# Patient Record
Sex: MALE | Race: WHITE | Employment: OTHER | URBAN - METROPOLITAN AREA
[De-identification: names, ages, dates, MRNs, and addresses within clinical notes are randomized per-mention and may not be internally consistent; named-entity substitution may affect disease eponyms.]

---

## 2017-03-15 ENCOUNTER — HOSPITAL ENCOUNTER (OUTPATIENT)
Dept: GENERAL RADIOLOGY | Age: 67
Discharge: HOME | End: 2017-03-15
Attending: INTERNAL MEDICINE
Payer: COMMERCIAL

## 2017-03-15 DIAGNOSIS — J44.9 COPD (CHRONIC OBSTRUCTIVE PULMONARY DISEASE) (HCC): ICD-10-CM

## 2017-03-15 PROCEDURE — 71020 XR CHEST PA LAT: CPT

## 2017-10-17 ENCOUNTER — OFFICE VISIT (OUTPATIENT)
Dept: CARDIOLOGY CLINIC | Age: 67
End: 2017-10-17

## 2017-10-17 VITALS
BODY MASS INDEX: 27.02 KG/M2 | HEART RATE: 72 BPM | WEIGHT: 193 LBS | SYSTOLIC BLOOD PRESSURE: 130 MMHG | DIASTOLIC BLOOD PRESSURE: 72 MMHG | HEIGHT: 71 IN

## 2017-10-17 DIAGNOSIS — Z87.891 AGGRESSIVE EX-SMOKER: ICD-10-CM

## 2017-10-17 DIAGNOSIS — R06.09 DYSPNEA ON EXERTION: Primary | ICD-10-CM

## 2017-10-17 DIAGNOSIS — E78.5 HYPERLIPIDEMIA, UNSPECIFIED HYPERLIPIDEMIA TYPE: ICD-10-CM

## 2017-10-17 DIAGNOSIS — R61 DIAPHORESIS: ICD-10-CM

## 2017-10-17 DIAGNOSIS — J44.9 CHRONIC OBSTRUCTIVE PULMONARY DISEASE, UNSPECIFIED COPD TYPE (HCC): ICD-10-CM

## 2017-10-17 DIAGNOSIS — K50.919 CROHN'S DISEASE WITH COMPLICATION, UNSPECIFIED GASTROINTESTINAL TRACT LOCATION (HCC): ICD-10-CM

## 2017-10-17 NOTE — PROGRESS NOTES
Cardiology Office Consultation Note     Subjective:      Shanique Najera is a 75 y.o. male patient who is seen for evaluation of excessive diaphoresis with minimal exertion and HERNÁNDEZ. Referred by Dr Bryant Matthew. The excessive perspiration started this summer. He says with just standing outside and doing minimal activity he will is \"soaked\". He has chronic HERNÁNDEZ d/t COPD, but this year he has noticed it has progressive and becoming worse. No associated chest pain. He sees Dr Maurizio Jacobs and had non contrast chest CT in March 2017, I do not have result  In 2010 he had chest CT with lung nodules and left adrenal adenoma  He smoked cigarettes 1 ppdx 40 years, he quit 10 years ago. PMH includes COPD, Crohn's disease, hyperlipidemia. He has chronic swelling in his LE extremities, resolves with elevating the feet overnight. No MI or CVA, hypertension, DM. He does not exercise. No syncope. 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 . Grown children   Family hx: No family hx of early CAD. Father unstable angina- no reported CAD. No hx of ppm/ICD. No SCD. Patient Active Problem List   Diagnosis Code    Hyperlipidemia E78.5    Crohn disease ([facility] [address] K50.90    Encounter for long-term (current) drug use Z79.899    COPD (chronic obstructive pulmonary disease) ([facility] J44.9     Current Outpatient Prescriptions   Medication Sig Dispense Refill    simvastatin (ZOCOR) 40 mg tablet TAKE 1 TABLET DAILY 90 Tab 3    DELZICOL 400 mg cpDR DR capsule TAKE 2 CAPSULES THREE TIMES A  Cap 3    SPIRIVA WITH HANDIHALER 18 mcg inhalation capsule INHALE THE CONTENTS OF 1 CAPSULE ONCE DAILY AS DIRECTED IN THE PACKAGE 90 Cap 3    buPROPion SR (WELLBUTRIN SR) 150 mg SR tablet TAKE 1 TABLET TWICE A  Tab 1    FLUoxetine (PROZAC) 20 mg capsule TAKE 1 CAPSULE BY MOUTH DAILY (Patient taking differently: 40 mg daily.  TAKE 1 CAPSULE BY MOUTH DAILY) 90 Cap 3    cyclobenzaprine (FLEXERIL) 10 mg tablet TAKE 1 TABLET 3 TIMES A DAY AS NEEDED MUSCLE SPASM 20 Tab 0     Allergies   Allergen Reactions    Sulfa (Sulfonamide Antibiotics) Unknown (comments)     Past Medical History:   Diagnosis Date    COPD [facility]chronic obstructive pulmonary disease) ([facility] [address]     Crohn's disease ([facility] [address]     Depression     Elevated lipids     Hyperlipidemia 8/30/2011     Past Surgical History:   Procedure Laterality Date    ENDOSCOPY, COLON, DIAGNOSTIC  2006    Rep 5 yrs    ENDOSCOPY, COLON, DIAGNOSTIC  2/2012    OK. Rep 5 yrs.  HX SMALL BOWEL RESECTION  about 1990    For Crohn's     Family History   Problem Relation Age of Onset    Kidney Disease Mother     Cancer Mother     Parkinsonism Father     Dementia Father     Heart Disease Father      Social History   Substance Use Topics    Smoking status: Former Smoker     Quit date: 1/1/2008    Smokeless tobacco: Not on file    Alcohol use Yes      Comment: social alcohol        Review of Systems:   Constitutional: Negative for fever, chills, weight loss, +malaise/fatigue. HEENT: Negative for nosebleeds, vision changes. Respiratory: Negative for cough, hemoptysis, sputum production, and wheezing. +HERNÁNDEZ  Cardiovascular: Negative for chest pain, palpitations, orthopnea, claudication, +leg swelling, no syncope, and PND. +HERNÁNDEZ  Gastrointestinal: Negative for nausea, vomiting, diarrhea, constipation, blood in stool and melena. Genitourinary: Negative for dysuria, and hematuria. Musculoskeletal: Negative for myalgias, arthralgia. Skin: Negative for rash. Heme: Does not bleed or bruise easily. Neurological: Negative for speech change and focal weakness     Objective:     Visit Vitals    /72 (BP 1 Location: Right arm, BP Patient Position: Sitting)    Pulse 72    Ht 5' 11\" (1.803 m)    Wt 193 lb (87.5 kg)    BMI 26.92 kg/m2      Physical Exam:   Constitutional: well-developed and well-nourished. No distress.    Head: Normocephalic and atraumatic. Eyes: Pupils are equal, round  Neck: supple. No JVD present. Cardiovascular: Normal rate, regular rhythm. Exam reveals no gallop and no friction rub. No murmur heard. Pulmonary/Chest: Effort normal and breath sounds normal. No wheezes. Abdominal: Soft, no tenderness. Musculoskeletal: no edema. compression socks on  Neurological: alert,oriented. Skin: Skin is warm and dry  Psychiatric: normal mood and affect. Behavior is normal. Judgment and thought content normal.      EKG: normal sinus rhythm with non specific T wave abnormalities       Assessment/Plan:       ICD-10-CM ICD-9-CM    1. Dyspnea on exertion R06.09 786.09 AMB POC EKG ROUTINE W/ 12 LEADS, INTER & REP   2. Diaphoresis R61 780.8    3. Aggressive ex-smoker Z87.891 V15.82    4. Chronic obstructive pulmonary disease, unspecified COPD type ([facility] [address] J44.9 496    5. Hyperlipidemia, unspecified hyperlipidemia type E78.5 272.4    6. Crohn's disease with complication, unspecified gastrointestinal tract location [facility]) K50.919 555.9      Recommend stress echo to evaluate LVEF and r/o ischemia. He does not want dye or much radiation  ECG shows normal sinus rhythm with non specific T wave abnormalities. Could have Lcx stenosis    CAD risk factors include ex-smoker, sedentary lifestyle and hyperlipidemia. Creatinine 1.45  He and I talked to about chest CTA or cath as next step if stress echo cannot identify or rule in CAD    Follow-up Disposition:  Return in about 4 weeks (around 11/14/2017). Thank you for involving me in this patient's care and please call with further concerns or questions. Larissa Scott M.D.   Electrophysiology/Cardiology  [facility] [place]  [address] , [address]  [address]  [phone]

## 2017-10-17 NOTE — PROGRESS NOTES
Cardiac Electrophysiology Consultation Note     Subjective:      Jhoana Adhikari is a 75 y.o. male patient who is seen for evaluation of excessive diaphoresis with minimal exertion and HERNÁNDEZ. Referred by Dr Ayo Siu. The excessive perspiration started this summer. He says with just standing outside and doing minimal activity he will is \"soaked\". No associated SOB, chest pain, lightheadedness or dizziness. He has chronic HERNÁNDEZ d/t COPD, but this year he has noticed it has progressive and becoming worse. No associated chest pain. He smoked cigarettes 1 ppdx 40 years, he quit 10 years ago. PMH includes COPD, Crohn's disease, hyperlipidemia. He has chronic swelling in his LE extremities, resolves with elevating the feet overnight. No MI or CVA, hypertension, DM. He does not exercise. No syncope. 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 . Grown children   Family hx: No family hx of early CAD. Father unstable angina- no reported CAD. No hx of ppm/ICD. No SCD. Patient Active Problem List   Diagnosis Code    Hyperlipidemia E78.5    Crohn disease ([facility] [address] K50.90    Encounter for long-term (current) drug use Z79.899    COPD (chronic obstructive pulmonary disease) ([facility] J44.9     Current Outpatient Prescriptions   Medication Sig Dispense Refill    simvastatin (ZOCOR) 40 mg tablet TAKE 1 TABLET DAILY 90 Tab 3    DELZICOL 400 mg cpDR DR capsule TAKE 2 CAPSULES THREE TIMES A  Cap 3    SPIRIVA WITH HANDIHALER 18 mcg inhalation capsule INHALE THE CONTENTS OF 1 CAPSULE ONCE DAILY AS DIRECTED IN THE PACKAGE 90 Cap 3    buPROPion SR (WELLBUTRIN SR) 150 mg SR tablet TAKE 1 TABLET TWICE A  Tab 1    FLUoxetine (PROZAC) 20 mg capsule TAKE 1 CAPSULE BY MOUTH DAILY (Patient taking differently: 40 mg daily.  TAKE 1 CAPSULE BY MOUTH DAILY) 90 Cap 3    cyclobenzaprine (FLEXERIL) 10 mg tablet TAKE 1 TABLET 3 TIMES A DAY AS NEEDED MUSCLE SPASM 20 Tab 0    cyanocobalamin, vitamin B-12, 1,000 mcg/mL kit 1,000 mcg by Injection route every thirty (30) days. Indications: VITAMIN B12 DEFICIENCY 3 mL 3     Allergies   Allergen Reactions    Sulfa (Sulfonamide Antibiotics) Unknown (comments)     Past Medical History:   Diagnosis Date    COPD (chronic obstructive pulmonary disease) ([facility] [address]     Crohn's disease ([facility] [address]     Depression     Elevated lipids     Hyperlipidemia 8/30/2011     Past Surgical History:   Procedure Laterality Date    ENDOSCOPY, COLON, DIAGNOSTIC  2006    Rep 5 yrs    ENDOSCOPY, COLON, DIAGNOSTIC  2/2012    OK. Rep 5 yrs.  HX SMALL BOWEL RESECTION  about 1990    For Crohn's     Family History   Problem Relation Age of Onset    Kidney Disease Mother     Cancer Mother     Parkinsonism Father     Dementia Father     Heart Disease Father      Social History   Substance Use Topics    Smoking status: Former Smoker     Quit date: 1/1/2008    Smokeless tobacco: Not on file    Alcohol use Yes      Comment: social alcohol        Review of Systems:   Constitutional: Negative for fever, chills, weight loss, +malaise/fatigue. HEENT: Negative for nosebleeds, vision changes. Respiratory: Negative for cough, hemoptysis, sputum production, and wheezing. +HERNÁNDEZ  Cardiovascular: Negative for chest pain, palpitations, orthopnea, claudication, +leg swelling, no syncope, and PND. Gastrointestinal: Negative for nausea, vomiting, diarrhea, constipation, blood in stool and melena. Genitourinary: Negative for dysuria, and hematuria. Musculoskeletal: Negative for myalgias, arthralgia. Skin: Negative for rash. Heme: Does not bleed or bruise easily.    Neurological: Negative for speech change and focal weakness     Objective:     Visit Vitals    /72 (BP 1 Location: Right arm, BP Patient Position: Sitting)    Pulse 72    Ht 5' 11\" (1.803 m)    Wt 193 lb (87.5 kg)    BMI 26.92 kg/m2      Physical Exam:   Constitutional: well-developed and well-nourished. No distress. Head: Normocephalic and atraumatic. Eyes: Pupils are equal, round  Neck: supple. No JVD present. Cardiovascular: Normal rate, regular rhythm. Exam reveals no gallop and no friction rub. No murmur heard. Pulmonary/Chest: Effort normal and breath sounds normal. No wheezes. Abdominal: Soft, no tenderness. Musculoskeletal: no edema. compression socks on  Neurological: alert,oriented. Skin: Skin is warm and dry  Psychiatric: normal mood and affect. Behavior is normal. Judgment and thought content normal.      EKG: normal sinus rhythm with non specific T wave abnormalities       Assessment/Plan:       ICD-10-CM ICD-9-CM    1. Dyspnea on exertion R06.09 786.09 AMB POC EKG ROUTINE W/ 12 LEADS, INTER & REP   2. Diaphoresis R61 780.8    3. Aggressive ex-smoker Z87.891 V15.82    4. Chronic obstructive pulmonary disease, unspecified COPD type ([facility] [address] J44.9 496    5. Hyperlipidemia, unspecified hyperlipidemia type E78.5 272.4    6. Crohn's disease with complication, unspecified gastrointestinal tract location [facility]) K50.919 555.9      Recommend stress echo to evaluate LVEF and r/o ischemia. ECG shows normal sinus rhythm with non specific T wave abnormalities. No changes compared to ECG 2014. CAD risk factors include ex-smoker, sedentary lifestyle and hyperlipidemia. Thank you for involving me in this patient's care and please call with further concerns or questions. Jorge More M.D.   Electrophysiology/Cardiology  [facility] [place]  [address] [facility], [address]  [address]  [phone]

## 2017-10-17 NOTE — PATIENT INSTRUCTIONS
You will be scheduled for a stress echo after your appointment today. Please wear comfortable clothing (shorts or pants with a shirt or blouse) and walking/athletic shoes. Do not eat or drink anything, except water, for at least 2 hours prior to your test.    Do take your scheduled medications prior to your test.    You will need to follow up in clinic with Dr. Fabian Done in 1 months.

## 2017-10-17 NOTE — MR AVS SNAPSHOT
Visit Information Date & Time Provider Department Dept. Phone Encounter #  
 10/17/2017  9:00 AM Raysa Davis MD [facility] Ericka Vasquez [phone] [id number] Follow-up Instructions Return in about 4 weeks (around 11/14/2017). Upcoming Health Maintenance Date Due Hepatitis C Screening 1950 FOBT Q 1 YEAR AGE 50-75 9/25/1999 DTaP/Tdap/Td series (1 - Tdap) 8/24/2010 GLAUCOMA SCREENING Q2Y 9/25/2014 Pneumococcal 65+ Low/Medium Risk (1 of 2 - PCV13) 9/25/2014 MEDICARE YEARLY EXAM 9/25/2014 INFLUENZA AGE 9 TO ADULT 8/1/2017 Allergies as of 10/17/2017  Review Complete On: 10/17/2017 By: Raysa Davis MD  
  
 Severity Noted Reaction Type Reactions Sulfa (Sulfonamide Antibiotics)  08/26/2011    Unknown (comments) Current Immunizations  Never Reviewed Name Date  
 TD Vaccine 8/23/2010 Zoster 1/1/2012 Not reviewed this visit You Were Diagnosed With   
  
 Codes Comments Dyspnea on exertion    -  Primary ICD-10-CM: R06.09 
ICD-9-CM: 786.09 Diaphoresis     ICD-10-CM: R61 
ICD-9-CM: 780.8 Aggressive ex-smoker     ICD-10-CM: Z87.891 ICD-9-CM: V15.82 Chronic obstructive pulmonary disease, unspecified COPD type ([facility]     ICD-10-CM: J44.9 ICD-9-CM: 843 Hyperlipidemia, unspecified hyperlipidemia type     ICD-10-CM: E78.5 ICD-9-CM: 272.4 Crohn's disease with complication, unspecified gastrointestinal tract location [facility])     ICD-10-CM: F83.079 ICD-9-CM: 459. 9 Vitals BP Pulse Height(growth percentile) Weight(growth percentile) BMI Smoking Status 130/72 (BP 1 Location: Right arm, BP Patient Position: Sitting) 72 5' 11\" (1.803 m) 193 lb (87.5 kg) 26.92 kg/m2 Former Smoker Vitals History BMI and BSA Data Body Mass Index Body Surface Area  
 26.92 kg/m 2 2.09 m 2 Preferred Pharmacy Pharmacy Name Phone  [address] [facility] [phone] Your Updated Medication List  
  
   
This list is accurate as of: 10/17/17  9:58 AM.  Always use your most recent med list.  
  
  
  
  
 buPROPion  mg SR tablet Commonly known as:  WELLBUTRIN SR  
TAKE 1 TABLET TWICE A DAY  
  
 cyclobenzaprine 10 mg tablet Commonly known as:  FLEXERIL  
TAKE 1 TABLET 3 TIMES A DAY AS NEEDED MUSCLE SPASM DELZICOL 400 mg Cpdr DR capsule Generic drug:  mesalamine DR  
TAKE 2 CAPSULES THREE TIMES A DAY FLUoxetine 20 mg capsule Commonly known as:  PROzac TAKE 1 CAPSULE BY MOUTH DAILY  
  
 simvastatin 40 mg tablet Commonly known as:  ZOCOR  
TAKE 1 TABLET DAILY SPIRIVA WITH HANDIHALER 18 mcg inhalation capsule Generic drug:  tiotropium INHALE THE CONTENTS OF 1 CAPSULE ONCE DAILY AS DIRECTED IN THE PACKAGE We Performed the Following AMB POC EKG ROUTINE W/ 12 LEADS, INTER & REP [50415 CPT(R)] Follow-up Instructions Return in about 4 weeks (around 11/14/2017). To-Do List   
 10/17/2017 ECHO:  ECHO TTE STRESS EXRCSE COMP W OR WO CONTR Patient Instructions You will be scheduled for a stress echo after your appointment today. Please wear comfortable clothing (shorts or pants with a shirt or blouse) and walking/athletic shoes. Do not eat or drink anything, except water, for at least 2 hours prior to your test. 
 
Do take your scheduled medications prior to your test. 
 
You will need to follow up in clinic with Dr. Chad Morris in 1 months. Introducing [facility]! Dear Josefina Christopher: Thank you for requesting a [employer] account. Our records indicate that you have previously registered for a [employer] account but its currently inactive. Please call our [employer] support line at [phone]. Additional Information If you have questions, please visit the Frequently Asked Questions section of the [employer] website at [email or web address] Remember, [email or web address] is NOT to be used for urgent needs. For medical emergencies, dial 911. Now available from your iPhone and Android! Please provide this summary of care documentation to your next provider. Your primary care clinician is listed as Curt Ludwig. If you have any questions after today's visit, please call [phone].

## 2017-10-30 ENCOUNTER — CLINICAL SUPPORT (OUTPATIENT)
Dept: CARDIOLOGY CLINIC | Age: 67
End: 2017-10-30

## 2017-10-30 DIAGNOSIS — R06.02 SHORTNESS OF BREATH: Primary | ICD-10-CM

## 2017-10-31 ENCOUNTER — TELEPHONE (OUTPATIENT)
Dept: CARDIOLOGY CLINIC | Age: 67
End: 2017-10-31

## 2017-10-31 NOTE — PROGRESS NOTES
Stress echo is normal  If still short of breaths, consider rediscussion about chest CT, cath  11/21/2017 4:00 PM    Betty Salazar MD            [place] [address]

## 2017-10-31 NOTE — TELEPHONE ENCOUNTER
Verified patient with two types of identifiers. Notified patient of results and reminded patient of upcoming appointment. Patient verbalized understanding and will call with any other questions.

## 2017-10-31 NOTE — TELEPHONE ENCOUNTER
----- Message from Ihsan Cali MD sent at 10/31/2017  8:37 AM EDT -----  Stress echo is normal  If still short of breaths, consider rediscussion about chest CT, cath  11/21/2017 4:00 PM    Ihsan Cali MD            [place] [address]

## 2017-11-21 ENCOUNTER — OFFICE VISIT (OUTPATIENT)
Dept: CARDIOLOGY CLINIC | Age: 67
End: 2017-11-21

## 2017-11-21 VITALS
BODY MASS INDEX: 26.6 KG/M2 | DIASTOLIC BLOOD PRESSURE: 70 MMHG | HEART RATE: 79 BPM | HEIGHT: 71 IN | WEIGHT: 190 LBS | SYSTOLIC BLOOD PRESSURE: 130 MMHG | RESPIRATION RATE: 16 BRPM | OXYGEN SATURATION: 98 %

## 2017-11-21 DIAGNOSIS — R91.1 LUNG NODULE: ICD-10-CM

## 2017-11-21 DIAGNOSIS — J44.9 CHRONIC OBSTRUCTIVE PULMONARY DISEASE, UNSPECIFIED COPD TYPE (HCC): ICD-10-CM

## 2017-11-21 DIAGNOSIS — R06.09 DYSPNEA ON EXERTION: Primary | ICD-10-CM

## 2017-11-21 DIAGNOSIS — K50.919 CROHN'S DISEASE WITH COMPLICATION, UNSPECIFIED GASTROINTESTINAL TRACT LOCATION (HCC): ICD-10-CM

## 2017-11-21 RX ORDER — UREA 10 %
800 LOTION (ML) TOPICAL DAILY
COMMUNITY

## 2017-11-21 NOTE — MR AVS SNAPSHOT
Visit Information Date & Time Provider Department Dept. Phone Encounter #  
 11/21/2017  4:00 PM Leonel Hopper MD [facility] Angela Baird [phone] [id number] Upcoming Health Maintenance Date Due Hepatitis C Screening 1950 FOBT Q 1 YEAR AGE 50-75 9/25/1999 DTaP/Tdap/Td series (1 - Tdap) 8/24/2010 GLAUCOMA SCREENING Q2Y 9/25/2014 Pneumococcal 65+ Low/Medium Risk (1 of 2 - PCV13) 9/25/2014 MEDICARE YEARLY EXAM 9/25/2014 Influenza Age 5 to Adult 8/1/2017 Allergies as of 11/21/2017  Review Complete On: 11/21/2017 By: Leonel Hopper MD  
  
 Severity Noted Reaction Type Reactions Sulfa (Sulfonamide Antibiotics)  08/26/2011    Unknown (comments) Current Immunizations  Never Reviewed Name Date  
 TD Vaccine 8/23/2010 Zoster 1/1/2012 Not reviewed this visit Vitals BP Pulse Resp Height(growth percentile) Weight(growth percentile) SpO2  
 130/70 (BP 1 Location: Left arm, BP Patient Position: Sitting) 79 16 5' 11\" (1.803 m) 190 lb (86.2 kg) 98% BMI Smoking Status 26.5 kg/m2 Former Smoker BMI and BSA Data Body Mass Index Body Surface Area  
 26.5 kg/m 2 2.08 m 2 Preferred Pharmacy Pharmacy Name Phone [address] [facility] [phone] Your Updated Medication List  
  
   
This list is accurate as of: 11/21/17  4:29 PM.  Always use your most recent med list.  
  
  
  
  
 buPROPion  mg SR tablet Commonly known as:  WELLBUTRIN SR  
TAKE 1 TABLET TWICE A DAY  
  
 cyclobenzaprine 10 mg tablet Commonly known as:  FLEXERIL  
TAKE 1 TABLET 3 TIMES A DAY AS NEEDED MUSCLE SPASM DELZICOL 400 mg Cpdr DR capsule Generic drug:  mesalamine DR  
TAKE 2 CAPSULES THREE TIMES A DAY FLUoxetine 20 mg capsule Commonly known as:  PROzac TAKE 1 CAPSULE BY MOUTH DAILY  
  
 folic acid 505 mcg tablet Take 800 mcg by mouth daily. simvastatin 40 mg tablet Commonly known as:  ZOCOR  
TAKE 1 TABLET DAILY SPIRIVA WITH HANDIHALER 18 mcg inhalation capsule Generic drug:  tiotropium INHALE THE CONTENTS OF 1 CAPSULE ONCE DAILY AS DIRECTED IN THE PACKAGE Patient Instructions You will need to follow up in clinic with Dr. Luiz Mancia as needed Introducing [facility]! Dear Quiana Gilliam: Thank you for requesting a [email or web address] account. Our records indicate that you have previously registered for a [email or web address] account but its currently inactive. Please call our [email or web address] support line at [phone]. Additional Information If you have questions, please visit the Frequently Asked Questions section of the [email or web address] website at [email or web address] Remember, [email or web address] is NOT to be used for urgent needs. For medical emergencies, dial 911. Now available from your iPhone and Android! Please provide this summary of care documentation to your next provider. Your primary care clinician is listed as Megan Deal. If you have any questions after today's visit, please call [phone].

## 2018-04-05 ENCOUNTER — HOSPITAL ENCOUNTER (OUTPATIENT)
Dept: ULTRASOUND IMAGING | Age: 68
Discharge: HOME | End: 2018-04-05
Attending: FAMILY MEDICINE
Payer: COMMERCIAL

## 2018-04-05 DIAGNOSIS — Z13.6 SCREENING FOR CARDIOVASCULAR CONDITION: ICD-10-CM

## 2018-04-05 PROCEDURE — 76706 US ABDL AORTA SCREEN AAA: CPT

## 2019-08-06 ENCOUNTER — ANESTHESIA EVENT (OUTPATIENT)
Dept: ENDOSCOPY | Age: 69
End: 2019-08-06
Payer: MEDICARE

## 2019-08-06 RX ORDER — FLUOXETINE HYDROCHLORIDE 20 MG/1
20 CAPSULE ORAL DAILY
COMMUNITY

## 2019-08-06 RX ORDER — MESALAMINE 800 MG/1
800 TABLET, DELAYED RELEASE ORAL 3 TIMES DAILY
COMMUNITY

## 2019-08-06 NOTE — ANESTHESIA PREPROCEDURE EVALUATION
Relevant Problems   No relevant active problems       Anesthetic History   No history of anesthetic complications            Review of Systems / Medical History  Patient summary reviewed, nursing notes reviewed and pertinent labs reviewed    Pulmonary    COPD               Neuro/Psych         Psychiatric history     Cardiovascular  Within defined limits                     GI/Hepatic/Renal  Within defined limits              Endo/Other  Within defined limits           Other Findings              Physical Exam    Airway  Mallampati: II  TM Distance: > 6 cm  Neck ROM: normal range of motion   Mouth opening: Normal     Cardiovascular  Regular rate and rhythm,  S1 and S2 normal,  no murmur, click, rub, or gallop             Dental  No notable dental hx       Pulmonary  Breath sounds clear to auscultation               Abdominal  GI exam deferred       Other Findings            Anesthetic Plan    ASA: 2  Anesthesia type: MAC            Anesthetic plan and risks discussed with: Patient

## 2019-08-07 ENCOUNTER — ANESTHESIA (OUTPATIENT)
Dept: ENDOSCOPY | Age: 69
End: 2019-08-07
Payer: MEDICARE

## 2019-08-07 ENCOUNTER — HOSPITAL ENCOUNTER (OUTPATIENT)
Age: 69
Setting detail: OUTPATIENT SURGERY
Discharge: HOME | End: 2019-08-07
Attending: INTERNAL MEDICINE | Admitting: INTERNAL MEDICINE
Payer: MEDICARE

## 2019-08-07 VITALS
OXYGEN SATURATION: 97 % | TEMPERATURE: 96.8 F | HEART RATE: 62 BPM | WEIGHT: 186 LBS | RESPIRATION RATE: 9 BRPM | HEIGHT: 72 IN | DIASTOLIC BLOOD PRESSURE: 45 MMHG | SYSTOLIC BLOOD PRESSURE: 102 MMHG | BODY MASS INDEX: 25.19 KG/M2

## 2019-08-07 PROCEDURE — 76040000019: Performed by: INTERNAL MEDICINE

## 2019-08-07 PROCEDURE — 77030021593 HC FCPS BIOP ENDOSC BSC -A: Performed by: INTERNAL MEDICINE

## 2019-08-07 PROCEDURE — 88305 TISSUE EXAM BY PATHOLOGIST: CPT

## 2019-08-07 PROCEDURE — 76060000031 HC ANESTHESIA FIRST 0.5 HR: Performed by: INTERNAL MEDICINE

## 2019-08-07 PROCEDURE — 74011250636 HC RX REV CODE- 250/636: Performed by: NURSE ANESTHETIST, CERTIFIED REGISTERED

## 2019-08-07 RX ORDER — SODIUM CHLORIDE 0.9 % (FLUSH) 0.9 %
5-40 SYRINGE (ML) INJECTION AS NEEDED
Status: DISCONTINUED | OUTPATIENT
Start: 2019-08-07 | End: 2019-08-07 | Stop reason: HOSPADM

## 2019-08-07 RX ORDER — FLUMAZENIL 0.1 MG/ML
0.2 INJECTION INTRAVENOUS
Status: DISCONTINUED | OUTPATIENT
Start: 2019-08-07 | End: 2019-08-07 | Stop reason: HOSPADM

## 2019-08-07 RX ORDER — SODIUM CHLORIDE 9 MG/ML
50 INJECTION, SOLUTION INTRAVENOUS CONTINUOUS
Status: DISCONTINUED | OUTPATIENT
Start: 2019-08-07 | End: 2019-08-07 | Stop reason: HOSPADM

## 2019-08-07 RX ORDER — SODIUM CHLORIDE 0.9 % (FLUSH) 0.9 %
5-40 SYRINGE (ML) INJECTION EVERY 8 HOURS
Status: DISCONTINUED | OUTPATIENT
Start: 2019-08-07 | End: 2019-08-07 | Stop reason: HOSPADM

## 2019-08-07 RX ORDER — NALOXONE HYDROCHLORIDE 0.4 MG/ML
0.4 INJECTION, SOLUTION INTRAMUSCULAR; INTRAVENOUS; SUBCUTANEOUS
Status: DISCONTINUED | OUTPATIENT
Start: 2019-08-07 | End: 2019-08-07 | Stop reason: HOSPADM

## 2019-08-07 RX ORDER — SODIUM CHLORIDE 9 MG/ML
INJECTION, SOLUTION INTRAVENOUS
Status: DISCONTINUED | OUTPATIENT
Start: 2019-08-07 | End: 2019-08-07 | Stop reason: HOSPADM

## 2019-08-07 RX ORDER — ATROPINE SULFATE 0.1 MG/ML
0.5 INJECTION INTRAVENOUS
Status: DISCONTINUED | OUTPATIENT
Start: 2019-08-07 | End: 2019-08-07 | Stop reason: HOSPADM

## 2019-08-07 RX ORDER — LIDOCAINE HYDROCHLORIDE 20 MG/ML
INJECTION, SOLUTION EPIDURAL; INFILTRATION; INTRACAUDAL; PERINEURAL AS NEEDED
Status: DISCONTINUED | OUTPATIENT
Start: 2019-08-07 | End: 2019-08-07 | Stop reason: HOSPADM

## 2019-08-07 RX ORDER — PROPOFOL 10 MG/ML
INJECTION, EMULSION INTRAVENOUS AS NEEDED
Status: DISCONTINUED | OUTPATIENT
Start: 2019-08-07 | End: 2019-08-07 | Stop reason: HOSPADM

## 2019-08-07 RX ORDER — DEXTROMETHORPHAN/PSEUDOEPHED 2.5-7.5/.8
1.2 DROPS ORAL
Status: DISCONTINUED | OUTPATIENT
Start: 2019-08-07 | End: 2019-08-07 | Stop reason: HOSPADM

## 2019-08-07 RX ORDER — EPINEPHRINE 0.1 MG/ML
1 INJECTION INTRACARDIAC; INTRAVENOUS
Status: DISCONTINUED | OUTPATIENT
Start: 2019-08-07 | End: 2019-08-07 | Stop reason: HOSPADM

## 2019-08-07 RX ADMIN — PROPOFOL 50 MG: 10 INJECTION, EMULSION INTRAVENOUS at 07:51

## 2019-08-07 RX ADMIN — PROPOFOL 100 MG: 10 INJECTION, EMULSION INTRAVENOUS at 07:44

## 2019-08-07 RX ADMIN — PROPOFOL 50 MG: 10 INJECTION, EMULSION INTRAVENOUS at 07:55

## 2019-08-07 RX ADMIN — PROPOFOL 50 MG: 10 INJECTION, EMULSION INTRAVENOUS at 07:52

## 2019-08-07 RX ADMIN — PROPOFOL 50 MG: 10 INJECTION, EMULSION INTRAVENOUS at 07:49

## 2019-08-07 RX ADMIN — LIDOCAINE HYDROCHLORIDE 40 MG: 20 INJECTION, SOLUTION EPIDURAL; INFILTRATION; INTRACAUDAL; PERINEURAL at 07:43

## 2019-08-07 RX ADMIN — PROPOFOL 50 MG: 10 INJECTION, EMULSION INTRAVENOUS at 07:46

## 2019-08-07 RX ADMIN — PROPOFOL 50 MG: 10 INJECTION, EMULSION INTRAVENOUS at 07:57

## 2019-08-07 RX ADMIN — PROPOFOL 100 MG: 10 INJECTION, EMULSION INTRAVENOUS at 07:37

## 2019-08-07 RX ADMIN — SODIUM CHLORIDE: 900 INJECTION, SOLUTION INTRAVENOUS at 07:38

## 2019-08-07 RX ADMIN — PROPOFOL 50 MG: 10 INJECTION, EMULSION INTRAVENOUS at 07:40

## 2019-08-07 RX ADMIN — PROPOFOL 50 MG: 10 INJECTION, EMULSION INTRAVENOUS at 07:42

## 2019-08-07 RX ADMIN — PROPOFOL 50 MG: 10 INJECTION, EMULSION INTRAVENOUS at 07:58

## 2019-08-07 RX ADMIN — PROPOFOL 50 MG: 10 INJECTION, EMULSION INTRAVENOUS at 08:01

## 2019-08-07 NOTE — PROGRESS NOTES

## 2019-08-07 NOTE — DISCHARGE INSTRUCTIONS
[address]  [address] [facility], [address] [place] [address] [address]          Yuniel Schaefer  [id number]  1950    COLON DISCHARGE INSTRUCTIONS    DISCOMFORT:  Redness at IV site- apply warm compress to area; if redness or soreness persist- contact your physician  There may be a slight amount of blood passed from the rectum  Gaseous discomfort- walking, belching will help relieve any discomfort  You may not operate a vehicle for 12 hours  You may not engage in an occupation involving machinery or appliances for rest of today  You may not drink alcoholic beverages for at least 12 hours  Avoid making any critical decisions for at least 24 hour  DIET:   Regular diet. - however -  remember your colon is empty and a heavy meal will produce gas. Avoid these foods:  vegetables, fried / greasy foods, carbonated drinks for today         ACTIVITY:  You may resume your normal daily activities it is recommended that you spend the remainder of the day resting -  avoid any strenuous activity. CALL M.D. ANY SIGN OF:   Increasing pain, nausea, vomiting  Abdominal distension (swelling)  New increased bleeding (oral or rectal)  Fever (chills)  Pain in chest area  Bloody discharge from nose or mouth  Shortness of breath     Follow-up Instructions:   Call Dr. Lois Jaimes for any questions or problems.    Telephone # [phone]  Biopsy results will be available in  5 to 7 days  Should have a repeat colonoscopy in 5 years    Impression:  1. - Ulceration Near the Appendix-Suspect Chrohn's

## 2019-08-07 NOTE — PROGRESS NOTES
Gabriele Gregory  1950  [id number]    Situation:  Verbal report received from: Leo Talbot  Procedure: Procedure(s):  COLONOSCOPY  COLON BIOPSY    Background:    Preoperative diagnosis: SCREENING  Postoperative diagnosis: 1. - Ulceration Near the Appendix-Suspect Bienvenido's    :  Dr. Elton Nunez  Assistant(s): Endoscopy Technician-1: Jane Jha  Endoscopy RN-1: Rodriguez Randolph RN    Specimens:   ID Type Source Tests Collected by Time Destination   1 : Periappendiceal Biopsy Preservative   Ezra Williamson MD 8/7/2019 0802 Pathology     H. Pylori  no    Assessment:  Intra-procedure medications   Anesthesia gave intra-procedure sedation and medications, see anesthesia flow sheet yes    Intravenous fluids: NS@ KVO     Vital signs stable     Abdominal assessment: round and soft    Recommendation:  Discharge patient per MD order    Family or Friend   Permission to share finding with family or friend yes

## 2019-08-07 NOTE — PROCEDURES
[address]  174 [facility], [address] [place] [address]              :  Jennifer Villar MD    Surgical Assistant: None    Implants: None    Referring Provider: Kandice Roque MD    Sedation:  MAC anesthesia Propofol        Prior to the procedure its objectives, risks, consequences and alternatives were discussed with the patient who then elected to proceed. The patient had the opportunity to ask questions and those questions were answered. A physical exam was performed. The heart, lungs, and mental status were examined prior to the procedure and found to be satisfactory for conscious sedation and for the procedure. Conscious sedation was initiated by the physician. Continuous pulse oximetry and blood pressure monitoring were used throughout the procedure. After appropriate analgesia and rectal exam, the colonoscope was passed into the anus and passed to the cecum without difficulty. The prep was good. On slow withdrawal of the scope, in the area of the appendix, there was ulceration, multiple biopsies obtained. The remainder of cecum was normal. The ascending colon, hepatic flexure, transverse colon, splenic flexure, descending colon, sigmoid and rectum were normal. Retroflexion exam of the rectum was normal. He tolerated the procedure without complication and I recommend a repeat colonoscopy in 5 years. Specimen periappendiceal area    Complications: None. EBL:  None.     Jennifer Villar MD  8/7/2019  8:07 AM

## 2019-08-07 NOTE — ANESTHESIA POSTPROCEDURE EVALUATION
Post-Anesthesia Evaluation and Assessment    Patient: Nehemiah Fox MRN: [id number]  SSN: [id number]    YOB: 1950  Age: 70 y.o. Sex: male      I have evaluated the patient and they are stable and ready for discharge from the PACU. Cardiovascular Function/Vital Signs  Visit Vitals  BP (!) 71/47   Pulse (!) 58   Temp 36 °C (96.8 °F)   Resp 16   Ht 6' (1.829 m)   Wt 84.4 kg (186 lb)   SpO2 100%   BMI 25.23 kg/m²       Patient is status post MAC anesthesia for Procedure(s):  COLONOSCOPY  COLON BIOPSY. Nausea/Vomiting: None    Postoperative hydration reviewed and adequate. Pain:  Pain Scale 1: Numeric (0 - 10) (08/07/19 0731)  Pain Intensity 1: 0 (08/07/19 0731)   Managed    Neurological Status: At baseline    Mental Status, Level of Consciousness: Alert and  oriented to person, place, and time    Pulmonary Status:   O2 Device: Nasal cannula (08/07/19 0804)   Adequate oxygenation and airway patent    Complications related to anesthesia: None    Post-anesthesia assessment completed. No concerns    Signed By: Salima Rose MD     August 7, 2019              Procedure(s):  COLONOSCOPY  COLON BIOPSY. MAC    <BSHSIANPOST>    Vitals Value Taken Time   BP 76/47 8/7/2019  8:21 AM   Temp     Pulse 56 8/7/2019  8:23 AM   Resp 13 8/7/2019  8:23 AM   SpO2     Vitals shown include unvalidated device data.

## 2019-08-07 NOTE — H&P
[address]  [address] [facility], [address] [place] [address]                 Nehemiah Fox is a  70 y.o.  male who presents with long history of Crohn's for screening. .        Past Medical History:   Diagnosis Date    COPD [facility]chronic obstructive [facility] disease) ([facility] [address]     Crohn's disease ([facility] [address]     Depression     Elevated lipids     Hyperlipidemia 8/30/2011     Past Surgical History:   Procedure Laterality Date    ENDOSCOPY, COLON, DIAGNOSTIC  2006    Rep 5 yrs    ENDOSCOPY, COLON, DIAGNOSTIC  2/2012    OK. Rep 5 yrs.  HX GI      surgery for pyloric stenosis as infant    HX SMALL BOWEL RESECTION  about 1990    For Crohn's    HX TONSILLECTOMY       Allergies   Allergen Reactions    Azulfidine [Sulfasalazine] Other (comments)     Malaise/hives/fever.  Lialda [Mesalamine] Other (comments)     Dosage of 2400 mg causes severe diarrhea.  Sulfa (Sulfonamide Antibiotics) Other (comments)     Malaise/hives/fever         Visit Vitals  /73   Pulse 71   Temp 98 °F (36.7 °C)   Resp 14   Ht 6' (1.829 m)   Wt 84.4 kg (186 lb)   SpO2 97%   BMI 25.23 kg/m²           PHYSICAL EXAM:  General: WD, WN. Alert, cooperative, no acute distress    HEENT: NC, Atraumatic. PERRLA, EOMI. Anicteric sclerae. Mallampati score 2  Lungs:  CTA Bilaterally. No Wheezing/Rhonchi/Rales. Heart:  Regular  rhythm,  No murmur (), No Rubs, No Gallops  Abdomen: Soft, Non distended, Non tender.  +Bowel sounds, no HSM  Extremities: No c/c/e  Neurologic:  CN 2-12 gi, Alert and oriented X 3. No acute neurological distress   Psych:   Good insight. Not anxious nor agitated. Plan:   Endoscopic procedure with MAC.     Bhupinder Bautista MD  8/7/2019  7:42 AM

## 2019-08-16 ENCOUNTER — HOSPITAL ENCOUNTER (INPATIENT)
Age: 69
LOS: 7 days | Discharge: HOME | DRG: 385 | End: 2019-08-23
Attending: EMERGENCY MEDICINE | Admitting: INTERNAL MEDICINE
Payer: MEDICARE

## 2019-08-16 ENCOUNTER — APPOINTMENT (OUTPATIENT)
Dept: CT IMAGING | Age: 69
DRG: 385 | End: 2019-08-16
Attending: PHYSICIAN ASSISTANT
Payer: MEDICARE

## 2019-08-16 DIAGNOSIS — K50.919 CROHN'S DISEASE WITH COMPLICATION, UNSPECIFIED GASTROINTESTINAL TRACT LOCATION (HCC): ICD-10-CM

## 2019-08-16 DIAGNOSIS — K56.609 SBO (SMALL BOWEL OBSTRUCTION) (HCC): Primary | ICD-10-CM

## 2019-08-16 PROBLEM — K50.90 EXACERBATION OF CROHN'S DISEASE (HCC): Status: ACTIVE | Noted: 2019-08-16

## 2019-08-16 LAB
ALBUMIN SERPL-MCNC: 4.2 G/DL (ref 3.5–5)
ALBUMIN/GLOB SERPL: 0.9 {RATIO} (ref 1.1–2.2)
ALP SERPL-CCNC: 130 U/L (ref 45–117)
ALT SERPL-CCNC: 33 U/L (ref 12–78)
ANION GAP SERPL CALC-SCNC: 9 MMOL/L (ref 5–15)
AST SERPL-CCNC: 21 U/L (ref 15–37)
BASOPHILS # BLD: 0.1 K/UL (ref 0–0.1)
BASOPHILS NFR BLD: 0 % (ref 0–1)
BILIRUB SERPL-MCNC: 2 MG/DL (ref 0.2–1)
BUN SERPL-MCNC: 23 MG/DL (ref 6–20)
BUN/CREAT SERPL: 13 (ref 12–20)
CALCIUM SERPL-MCNC: 10.8 MG/DL (ref 8.5–10.1)
CHLORIDE SERPL-SCNC: 99 MMOL/L (ref 97–108)
CO2 SERPL-SCNC: 28 MMOL/L (ref 21–32)
COMMENT, HOLDF: NORMAL
CREAT SERPL-MCNC: 1.77 MG/DL (ref 0.7–1.3)
DIFFERENTIAL METHOD BLD: ABNORMAL
EOSINOPHIL # BLD: 0 K/UL (ref 0–0.4)
EOSINOPHIL NFR BLD: 0 % (ref 0–7)
ERYTHROCYTE [DISTWIDTH] IN BLOOD BY AUTOMATED COUNT: 14.5 % (ref 11.5–14.5)
GLOBULIN SER CALC-MCNC: 4.6 G/DL (ref 2–4)
GLUCOSE SERPL-MCNC: 172 MG/DL (ref 65–100)
HCT VFR BLD AUTO: 56 % (ref 36.6–50.3)
HGB BLD-MCNC: 18.1 G/DL (ref 12.1–17)
IMM GRANULOCYTES # BLD AUTO: 0.1 K/UL (ref 0–0.04)
IMM GRANULOCYTES NFR BLD AUTO: 0 % (ref 0–0.5)
LYMPHOCYTES # BLD: 2.1 K/UL (ref 0.8–3.5)
LYMPHOCYTES NFR BLD: 14 % (ref 12–49)
MCH RBC QN AUTO: 28.7 PG (ref 26–34)
MCHC RBC AUTO-ENTMCNC: 32.3 G/DL (ref 30–36.5)
MCV RBC AUTO: 88.7 FL (ref 80–99)
MONOCYTES # BLD: 0.7 K/UL (ref 0–1)
MONOCYTES NFR BLD: 5 % (ref 5–13)
NEUTS SEG # BLD: 12.3 K/UL (ref 1.8–8)
NEUTS SEG NFR BLD: 81 % (ref 32–75)
NRBC # BLD: 0 K/UL (ref 0–0.01)
NRBC BLD-RTO: 0 PER 100 WBC
PLATELET # BLD AUTO: 316 K/UL (ref 150–400)
PMV BLD AUTO: 9.7 FL (ref 8.9–12.9)
POTASSIUM SERPL-SCNC: 4.7 MMOL/L (ref 3.5–5.1)
PROT SERPL-MCNC: 8.8 G/DL (ref 6.4–8.2)
RBC # BLD AUTO: 6.31 M/UL (ref 4.1–5.7)
SAMPLES BEING HELD,HOLD: NORMAL
SODIUM SERPL-SCNC: 136 MMOL/L (ref 136–145)
TROPONIN I SERPL-MCNC: <0.05 NG/ML
WBC # BLD AUTO: 15.3 K/UL (ref 4.1–11.1)

## 2019-08-16 PROCEDURE — 36415 COLL VENOUS BLD VENIPUNCTURE: CPT

## 2019-08-16 PROCEDURE — 85025 COMPLETE CBC W/AUTO DIFF WBC: CPT

## 2019-08-16 PROCEDURE — 65270000029 HC RM PRIVATE

## 2019-08-16 PROCEDURE — 84484 ASSAY OF TROPONIN QUANT: CPT

## 2019-08-16 PROCEDURE — 74011250636 HC RX REV CODE- 250/636: Performed by: PHYSICIAN ASSISTANT

## 2019-08-16 PROCEDURE — 80053 COMPREHEN METABOLIC PANEL: CPT

## 2019-08-16 PROCEDURE — 74176 CT ABD & PELVIS W/O CONTRAST: CPT

## 2019-08-16 PROCEDURE — 93005 ELECTROCARDIOGRAM TRACING: CPT

## 2019-08-16 PROCEDURE — 99284 EMERGENCY DEPT VISIT MOD MDM: CPT

## 2019-08-16 PROCEDURE — 74011250636 HC RX REV CODE- 250/636: Performed by: INTERNAL MEDICINE

## 2019-08-16 RX ORDER — METRONIDAZOLE 500 MG/100ML
500 INJECTION, SOLUTION INTRAVENOUS EVERY 12 HOURS
Status: DISCONTINUED | OUTPATIENT
Start: 2019-08-17 | End: 2019-08-22 | Stop reason: CLARIF

## 2019-08-16 RX ORDER — SIMVASTATIN 40 MG/1
40 TABLET, FILM COATED ORAL
Status: DISCONTINUED | OUTPATIENT
Start: 2019-08-17 | End: 2019-08-23 | Stop reason: HOSPADM

## 2019-08-16 RX ORDER — LEVOFLOXACIN 5 MG/ML
750 INJECTION, SOLUTION INTRAVENOUS EVERY 24 HOURS
Status: DISCONTINUED | OUTPATIENT
Start: 2019-08-17 | End: 2019-08-20

## 2019-08-16 RX ORDER — SODIUM CHLORIDE 0.9 % (FLUSH) 0.9 %
5-40 SYRINGE (ML) INJECTION AS NEEDED
Status: DISCONTINUED | OUTPATIENT
Start: 2019-08-16 | End: 2019-08-23 | Stop reason: HOSPADM

## 2019-08-16 RX ORDER — HYDROCODONE BITARTRATE AND ACETAMINOPHEN 5; 325 MG/1; MG/1
1 TABLET ORAL
Status: DISCONTINUED | OUTPATIENT
Start: 2019-08-16 | End: 2019-08-23 | Stop reason: HOSPADM

## 2019-08-16 RX ORDER — IPRATROPIUM BROMIDE AND ALBUTEROL SULFATE 2.5; .5 MG/3ML; MG/3ML
3 SOLUTION RESPIRATORY (INHALATION)
Status: DISCONTINUED | OUTPATIENT
Start: 2019-08-16 | End: 2019-08-23 | Stop reason: HOSPADM

## 2019-08-16 RX ORDER — ACETAMINOPHEN 325 MG/1
650 TABLET ORAL
Status: DISCONTINUED | OUTPATIENT
Start: 2019-08-16 | End: 2019-08-23 | Stop reason: HOSPADM

## 2019-08-16 RX ORDER — SODIUM CHLORIDE 0.9 % (FLUSH) 0.9 %
5-40 SYRINGE (ML) INJECTION EVERY 8 HOURS
Status: DISCONTINUED | OUTPATIENT
Start: 2019-08-17 | End: 2019-08-23 | Stop reason: HOSPADM

## 2019-08-16 RX ORDER — SODIUM CHLORIDE, SODIUM LACTATE, POTASSIUM CHLORIDE, CALCIUM CHLORIDE 600; 310; 30; 20 MG/100ML; MG/100ML; MG/100ML; MG/100ML
50 INJECTION, SOLUTION INTRAVENOUS CONTINUOUS
Status: DISCONTINUED | OUTPATIENT
Start: 2019-08-17 | End: 2019-08-22

## 2019-08-16 RX ORDER — BUPROPION HYDROCHLORIDE 150 MG/1
150 TABLET, EXTENDED RELEASE ORAL 2 TIMES DAILY
Status: DISCONTINUED | OUTPATIENT
Start: 2019-08-17 | End: 2019-08-17

## 2019-08-16 RX ORDER — FLUOXETINE HYDROCHLORIDE 20 MG/1
20 CAPSULE ORAL DAILY
Status: DISCONTINUED | OUTPATIENT
Start: 2019-08-17 | End: 2019-08-23 | Stop reason: HOSPADM

## 2019-08-16 RX ORDER — LANOLIN ALCOHOL/MO/W.PET/CERES
800 CREAM (GRAM) TOPICAL DAILY
Status: DISCONTINUED | OUTPATIENT
Start: 2019-08-17 | End: 2019-08-23 | Stop reason: HOSPADM

## 2019-08-16 RX ORDER — THERA TABS 400 MCG
1 TAB ORAL DAILY
Status: DISCONTINUED | OUTPATIENT
Start: 2019-08-17 | End: 2019-08-23 | Stop reason: HOSPADM

## 2019-08-16 RX ORDER — ONDANSETRON 2 MG/ML
4 INJECTION INTRAMUSCULAR; INTRAVENOUS
Status: DISCONTINUED | OUTPATIENT
Start: 2019-08-16 | End: 2019-08-23 | Stop reason: HOSPADM

## 2019-08-16 RX ORDER — MESALAMINE 800 MG/1
800 TABLET, DELAYED RELEASE ORAL 3 TIMES DAILY
Status: DISCONTINUED | OUTPATIENT
Start: 2019-08-17 | End: 2019-08-23 | Stop reason: HOSPADM

## 2019-08-16 RX ORDER — HYDROMORPHONE HYDROCHLORIDE 1 MG/ML
1 INJECTION, SOLUTION INTRAMUSCULAR; INTRAVENOUS; SUBCUTANEOUS
Status: DISCONTINUED | OUTPATIENT
Start: 2019-08-16 | End: 2019-08-23 | Stop reason: HOSPADM

## 2019-08-16 RX ORDER — HEPARIN SODIUM 5000 [USP'U]/ML
5000 INJECTION, SOLUTION INTRAVENOUS; SUBCUTANEOUS EVERY 8 HOURS
Status: DISCONTINUED | OUTPATIENT
Start: 2019-08-17 | End: 2019-08-21

## 2019-08-16 RX ADMIN — SODIUM CHLORIDE, SODIUM LACTATE, POTASSIUM CHLORIDE, AND CALCIUM CHLORIDE 125 ML/HR: 600; 310; 30; 20 INJECTION, SOLUTION INTRAVENOUS at 23:54

## 2019-08-16 RX ADMIN — SODIUM CHLORIDE 1000 ML: 900 INJECTION, SOLUTION INTRAVENOUS at 21:04

## 2019-08-16 NOTE — ED PROVIDER NOTES
71 y/o male with PMHx of HLD, COPD, Crohn's disease and depression, presenting with complaint of abdominal pain. The patient states that he woke up in the middle of the night last night with upper abdominal pain, which progressively worsened throughout the day today. Associated symptoms include fatigue, diaphoresis, chills, nausea, and excessive belching. The pain is currently rated 2/10, was 7/10 at its worst, nonradiating. He has not taken anything to relieve his symptoms. He denies previous episodes of similar pain, but does report having a SBO in the 1980s. No fevers, chest pain, shortness of breath, vomiting, diarrhea, lightheadedness or syncope. The history is provided by the patient. Past Medical History:   Diagnosis Date    [facility]chronic obstructive [facility] disease) ([facility] [address]     Crohn's disease ([facility] [address] [facility]     Depression     Elevated lipids     Hyperlipidemia 8/30/2011       Past Surgical History:   Procedure Laterality Date    COLONOSCOPY N/A 8/7/2019    COLONOSCOPY performed by Jae Fox MD at [facility]. [address], COLON, DIAGNOSTIC  2006    Rep 5 yrs    ENDOSCOPY, COLON, DIAGNOSTIC  2/2012    OK. Rep 5 yrs.  HX GI      surgery for pyloric stenosis as infant    HX SMALL BOWEL RESECTION  about 1990    For Crohn's    HX TONSILLECTOMY           Family History:   Problem Relation Age of Onset    Kidney Disease Mother     Cancer Mother         female cancer ? ?    Parkinsonism Father     Dementia Father     Heart Disease Father         unstable angina    Other Sister         gluten allergy       Social History     Socioeconomic History    Marital status:      Spouse name: Not on file    Number of children: Not on file    Years of education: Not on file    Highest education level: Not on file   Occupational History    Not on file   Social Needs    Financial resource strain: Not on file    Food insecurity:     Worry: Not on file     Inability: Not on file   [place] Transportation needs:     Medical: Not on file     Non-medical: Not on file   Tobacco Use    Smoking status: Former Smoker     Last attempt to quit: 2008     Years since quittin.6    Smokeless tobacco: Never Used   Substance and Sexual Activity    Alcohol use: Yes     Comment: social alcohol    Drug use: Not on file    Sexual activity: Not on file   Lifestyle    Physical activity:     Days per week: Not on file     Minutes per session: Not on file    Stress: Not on file   Relationships    Social connections:     Talks on phone: Not on file     Gets together: Not on file     Attends Scientologist service: Not on file     Active member of club or organization: Not on file     Attends meetings of clubs or organizations: Not on file     Relationship status: Not on file    Intimate partner violence:     Fear of current or ex partner: Not on file     Emotionally abused: Not on file     Physically abused: Not on file     Forced sexual activity: Not on file   Other Topics Concern    Not on file   Social History Narrative    Not on file         ALLERGIES: Azulfidine [sulfasalazine]; Lialda [mesalamine]; and Sulfa (sulfonamide antibiotics)    Review of Systems   Constitutional: Positive for chills, diaphoresis and fatigue. Negative for fever. Respiratory: Negative for shortness of breath. Cardiovascular: Negative for chest pain. Gastrointestinal: Positive for abdominal pain and nausea. Negative for diarrhea and vomiting. Musculoskeletal: Negative for myalgias. Skin: Negative for rash. Neurological: Negative for syncope and light-headedness. All other systems reviewed and are negative. Vitals:    19 1657 19 1825   BP:  115/75   Pulse: 91 82   Resp:  17   Temp:  98.1 °F (36.7 °C)   SpO2: 93% 95%            Physical Exam   Constitutional: He is oriented to person, place, and time. He appears well-developed and well-nourished. No distress.    HENT:   Head: Normocephalic and atraumatic. Eyes: Conjunctivae and EOM are normal.   Cardiovascular: Normal rate, regular rhythm and normal heart sounds. Pulmonary/Chest: Effort normal and breath sounds normal.   Abdominal: Soft. Bowel sounds are normal. He exhibits distension. There is no tenderness. There is no guarding. Neurological: He is alert and oriented to person, place, and time. Skin: Skin is warm and dry. He is not diaphoretic. Nursing note and vitals reviewed. MDM  Number of Diagnoses or Management Options  Crohn's disease with complication, unspecified gastrointestinal tract location [facility]):   SBO (small bowel obstruction) ([facility]):      Amount and/or Complexity of Data Reviewed  Clinical lab tests: ordered and reviewed  Tests in the radiology section of CPT®: ordered and reviewed    Patient Progress  Patient progress: stable         Procedures  ED EKG interpretation:  Rhythm: normal sinus rhythm; and regular . Rate (approx.): 79; Axis: normal; ST/T wave: non-specific changes. 69 y/o male with PMHx of HLD, COPD, Crohn's disease and depression, presenting with complaint of abdominal pain. The patient is well-appearing, currently denies any pain, vitals within normal limits. CT abd/pelvis reveals SBO with findings consistent with Crohn's disease. Labs significant for CHRISTIAN with CR 1.77 and GFR 38, and leukocytosis of 15.3. Will consult general surgery for recommendations. Consult Note - 8:25 PM  I have spoken with Dr. Addison Mendez, discussed patient presentation, exam and diagnostic results. He recommends GI consult and hospitalist admission, and will come see the patient. Consult Note - 8:55 PM   I have spoken with Freeman, discussed patient presentation, exam and diagnostic results. Dr. Shwetha Herrera will see the patient in the morning    Consult Note - 9:39 PM  I have spoken with Dr. Mechelle Garrido. Discussed patient's presentation, history, physical assessment, and available diagnostic results.  He will write orders and admit the patient to the hospital. All available results have been reviewed with the patient and any available family. Care plan has been outlined and questions have been answered. Patient and any available family understands and agrees to need for admission to hospital for further treatment not available in ED. Patient is ready for admission.

## 2019-08-16 NOTE — ED TRIAGE NOTES
Pt reports mid abd pain that began last night. Pt describes the pain as constant and moves from side to side. Pt denies chest pain.

## 2019-08-17 PROBLEM — K56.609 SMALL BOWEL OBSTRUCTION (HCC): Status: ACTIVE | Noted: 2019-08-17

## 2019-08-17 PROBLEM — N17.9 AKI (ACUTE KIDNEY INJURY) (HCC): Status: ACTIVE | Noted: 2019-08-17

## 2019-08-17 LAB
ALBUMIN SERPL-MCNC: 2.9 G/DL (ref 3.5–5)
ALBUMIN/GLOB SERPL: 0.8 {RATIO} (ref 1.1–2.2)
ALP SERPL-CCNC: 90 U/L (ref 45–117)
ALT SERPL-CCNC: 25 U/L (ref 12–78)
ANION GAP SERPL CALC-SCNC: 10 MMOL/L (ref 5–15)
APPEARANCE UR: ABNORMAL
AST SERPL-CCNC: 15 U/L (ref 15–37)
BACTERIA URNS QL MICRO: ABNORMAL /HPF
BASOPHILS # BLD: 0 K/UL (ref 0–0.1)
BASOPHILS NFR BLD: 0 % (ref 0–1)
BILIRUB SERPL-MCNC: 1.4 MG/DL (ref 0.2–1)
BILIRUB UR QL CFM: NEGATIVE
BUN SERPL-MCNC: 28 MG/DL (ref 6–20)
BUN/CREAT SERPL: 18 (ref 12–20)
CALCIUM SERPL-MCNC: 8.8 MG/DL (ref 8.5–10.1)
CHLORIDE SERPL-SCNC: 105 MMOL/L (ref 97–108)
CHOLEST SERPL-MCNC: 154 MG/DL
CO2 SERPL-SCNC: 24 MMOL/L (ref 21–32)
COLOR UR: ABNORMAL
CREAT SERPL-MCNC: 1.57 MG/DL (ref 0.7–1.3)
CRP SERPL-MCNC: 2 MG/DL (ref 0–0.6)
DIFFERENTIAL METHOD BLD: ABNORMAL
EOSINOPHIL # BLD: 0 K/UL (ref 0–0.4)
EOSINOPHIL NFR BLD: 0 % (ref 0–7)
EPITH CASTS URNS QL MICRO: ABNORMAL /LPF
ERYTHROCYTE [DISTWIDTH] IN BLOOD BY AUTOMATED COUNT: 13.7 % (ref 11.5–14.5)
ERYTHROCYTE [SEDIMENTATION RATE] IN BLOOD: 9 MM/HR (ref 0–20)
EST. AVERAGE GLUCOSE BLD GHB EST-MCNC: 120 MG/DL
GLOBULIN SER CALC-MCNC: 3.5 G/DL (ref 2–4)
GLUCOSE SERPL-MCNC: 130 MG/DL (ref 65–100)
GLUCOSE UR STRIP.AUTO-MCNC: NEGATIVE MG/DL
HBA1C MFR BLD: 5.8 % (ref 4.2–6.3)
HCT VFR BLD AUTO: 47.1 % (ref 36.6–50.3)
HDLC SERPL-MCNC: 51 MG/DL
HDLC SERPL: 3 {RATIO} (ref 0–5)
HGB BLD-MCNC: 15.3 G/DL (ref 12.1–17)
HGB UR QL STRIP: NEGATIVE
HYALINE CASTS URNS QL MICRO: ABNORMAL /LPF (ref 0–5)
IMM GRANULOCYTES # BLD AUTO: 0 K/UL
IMM GRANULOCYTES NFR BLD AUTO: 0 %
KETONES UR QL STRIP.AUTO: ABNORMAL MG/DL
LDLC SERPL CALC-MCNC: 59.6 MG/DL (ref 0–100)
LEUKOCYTE ESTERASE UR QL STRIP.AUTO: NEGATIVE
LIPASE SERPL-CCNC: 251 U/L (ref 73–393)
LIPID PROFILE,FLP: ABNORMAL
LYMPHOCYTES # BLD: 0.7 K/UL (ref 0.8–3.5)
LYMPHOCYTES NFR BLD: 6 % (ref 12–49)
MAGNESIUM SERPL-MCNC: 2 MG/DL (ref 1.6–2.4)
MCH RBC QN AUTO: 28.7 PG (ref 26–34)
MCHC RBC AUTO-ENTMCNC: 32.5 G/DL (ref 30–36.5)
MCV RBC AUTO: 88.4 FL (ref 80–99)
MONOCYTES # BLD: 0.6 K/UL (ref 0–1)
MONOCYTES NFR BLD: 5 % (ref 5–13)
NEUTS BAND NFR BLD MANUAL: 4 % (ref 0–6)
NEUTS SEG # BLD: 9.8 K/UL (ref 1.8–8)
NEUTS SEG NFR BLD: 85 % (ref 32–75)
NITRITE UR QL STRIP.AUTO: NEGATIVE
NRBC # BLD: 0 K/UL (ref 0–0.01)
NRBC BLD-RTO: 0 PER 100 WBC
OTHER,OTHU: ABNORMAL
PH UR STRIP: 5 [PH] (ref 5–8)
PHOSPHATE SERPL-MCNC: 3.2 MG/DL (ref 2.6–4.7)
PLATELET # BLD AUTO: 211 K/UL (ref 150–400)
PMV BLD AUTO: 9.8 FL (ref 8.9–12.9)
POTASSIUM SERPL-SCNC: 4.2 MMOL/L (ref 3.5–5.1)
PROT SERPL-MCNC: 6.4 G/DL (ref 6.4–8.2)
PROT UR STRIP-MCNC: NEGATIVE MG/DL
RBC # BLD AUTO: 5.33 M/UL (ref 4.1–5.7)
RBC #/AREA URNS HPF: ABNORMAL /HPF (ref 0–5)
RBC MORPH BLD: ABNORMAL
SODIUM SERPL-SCNC: 139 MMOL/L (ref 136–145)
SP GR UR REFRACTOMETRY: 1.02 (ref 1–1.03)
TRIGL SERPL-MCNC: 217 MG/DL (ref ?–150)
TSH SERPL DL<=0.05 MIU/L-ACNC: 2.91 UIU/ML (ref 0.36–3.74)
UROBILINOGEN UR QL STRIP.AUTO: 0.2 EU/DL (ref 0.2–1)
VLDLC SERPL CALC-MCNC: 43.4 MG/DL
WBC # BLD AUTO: 11.1 K/UL (ref 4.1–11.1)
WBC URNS QL MICRO: ABNORMAL /HPF (ref 0–4)

## 2019-08-17 PROCEDURE — 65270000029 HC RM PRIVATE

## 2019-08-17 PROCEDURE — 85652 RBC SED RATE AUTOMATED: CPT

## 2019-08-17 PROCEDURE — 74011000250 HC RX REV CODE- 250: Performed by: INTERNAL MEDICINE

## 2019-08-17 PROCEDURE — 86140 C-REACTIVE PROTEIN: CPT

## 2019-08-17 PROCEDURE — 85025 COMPLETE CBC W/AUTO DIFF WBC: CPT

## 2019-08-17 PROCEDURE — 84100 ASSAY OF PHOSPHORUS: CPT

## 2019-08-17 PROCEDURE — 84443 ASSAY THYROID STIM HORMONE: CPT

## 2019-08-17 PROCEDURE — 80061 LIPID PANEL: CPT

## 2019-08-17 PROCEDURE — 83036 HEMOGLOBIN GLYCOSYLATED A1C: CPT

## 2019-08-17 PROCEDURE — 74011250636 HC RX REV CODE- 250/636: Performed by: INTERNAL MEDICINE

## 2019-08-17 PROCEDURE — 83690 ASSAY OF LIPASE: CPT

## 2019-08-17 PROCEDURE — C9113 INJ PANTOPRAZOLE SODIUM, VIA: HCPCS | Performed by: INTERNAL MEDICINE

## 2019-08-17 PROCEDURE — 81001 URINALYSIS AUTO W/SCOPE: CPT

## 2019-08-17 PROCEDURE — 74011250637 HC RX REV CODE- 250/637: Performed by: INTERNAL MEDICINE

## 2019-08-17 PROCEDURE — 36415 COLL VENOUS BLD VENIPUNCTURE: CPT

## 2019-08-17 PROCEDURE — 80053 COMPREHEN METABOLIC PANEL: CPT

## 2019-08-17 PROCEDURE — 83735 ASSAY OF MAGNESIUM: CPT

## 2019-08-17 RX ORDER — CALCIUM CARBONATE 200(500)MG
400 TABLET,CHEWABLE ORAL
Status: DISCONTINUED | OUTPATIENT
Start: 2019-08-17 | End: 2019-08-23 | Stop reason: HOSPADM

## 2019-08-17 RX ORDER — BUPROPION HYDROCHLORIDE 150 MG/1
150 TABLET, EXTENDED RELEASE ORAL 2 TIMES DAILY
Status: DISCONTINUED | OUTPATIENT
Start: 2019-08-17 | End: 2019-08-23 | Stop reason: HOSPADM

## 2019-08-17 RX ADMIN — METRONIDAZOLE 500 MG: 500 INJECTION, SOLUTION INTRAVENOUS at 00:23

## 2019-08-17 RX ADMIN — Medication 10 ML: at 22:00

## 2019-08-17 RX ADMIN — FLUOXETINE 20 MG: 20 CAPSULE ORAL at 09:03

## 2019-08-17 RX ADMIN — MESALAMINE 800 MG: 800 TABLET, DELAYED RELEASE ORAL at 15:59

## 2019-08-17 RX ADMIN — MESALAMINE 800 MG: 800 TABLET, DELAYED RELEASE ORAL at 09:03

## 2019-08-17 RX ADMIN — Medication 10 ML: at 05:23

## 2019-08-17 RX ADMIN — LEVOFLOXACIN 750 MG: 5 INJECTION, SOLUTION INTRAVENOUS at 01:46

## 2019-08-17 RX ADMIN — METHYLPREDNISOLONE SODIUM SUCCINATE 40 MG: 125 INJECTION, POWDER, FOR SOLUTION INTRAMUSCULAR; INTRAVENOUS at 05:57

## 2019-08-17 RX ADMIN — HEPARIN SODIUM 5000 UNITS: 5000 INJECTION INTRAVENOUS; SUBCUTANEOUS at 15:59

## 2019-08-17 RX ADMIN — METRONIDAZOLE 500 MG: 500 INJECTION, SOLUTION INTRAVENOUS at 14:03

## 2019-08-17 RX ADMIN — Medication 10 ML: at 01:50

## 2019-08-17 RX ADMIN — BUPROPION HYDROCHLORIDE 150 MG: 150 TABLET, EXTENDED RELEASE ORAL at 09:03

## 2019-08-17 RX ADMIN — MESALAMINE 800 MG: 800 TABLET, DELAYED RELEASE ORAL at 00:24

## 2019-08-17 RX ADMIN — SIMVASTATIN 40 MG: 40 TABLET, FILM COATED ORAL at 00:24

## 2019-08-17 RX ADMIN — HEPARIN SODIUM 5000 UNITS: 5000 INJECTION INTRAVENOUS; SUBCUTANEOUS at 23:50

## 2019-08-17 RX ADMIN — CALCIUM CARBONATE (ANTACID) CHEW TAB 500 MG 400 MG: 500 CHEW TAB at 05:23

## 2019-08-17 RX ADMIN — MESALAMINE 800 MG: 800 TABLET, DELAYED RELEASE ORAL at 21:38

## 2019-08-17 RX ADMIN — BUPROPION HYDROCHLORIDE 150 MG: 150 TABLET, EXTENDED RELEASE ORAL at 00:23

## 2019-08-17 RX ADMIN — CALCIUM CARBONATE (ANTACID) CHEW TAB 500 MG 400 MG: 500 CHEW TAB at 00:24

## 2019-08-17 RX ADMIN — METHYLPREDNISOLONE SODIUM SUCCINATE 40 MG: 125 INJECTION, POWDER, FOR SOLUTION INTRAMUSCULAR; INTRAVENOUS at 21:37

## 2019-08-17 RX ADMIN — SIMVASTATIN 40 MG: 40 TABLET, FILM COATED ORAL at 21:38

## 2019-08-17 RX ADMIN — Medication 10 ML: at 14:03

## 2019-08-17 RX ADMIN — HEPARIN SODIUM 5000 UNITS: 5000 INJECTION INTRAVENOUS; SUBCUTANEOUS at 00:24

## 2019-08-17 RX ADMIN — THERA TABS 1 TABLET: TAB at 09:03

## 2019-08-17 RX ADMIN — BUPROPION HYDROCHLORIDE 150 MG: 150 TABLET, EXTENDED RELEASE ORAL at 21:38

## 2019-08-17 RX ADMIN — HEPARIN SODIUM 5000 UNITS: 5000 INJECTION INTRAVENOUS; SUBCUTANEOUS at 07:36

## 2019-08-17 RX ADMIN — SODIUM CHLORIDE, SODIUM LACTATE, POTASSIUM CHLORIDE, AND CALCIUM CHLORIDE 125 ML/HR: 600; 310; 30; 20 INJECTION, SOLUTION INTRAVENOUS at 10:45

## 2019-08-17 RX ADMIN — SODIUM CHLORIDE 40 MG: 9 INJECTION INTRAMUSCULAR; INTRAVENOUS; SUBCUTANEOUS at 05:58

## 2019-08-17 RX ADMIN — FOLIC ACID TAB 400 MCG 0.8 MG: 400 TAB at 09:03

## 2019-08-17 RX ADMIN — SODIUM CHLORIDE, SODIUM LACTATE, POTASSIUM CHLORIDE, AND CALCIUM CHLORIDE 125 ML/HR: 600; 310; 30; 20 INJECTION, SOLUTION INTRAVENOUS at 19:34

## 2019-08-17 RX ADMIN — METHYLPREDNISOLONE SODIUM SUCCINATE 40 MG: 125 INJECTION, POWDER, FOR SOLUTION INTRAMUSCULAR; INTRAVENOUS at 14:03

## 2019-08-17 NOTE — PROGRESS NOTES
Hospitalist Progress Note                               Juan Jewell MD                                     Answering service: [phone]                               OR [phone] from in house phone                                         Date of Service:  2019  NAME:  Alexis Charles  :  1950  MRN:  [id number]      Admission Summary:      69 Y/O gentlemanwith a past medical history significant for Crohn's disease, dyslipidemia, COPD, and  depression, was in his usual state of health until the day prior to presentation when the patient developed abdominal pain which got progressively worse. The pain was located in the epigastric region,constant, sharp pain, 7/10 in severity. No radiation. No known aggravating or relieving factors. The patient has Crohn's disease, with small bowel obstruction as a result of the Crohn's disease which was resected. Due to the worsening symptoms, the patient presented to the emergency room for further evaluation. No fevers, no rigors, no chills. No record of prior admission to this hospital.  When the patient arrived at the emergency room, CT scan of the abdomen and pelvis showed small bowel obstruction. The emergency room physician consulted general surgeon on-call; conservative therapy was advised. The patient was then referred to the hospitalist service for evaluation for admission. Reason for follow up:       CC: abdominal pain. Patient stated that he felt mildly improved on early am rounds. Denied any nausea, vomiting, fevers or chills. Assessment & Plan:     1) GI: Acute on chronic recurrent exarcerbation of Crohn's Disease with probable resolving small bowel obstruction as seen on CT Abd/Pelvis. Continue NPO, IVFs, IV anti-emetics, IV analgesics, IV steroids. Surgical eval noted and appreciated. GI consult. 2) ID: Crohn's exarcerbation with small bowel obstruction. No definite sepsis. Afebrile, leukocytosis with left sift. Empiric levaquin and metronidazole. 3) Renal: CHRISTIAN on probable CKD stage 3 due to some acute tubular necrosis and dehydration. IVFs, trend Bun/Cr. 4) Resp: COPD without any acute exarcerbation. 5) CVS: Dyslipidemia. 6) Psych: Depression without any behavioral disturbances. 7) Prophylaxis: DVT. 8) Directives: DNR/DNI with a guarded prognosis. D/W patient. 9) Plan: Continue conservative management. D/W patient and nursing. Hospital Problems  Date Reviewed: 8/16/2019          Codes Class Noted POA    * (Principal) Exacerbation of Crohn's disease ([facility] [address] ICD-10-CM: K50.90  ICD-9-CM: 555.9  8/16/2019 Yes              Physical Examination:      Last 24hrs VS reviewed since prior progress note. Most recent are:  Visit Vitals  /59 (BP 1 Location: Right arm, BP Patient Position: At rest)   Pulse 69   Temp 98.3 °F (36.8 °C)   Resp 15   SpO2 94%           Constitutional:  No acute distress. Cee Rbittany HEENT: Head is a traumatic,  Un icteric sclera. Pink conjunctiva,no erythema or discharge. Oral mucous moist, oropharynx benign. Neck supple,    Resp:  CTA bilaterally. No wheezing/rhonchi/rales. No accessory muscle use   CV:  Regular rhythm, normal rate, no murmurs, gallops, rubs    GI:  Soft, non distended, mild generalized tenderness on deep palpation,normoactive bowel sounds, no hepatosplenomegaly    :  No CVA or suprapubic tenderness   Skin  :  No erythema,rash,bullae,dipigmentation     Musculoskeletal:  No edema, warm, 2+ pulses throughout  CNS: Awake and alert.             Intake/Output Summary (Last 24 hours) at 8/17/2019 1517  Last data filed at 8/16/2019 2247  Gross per 24 hour   Intake 1000 ml   Output    Net 1000 ml            Labs:     Recent Labs     08/17/19  0506 08/16/19  1710   WBC 11.1 15.3*   HGB 15.3 18.1*   HCT 47.1 56.0*    316     Recent Labs     08/17/19  0506 08/16/19  1710    136   K 4.2 4.7    99   CO2 24 28   BUN 28* 23* CREA 1.57* 1.77*   * 172*   CA 8.8 10.8*   MG 2.0  --    PHOS 3.2  --      Recent Labs     08/17/19  0506 08/16/19  1710   SGOT 15 21   ALT 25 33   AP 90 130*   TBILI 1.4* 2.0*   TP 6.4 8.8*   ALB 2.9* 4.2   GLOB 3.5 4.6*   LPSE 251  --      No results for input(s): INR, PTP, APTT in the last 72 hours. No lab exists for component: INREXT   No results for input(s): FE, TIBC, PSAT, FERR in the last 72 hours. No results found for: FOL, RBCF   No results for input(s): PH, PCO2, PO2 in the last 72 hours.   Recent Labs     08/16/19 1710   TROIQ <0.05     Lab Results   Component Value Date/Time    Cholesterol, total 154 08/17/2019 05:06 AM    HDL Cholesterol 51 08/17/2019 05:06 AM    LDL, calculated 59.6 08/17/2019 05:06 AM    Triglyceride 217 (H) 08/17/2019 05:06 AM    CHOL/HDL Ratio 3.0 08/17/2019 05:06 AM     No results found for: GLUCPOC  Lab Results   Component Value Date/Time    Color DARK YELLOW 08/17/2019 05:06 AM    Appearance CLOUDY (A) 08/17/2019 05:06 AM    Specific gravity 1.025 08/17/2019 05:06 AM    pH (UA) 5.0 08/17/2019 05:06 AM    Protein NEGATIVE  08/17/2019 05:06 AM    Glucose NEGATIVE  08/17/2019 05:06 AM    Ketone TRACE (A) 08/17/2019 05:06 AM    Bilirubin Negative 10/01/2012 09:00 AM    Urobilinogen 0.2 08/17/2019 05:06 AM    Nitrites NEGATIVE  08/17/2019 05:06 AM    Leukocyte Esterase NEGATIVE  08/17/2019 05:06 AM    Epithelial cells FEW 08/17/2019 05:06 AM    Bacteria 1+ (A) 08/17/2019 05:06 AM    WBC 0-4 08/17/2019 05:06 AM    RBC 0-5 08/17/2019 05:06 AM         Medications Reviewed:     Current Facility-Administered Medications   Medication Dose Route Frequency    buPROPion SR (WELLBUTRIN SR) tablet 150 mg  150 mg Oral BID    calcium carbonate (TUMS) chewable tablet 400 mg [elemental]  400 mg Oral QID PRN    methylPREDNISolone (PF) (Solu-MEDROL) injection 40 mg  40 mg IntraVENous Q8H    pantoprazole (PROTONIX) 40 mg in sodium chloride 0.9% 10 mL injection  40 mg IntraVENous DAILY    FLUoxetine (PROzac) capsule 20 mg  20 mg Oral DAILY    folic acid tablet 0.8 mg  800 mcg Oral DAILY    mesalamine DR (ASACOL HD) tablet 800 mg  800 mg Oral TID    simvastatin (ZOCOR) tablet 40 mg  40 mg Oral QHS    therapeutic multivitamin (THERAGRAN) tablet 1 Tab  1 Tab Oral DAILY    sodium chloride (NS) flush 5-40 mL  5-40 mL IntraVENous Q8H    sodium chloride (NS) flush 5-40 mL  5-40 mL IntraVENous PRN    acetaminophen (TYLENOL) tablet 650 mg  650 mg Oral Q4H PRN    HYDROcodone-acetaminophen (NORCO) 5-325 mg per tablet 1 Tab  1 Tab Oral Q4H PRN    HYDROmorphone (PF) (DILAUDID) injection 1 mg  1 mg IntraVENous Q4H PRN    heparin (porcine) injection 5,000 Units  5,000 Units SubCUTAneous Q8H    lactated Ringers infusion  125 mL/hr IntraVENous CONTINUOUS    ondansetron (ZOFRAN) injection 4 mg  4 mg IntraVENous Q4H PRN    metroNIDAZOLE (FLAGYL) IVPB premix 500 mg  500 mg IntraVENous Q12H    levoFLOXacin (LEVAQUIN) 750 mg in D5W IVPB  750 mg IntraVENous Q24H    albuterol-ipratropium (DUO-NEB) 2.5 MG-0.5 MG/3 ML  3 mL Nebulization Q4H PRN     ______________________________________________________________________  EXPECTED LENGTH OF STAY: - - -  ACTUAL LENGTH OF STAY:          1                 Shahid Cedillo MD

## 2019-08-17 NOTE — PROGRESS NOTES
Primary Nurse Bonny Wong RN and AFUA dudley performed a dual skin assessment on this patient No impairment noted  Adonis score is 23

## 2019-08-17 NOTE — ACP (ADVANCE CARE PLANNING)
Advance Care Planning Note    Name: Loretta Ramirez  YOB: 1950  MRN: [id number]  Admission Date: 8/16/2019  6:27 PM    Date of discussion: 8/17/2019    Active Diagnoses:    Hospital Problems  Date Reviewed: 8/17/2019          Codes Class Noted POA    CHRISTIAN (acute kidney injury) [facility]) ICD-10-CM: N17.9  ICD-9-CM: 584.9  8/17/2019 Yes        Small bowel obstruction ([facility] [address] ICD-10-CM: V09.600  ICD-9-CM: 560.9  8/17/2019 Yes        * (Principal) Exacerbation of Crohn's disease ([facility] [address] ICD-10-CM: K50.90  ICD-9-CM: 555.9  8/16/2019 Yes               These active diagnoses are of sufficient risk that focused discussion on advance care planning is indicated in order to allow the patient to thoughtfully consider personal goals of care, and if situations arise that prevent the ability to personally give input, to ensure appropriate representation of their personal desires for different levels and aggressiveness of care. Discussion:     Persons present and participating in discussion: Loretta Ramirez, and Freya Frasuto MD.    Discussion: Addressed decompensated medical problems, co-morbidities, Advance directives, Prognosis and confirmation of a Surrogate decision maker. Time Spent:     Total time spent face-to-face in education and discussion: 16 minutes.      Freya Frausto MD  8/17/2019  3:33 PM

## 2019-08-17 NOTE — PROGRESS NOTES
Spoke to  re:Consultation,will see pt tomorrow morning since pt feels better with minimal pain. .No order received at this time.

## 2019-08-17 NOTE — PROGRESS NOTES
Progress Note    Patient: Riana Torres MRN: [id number]  SSN: [id number]    YOB: 1950  Age: 70 y.o. Sex: male      Admit Date: 2019    * No surgery found *    Procedure:      Subjective:     Patient feeling better today. No longer having any nausea. Started on solumedrol last night. Objective:     Visit Vitals  /59 (BP 1 Location: Right arm, BP Patient Position: At rest)   Pulse 69   Temp 98.3 °F (36.8 °C)   Resp 15   SpO2 94%       Temp (24hrs), Av.3 °F (36.8 °C), Min:98 °F (36.7 °C), Max:98.6 °F (37 °C)      Physical Exam:    Gen- Alert in NAD  Lungs-CTA  H-RRR  Abd- S/NT/ND    Data Review: images and reports reviewed    Lab Review: All lab results for the last 24 hours reviewed. Assessment:     Hospital Problems  Date Reviewed: 2019          Codes Class Noted POA    CHRISTIAN (acute kidney injury) ([facility] [address] ICD-10-CM: N17.9  ICD-9-CM: 584.9  2019 Yes        Small bowel obstruction ([facility] [address] ICD-10-CM: L40.715  ICD-9-CM: 560.9  2019 Yes        * (Principal) Exacerbation of Crohn's disease ([facility] [address] ICD-10-CM: K50.90  ICD-9-CM: 555.9  2019 Yes              Plan/Recommendations/Medical Decision Making:   Seems to be improving on the steroids. Awaiting GI eval  OK to start clears from my standpoint but would check with GI first.   NO plans for operative intervention at this time.

## 2019-08-17 NOTE — H&P
[address]   HISTORY AND PHYSICAL    Name:  Artur Iqbal  MR#:  [id number]  :  1950  ACCOUNT #:  [de-identified]  ADMIT DATE:  2019    The patient was seen, evaluated and admitted by me on 2019. PRIMARY CARE PHYSICIAN:  Cory Cormier MD.    SOURCE OF INFORMATION:  The patient. CHIEF COMPLAINT:  Abdominal pain. HISTORY OF PRESENT ILLNESS:  This is a 21-year-old man with a past medical history significant for Crohn's disease, dyslipidemia, COPD, depression, was in his usual state of health until yesterday when the patient developed abdominal pain which is progressively getting worse. The pain is located at the epigastric region, constant, sharp pain, 7/10 in severity. No radiation. No known aggravating or relieving factors. The patient has Crohn's disease, has had small bowel obstruction as a result of the Crohn's disease which was resected. Because of worsening symptoms, the patient came to the emergency room for further evaluation. No fever, no rigors, no chills. No record of prior admission to this hospital.  When the patient arrived at the emergency room, CT scan of the abdomen and pelvis shows small bowel obstruction. The emergency room physician consulted general surgeon on-call, conservative therapy was advised. The patient was then referred to the hospitalist service for evaluation for admission. PAST MEDICAL HISTORY:  1. Crohn's disease. 2.  Dyslipidemia. 3.  COPD. 4.  Depression. ALLERGIES:  THE PATIENT IS ALLERGIC TO SULFA AND MESALAMINE. CURRENT MEDICATIONS:  1. Wellbutrin  mg twice daily. 2.  Prozac 20 mg daily. 3.  Folic acid 952 mcg daily. 4.  Asacol 800 mg 3 times daily. 5.  Multivitamin 1 tablet daily. 6.  Zocor 40 mg daily. FAMILY HISTORY:  This was reviewed. His mother had kidney disease. His father had dementia, heart disease and Parkinson's.     PAST SURGICAL HISTORY:  This is significant for small bowel resection secondary to Crohn's disease, tonsillectomy. SOCIAL HISTORY:  The patient is a former smoker, quit tobacco abuse 01/2008. Admits to social consumption of alcohol. REVIEW OF SYSTEMS:  HEAD, EYES, EARS, NOSE AND THROAT:  No headache, no dizziness, no blurring of vision, no photophobia. RESPIRATORY SYSTEM:  No cough, no shortness of breath, no hemoptysis. CARDIOVASCULAR SYSTEM:  No chest pain, no orthopnea, no palpitation. GASTROINTESTINAL SYSTEM:  This is positive for abdominal pain, nausea, no vomiting or diarrhea. No constipation. GENITOURINARY SYSTEM:  No dysuria, no urgency and no frequency. All other systems are reviewed and they are negative. PHYSICAL EXAMINATION:  GENERAL APPEARANCE:  The patient appeared ill, in moderate distress. VITAL SIGNS:  On arrival at the emergency room, temperature 98.1, pulse 91, respiratory rate 17, blood pressure 115/75, oxygen saturation 93% on room air. HEAD:  Normocephalic, atraumatic. EYES:  Normal eye movements. No redness, no drainage, no discharge. EARS:  Normal external ears with no evidence of drainage. NOSE:  No deformity, no drainage. MOUTH AND THROAT:  No visible oral lesion. Dry oral mucosa. NECK:  Neck is supple. No JVD, no thyromegaly. CHEST:  Few expiratory wheezing. No crackles. HEART:  Normal S1 and S2.  Regular. No clinically appreciable murmur. ABDOMEN:  Soft. Diffuse tenderness. No rebound tenderness. No guarding. Normal bowel sounds. CNS:  Alert, oriented x3. No gross focal neurological deficit. EXTREMITIES:  No edema. Pulses 2+ bilaterally. MUSCULOSKELETAL SYSTEM:  No evidence of joint deformity or swelling. SKIN:  No active skin lesions seen in the exposed parts of the body. PSYCHIATRY:  Normal mood and affect. LYMPHATIC SYSTEM:  No cervical lymphadenopathy. DIAGNOSTIC DATA:  EKG shows normal sinus rhythm. No significant ST or T-wave abnormalities.   CT scan of the abdomen and pelvis shows small bowel obstruction with a zone of transition at the level of the terminal ileum, which is a thick wall. Findings are consistent with the patient's history of Crohn's disease. LABORATORY DATA:  Hematology:  WBC 15.3, hemoglobin 18.1, hematocrit 56.0, platelets 620. Cardiac profile, troponin less than 0.05. Chemistry:  Sodium 136, potassium 4.7, chloride 99, CO2 28. Glucose 172, BUN 23, creatinine 1.77, calcium 10.8, total bilirubin 2.0, ALT 33, AST 21, alkaline phosphatase 130, total protein at 8.8, albumin level 4.2, globulin at 4.6. ASSESSMENT:  1. Crohn's disease exacerbation. 2.  Small bowel obstruction. 3.  Dyslipidemia. 4.  Hyperglycemia. 5.  Hypercalcemia. 6.  Leukocytosis. 7.  Chronic obstructive pulmonary disease. 8.  Depression. 9.  Acute renal failure. PLAN:  1. Crohn's disease exacerbation. We will admit the patient for further evaluation and treatment. This is the most likely cause of the patient's abdominal pain. We will start the patient on Levaquin and Flagyl. Gastroenterology consult. We will await further recommendation from the gastroenterologist consulted by the emergency room physician. We will also start the patient on Solu-Medrol. We will monitor the patient's clinical response to treatment. 2.  Small bowel obstruction. This is as a result of the patient's Crohn's disease. We will carry out conservative therapy, which include fluid therapy as advised by the general surgeon consulted by the emergency room physician. We will await further recommendation from the surgeon. 3.  Dyslipidemia. We will resume preadmission medication. 4.  Hyperglycemia. We will check hemoglobin A1c level. The patient has no history of diabetes. 5.  Hypercalcemia. This is mild. We will carry out fluid therapy and repeat the calcium level. 6.  Leukocytosis. This is most likely as a result of the patient's Crohn's disease exacerbation. The patient is not toxic.   We will treat underlying etiological factors. 7.  Chronic obstructive pulmonary disease. .  We will place the patient on DuoNeb as needed. The patient is not on any treatment at home. 8.  Depression. We will resume home medication. 9.  Acute renal failure. This is most likely due to volume depletion. We will carry out fluid therapy and monitor the patient's renal function. If there is no significant improvement with fluid therapy, the patient may require further evaluation including Nephrology consult. OTHER ISSUES:  Code status, the patient is a full code. We will place the patient on heparin for DVT prophylaxis. FUNCTIONAL STATUS PRIOR TO ADMISSION:  The patient is ambulatory without assistant or device.       Robina Vogel MD      [id number]  D:  08/17/2019 5:35  T:  08/17/2019 5:46  JOB #:  [id number]  CC:  Wilda Mcdonough MD

## 2019-08-17 NOTE — ED NOTES
TRANSFER - OUT REPORT:    Verbal report given to Jason Pérez (name) on Samanta Carvajal  being transferred to [facility](unit) for routine progression of care       Report consisted of patients Situation, Background, Assessment and   Recommendations(SBAR). Information from the following report(s) SBAR, ED Summary, [facility] and Recent Results was reviewed with the receiving nurse. Lines:   Peripheral IV 08/16/19 Left Antecubital (Active)   Site Assessment Clean, dry, & intact 8/16/2019  5:11 PM   Phlebitis Assessment 0 8/16/2019  5:11 PM   Infiltration Assessment 0 8/16/2019  5:11 PM   Dressing Status Clean, dry, & intact 8/16/2019  5:11 PM   Dressing Type Transparent 8/16/2019  5:11 PM        Opportunity for questions and clarification was provided.       Patient transported with:   [facility]

## 2019-08-17 NOTE — PROGRESS NOTES
Day #1 of metronidazole  Indication:  abdominal infection  Current regimen:  500mg q8h  Abx regimen: levaquin  Recent Labs     19  1710   WBC 15.3*   CREA 1.77*   BUN 23*     Est CrCl: ~46 ml/min  Temp (24hrs), Av.2 °F (36.8 °C), Min:98 °F (36.7 °C), Max:98.5 °F (36.9 °C)    Cultures: pending    Plan: Change to 500mg q12h

## 2019-08-17 NOTE — PROGRESS NOTES
TRANSFER - IN REPORT:    Verbal report received from [facility] rn(name) on Ajay Romano  being received from ed(unit) for routine progression of care      Report consisted of patients Situation, Background, Assessment and   Recommendations(SBAR). Information from the following report(s) SBAR, Kardex, [facility] and Recent Results was reviewed with the receiving nurse. Opportunity for questions and clarification was provided. Assessment completed upon patients arrival to unit and care assumed.

## 2019-08-17 NOTE — CONSULTS
Surgery Consult    Subjective:      Allen Rodriguez is a 70 y.o. male who presents complaining of abdominal pain. The pain is in the upper abdominal and has been getting worse. He states that he is having chills and sweats. He feels \"quesy\" but not vomiting. He has a history of crohn's for about 50 years. He has been on mesalamine for 30 years. He has a history of a previous bowel resection. Of note he had a colonoscopy 9 days ago that showed   Polypoid colonic mucosa with severely active chronic colitis, ulceration, and granulation tissue. Patient Active Problem List    Diagnosis Date Noted    [facility]     Hyperlipidemia 2011    Crohn disease ([facility] 2011    Encounter for long-term (current) drug use 2011     Past Medical History:   Diagnosis Date    [facility] obstructive [facility] disease) [facility]     Crohn's disease [facility]     Depression     Elevated lipids     Hyperlipidemia 2011      Past Surgical History:   Procedure Laterality Date    COLONOSCOPY N/A 2019    COLONOSCOPY performed by Shalonda Borges MD at [address], DIAGNOSTIC  2006    Rep 5 yrs    ENDOSCOPY, COLON, DIAGNOSTIC  2012    OK. Rep 5 yrs.  HX GI      surgery for pyloric stenosis as infant    HX SMALL BOWEL RESECTION  about     For Crohn's    HX TONSILLECTOMY        Social History     Tobacco Use    Smoking status: Former Smoker     Last attempt to quit: 2008     Years since quittin.6    Smokeless tobacco: Never Used   Substance Use Topics    Alcohol use: Yes     Comment: social alcohol      Family History   Problem Relation Age of Onset    Kidney Disease Mother     Cancer Mother         female cancer ? ?    Parkinsonism Father     Dementia Father     Heart Disease Father         unstable angina    Other Sister         gluten allergy      No current facility-administered medications for this encounter. Current Outpatient Medications   Medication Sig    mesalamine DR (ASACOL HD) 800 mg DR tablet Take 800 mg by mouth three (3) times daily.  FLUoxetine (PROZAC) 20 mg capsule Take 20 mg by mouth daily.  MULTIVITAMIN PO Take  by mouth. Takes one po once daily.  folic acid 323 mcg tablet Take 800 mcg by mouth daily.  simvastatin (ZOCOR) 40 mg tablet TAKE 1 TABLET DAILY    buPROPion SR (WELLBUTRIN SR) 150 mg SR tablet TAKE 1 TABLET TWICE A DAY      Allergies   Allergen Reactions    Azulfidine [Sulfasalazine] Other (comments)     Malaise/hives/fever.  Lialda [Mesalamine] Other (comments)     Dosage of 2400 mg causes severe diarrhea.  Sulfa (Sulfonamide Antibiotics) Other (comments)     Malaise/hives/fever        Review of Systems:    Pertinent items are noted in the History of Present Illness. Objective:        Visit Vitals  /75 (BP 1 Location: Left arm, BP Patient Position: At rest;Sitting)   Pulse 82   Temp 98.1 °F (36.7 °C)   Resp 17   SpO2 95%       Physical Exam:  GENERAL: alert, cooperative, no distress, appears stated age, EYE: negative, THROAT & NECK: normal, LUNG: clear to auscultation bilaterally, HEART: regular rate and rhythm, ABDOMEN: soft non tender non distended. , EXTREMITIES:  extremities normal, atraumatic, no cyanosis or edema, no edema, SKIN: Normal., NEUROLOGIC: negative, PSYCH: non focal    Imaging:  images and reports reviewed  CT- Small bowel obstruction with a zone of transition at the level of the  terminal ileum, which is thick-walled. Findings are consistent with the  patient's history of Crohn's disease. 2. Additional nonobstructing area of wall thickening involving the distal  transverse colon. Also consistent with Crohn's disease. Lab/Data Review: All lab results for the last 24 hours reviewed.     Recent Results (from the past 24 hour(s))   EKG, 12 LEAD, INITIAL    Collection Time: 08/16/19  5:05 PM   Result Value Ref Range    Ventricular Rate 79 BPM Atrial Rate 79 BPM    P-R Interval 158 ms    QRS Duration 84 ms    Q-T Interval 370 ms    QTC Calculation (Bezet) 424 ms    Calculated P Axis 76 degrees    Calculated R Axis 8 degrees    Calculated T Axis 77 degrees    Diagnosis Normal sinus rhythm  No previous ECGs available      CBC WITH AUTOMATED DIFF    Collection Time: 08/16/19  5:10 PM   Result Value Ref Range    WBC 15.3 (H) 4.1 - 11.1 K/uL    RBC 6.31 (H) 4.10 - 5.70 M/uL    HGB 18.1 (H) 12.1 - 17.0 g/dL    HCT 56.0 (H) 36.6 - 50.3 %    MCV 88.7 80.0 - 99.0 FL    MCH 28.7 26.0 - 34.0 PG    MCHC 32.3 30.0 - 36.5 g/dL    RDW 14.5 11.5 - 14.5 %    PLATELET 763 205 - 666 K/uL    MPV 9.7 8.9 - 12.9 FL    NRBC 0.0 0  WBC    ABSOLUTE NRBC 0.00 0.00 - 0.01 K/uL    NEUTROPHILS 81 (H) 32 - 75 %    LYMPHOCYTES 14 12 - 49 %    MONOCYTES 5 5 - 13 %    EOSINOPHILS 0 0 - 7 %    BASOPHILS 0 0 - 1 %    IMMATURE GRANULOCYTES 0 0.0 - 0.5 %    ABS. NEUTROPHILS 12.3 (H) 1.8 - 8.0 K/UL    ABS. LYMPHOCYTES 2.1 0.8 - 3.5 K/UL    ABS. MONOCYTES 0.7 0.0 - 1.0 K/UL    ABS. EOSINOPHILS 0.0 0.0 - 0.4 K/UL    ABS. BASOPHILS 0.1 0.0 - 0.1 K/UL    ABS. IMM. GRANS. 0.1 (H) 0.00 - 0.04 K/UL    DF AUTOMATED     METABOLIC PANEL, COMPREHENSIVE    Collection Time: 08/16/19  5:10 PM   Result Value Ref Range    Sodium 136 136 - 145 mmol/L    Potassium 4.7 3.5 - 5.1 mmol/L    Chloride 99 97 - 108 mmol/L    CO2 28 21 - 32 mmol/L    Anion gap 9 5 - 15 mmol/L    Glucose 172 (H) 65 - 100 mg/dL    BUN 23 (H) 6 - 20 MG/DL    Creatinine 1.77 (H) 0.70 - 1.30 MG/DL    BUN/Creatinine ratio 13 12 - 20      GFR est AA 46 (L) >60 ml/min/1.73m2    GFR est non-AA 38 (L) >60 ml/min/1.73m2    Calcium 10.8 (H) 8.5 - 10.1 MG/DL    Bilirubin, total 2.0 (H) 0.2 - 1.0 MG/DL    ALT (SGPT) 33 12 - 78 U/L    AST (SGOT) 21 15 - 37 U/L    Alk.  phosphatase 130 (H) 45 - 117 U/L    Protein, total 8.8 (H) 6.4 - 8.2 g/dL    Albumin 4.2 3.5 - 5.0 g/dL    Globulin 4.6 (H) 2.0 - 4.0 g/dL    A-G Ratio 0.9 (L) 1.1 - 2.2 TROPONIN I    Collection Time: 08/16/19  5:10 PM   Result Value Ref Range    Troponin-I, Qt. <0.05 <0.05 ng/mL   SAMPLES BEING HELD    Collection Time: 08/16/19  5:10 PM   Result Value Ref Range    SAMPLES BEING HELD 1RED,1BLUE     COMMENT        Add-on orders for these samples will be processed based on acceptable specimen integrity and analyte stability, which may vary by analyte. Assessment/Plan   71 yo M with Crohn's exacerbation causing partial obstruction. No acute indication for operative intervention. Recommend GI consult for the Crohn's exacerbation- If the Obstruction does not resolve with medication alone this may require operative intervention but not at this point.

## 2019-08-18 LAB
ANION GAP SERPL CALC-SCNC: 7 MMOL/L (ref 5–15)
ATRIAL RATE: 79 BPM
BASOPHILS # BLD: 0 K/UL (ref 0–0.1)
BASOPHILS NFR BLD: 0 % (ref 0–1)
BUN SERPL-MCNC: 23 MG/DL (ref 6–20)
BUN/CREAT SERPL: 17 (ref 12–20)
CALCIUM SERPL-MCNC: 8.6 MG/DL (ref 8.5–10.1)
CALCULATED P AXIS, ECG09: 76 DEGREES
CALCULATED R AXIS, ECG10: 8 DEGREES
CALCULATED T AXIS, ECG11: 77 DEGREES
CHLORIDE SERPL-SCNC: 108 MMOL/L (ref 97–108)
CO2 SERPL-SCNC: 25 MMOL/L (ref 21–32)
CREAT SERPL-MCNC: 1.32 MG/DL (ref 0.7–1.3)
DIAGNOSIS, 93000: NORMAL
DIFFERENTIAL METHOD BLD: ABNORMAL
EOSINOPHIL # BLD: 0 K/UL (ref 0–0.4)
EOSINOPHIL NFR BLD: 0 % (ref 0–7)
ERYTHROCYTE [DISTWIDTH] IN BLOOD BY AUTOMATED COUNT: 13.9 % (ref 11.5–14.5)
GLUCOSE SERPL-MCNC: 134 MG/DL (ref 65–100)
HCT VFR BLD AUTO: 40.1 % (ref 36.6–50.3)
HGB BLD-MCNC: 12.8 G/DL (ref 12.1–17)
IMM GRANULOCYTES # BLD AUTO: 0 K/UL
IMM GRANULOCYTES NFR BLD AUTO: 0 %
LYMPHOCYTES # BLD: 0.8 K/UL (ref 0.8–3.5)
LYMPHOCYTES NFR BLD: 7 % (ref 12–49)
MCH RBC QN AUTO: 28.5 PG (ref 26–34)
MCHC RBC AUTO-ENTMCNC: 31.9 G/DL (ref 30–36.5)
MCV RBC AUTO: 89.3 FL (ref 80–99)
MONOCYTES # BLD: 0.2 K/UL (ref 0–1)
MONOCYTES NFR BLD: 2 % (ref 5–13)
NEUTS SEG # BLD: 10.3 K/UL (ref 1.8–8)
NEUTS SEG NFR BLD: 91 % (ref 32–75)
NRBC # BLD: 0 K/UL (ref 0–0.01)
NRBC BLD-RTO: 0 PER 100 WBC
P-R INTERVAL, ECG05: 158 MS
PLATELET # BLD AUTO: 199 K/UL (ref 150–400)
PMV BLD AUTO: 9.6 FL (ref 8.9–12.9)
POTASSIUM SERPL-SCNC: 4.5 MMOL/L (ref 3.5–5.1)
Q-T INTERVAL, ECG07: 370 MS
QRS DURATION, ECG06: 84 MS
QTC CALCULATION (BEZET), ECG08: 424 MS
RBC # BLD AUTO: 4.49 M/UL (ref 4.1–5.7)
RBC MORPH BLD: ABNORMAL
SODIUM SERPL-SCNC: 140 MMOL/L (ref 136–145)
VENTRICULAR RATE, ECG03: 79 BPM
WBC # BLD AUTO: 11.3 K/UL (ref 4.1–11.1)

## 2019-08-18 PROCEDURE — 65270000029 HC RM PRIVATE

## 2019-08-18 PROCEDURE — 74011250636 HC RX REV CODE- 250/636: Performed by: INTERNAL MEDICINE

## 2019-08-18 PROCEDURE — 74011000250 HC RX REV CODE- 250: Performed by: INTERNAL MEDICINE

## 2019-08-18 PROCEDURE — 36415 COLL VENOUS BLD VENIPUNCTURE: CPT

## 2019-08-18 PROCEDURE — C9113 INJ PANTOPRAZOLE SODIUM, VIA: HCPCS | Performed by: INTERNAL MEDICINE

## 2019-08-18 PROCEDURE — 74011250637 HC RX REV CODE- 250/637: Performed by: INTERNAL MEDICINE

## 2019-08-18 PROCEDURE — 85025 COMPLETE CBC W/AUTO DIFF WBC: CPT

## 2019-08-18 PROCEDURE — 80048 BASIC METABOLIC PNL TOTAL CA: CPT

## 2019-08-18 RX ADMIN — MESALAMINE 800 MG: 800 TABLET, DELAYED RELEASE ORAL at 21:35

## 2019-08-18 RX ADMIN — MESALAMINE 800 MG: 800 TABLET, DELAYED RELEASE ORAL at 15:46

## 2019-08-18 RX ADMIN — METHYLPREDNISOLONE SODIUM SUCCINATE 40 MG: 125 INJECTION, POWDER, FOR SOLUTION INTRAMUSCULAR; INTRAVENOUS at 21:30

## 2019-08-18 RX ADMIN — HEPARIN SODIUM 5000 UNITS: 5000 INJECTION INTRAVENOUS; SUBCUTANEOUS at 08:58

## 2019-08-18 RX ADMIN — BUPROPION HYDROCHLORIDE 150 MG: 150 TABLET, EXTENDED RELEASE ORAL at 08:58

## 2019-08-18 RX ADMIN — SIMVASTATIN 40 MG: 40 TABLET, FILM COATED ORAL at 21:34

## 2019-08-18 RX ADMIN — METHYLPREDNISOLONE SODIUM SUCCINATE 40 MG: 125 INJECTION, POWDER, FOR SOLUTION INTRAMUSCULAR; INTRAVENOUS at 14:03

## 2019-08-18 RX ADMIN — METRONIDAZOLE 500 MG: 500 INJECTION, SOLUTION INTRAVENOUS at 14:04

## 2019-08-18 RX ADMIN — SODIUM CHLORIDE 40 MG: 9 INJECTION INTRAMUSCULAR; INTRAVENOUS; SUBCUTANEOUS at 08:57

## 2019-08-18 RX ADMIN — Medication 10 ML: at 14:05

## 2019-08-18 RX ADMIN — MESALAMINE 800 MG: 800 TABLET, DELAYED RELEASE ORAL at 08:58

## 2019-08-18 RX ADMIN — FOLIC ACID TAB 400 MCG 0.8 MG: 400 TAB at 08:58

## 2019-08-18 RX ADMIN — Medication 10 ML: at 05:31

## 2019-08-18 RX ADMIN — HEPARIN SODIUM 5000 UNITS: 5000 INJECTION INTRAVENOUS; SUBCUTANEOUS at 15:46

## 2019-08-18 RX ADMIN — METHYLPREDNISOLONE SODIUM SUCCINATE 40 MG: 125 INJECTION, POWDER, FOR SOLUTION INTRAMUSCULAR; INTRAVENOUS at 05:30

## 2019-08-18 RX ADMIN — METRONIDAZOLE 500 MG: 500 INJECTION, SOLUTION INTRAVENOUS at 02:00

## 2019-08-18 RX ADMIN — BUPROPION HYDROCHLORIDE 150 MG: 150 TABLET, EXTENDED RELEASE ORAL at 21:36

## 2019-08-18 RX ADMIN — LEVOFLOXACIN 750 MG: 5 INJECTION, SOLUTION INTRAVENOUS at 00:00

## 2019-08-18 RX ADMIN — FLUOXETINE 20 MG: 20 CAPSULE ORAL at 08:58

## 2019-08-18 RX ADMIN — Medication 10 ML: at 21:34

## 2019-08-18 RX ADMIN — THERA TABS 1 TABLET: TAB at 08:58

## 2019-08-18 NOTE — PROGRESS NOTES
Hospitalist Progress Note                               Juan Jewell MD                                     Answering service: [phone] or [phone] from in house phone                                         Date of Service:  2019  NAME:  Alexis Charles  :  1950  MRN:  [id number]      Admission Summary:      71 Y/O gentlemanwith a past medical history significant for Crohn's disease, dyslipidemia, COPD, and  depression, was in his usual state of health until the day prior to presentation when the patient developed abdominal pain which got progressively worse. The pain was located in the epigastric region,constant, sharp in nature, 7/10 in severity. No radiation. No known aggravating or relieving factors. The patient has Crohn's disease, with small bowel obstruction as a result of the Crohn's disease which was resected. Due to the worsening symptoms, the patient presented to the emergency room for further evaluation. No fevers, no rigors, no chills. No record of prior admission to this hospital.  When the patient arrived at the emergency room, CT scan of the abdomen and pelvis showed small bowel obstruction. The emergency room physician consulted general surgeon on-call; conservative therapy was advised. The patient was then referred to the hospitalist service for evaluation for admission. Reason for follow up:       CC: abdominal pain. Patient stated that he felt mildly improved on early am rounds. Denied any nausea, vomiting, fevers or chills. Assessment & Plan:     1) GI: Acute on chronic recurrent exarcerbation of Crohn's Disease with probable resolving small bowel obstruction as seen on CT Abd/Pelvis. Continue NPO, IVFs, IV anti-emetics, IV analgesics, IV steroids. Surgical F/U noted and appreciated. GI consult. 2) ID: Crohn's exarcerbation with small bowel obstruction. No definite sepsis. Afebrile, resolving leukocytosis with left sift. Empiric levaquin and metronidazole. 3) Renal: Resolving CHRISTIAN on probable CKD stage 3 due to some acute tubular necrosis and dehydration. IVFs, trend Bun/Cr. 4) Resp: COPD without any acute exarcerbation. 5) CVS: Dyslipidemia. 6) Psych: Depression without any behavioral disturbances. 7) Prophylaxis: DVT. 8) Directives: DNR/DNI with a guarded prognosis. D/W patient. 9) Plan: Continue conservative management. D/W patient and nursing. Hospital Problems  Date Reviewed: 8/17/2019          Codes Class Noted POA    CHRISTIAN (acute kidney injury) ([facility] ICD-10-CM: N17.9  ICD-9-CM: 584.9  8/17/2019 Yes        Small bowel obstruction ([facility] ICD-10-CM: B78.077  ICD-9-CM: 560.9  8/17/2019 Yes        * (Principal) Exacerbation of Crohn's disease ([facility] ICD-10-CM: K50.90  ICD-9-CM: 555.9  8/16/2019 Yes              Physical Examination:      Last 24hrs VS reviewed since prior progress note. Most recent are:  Visit Vitals  /66 (BP 1 Location: Left arm, BP Patient Position: At rest)   Pulse 69   Temp 98.1 °F (36.7 °C)   Resp 12   SpO2 96%           Constitutional:  No acute distress. Suezanne Milton HEENT: Head is a traumatic,  Un icteric sclera. Pink conjunctiva,no erythema or discharge. Oral mucous moist, oropharynx benign. Neck supple,    Resp:  CTA bilaterally. No wheezing/rhonchi/rales. No accessory muscle use   CV:  Regular rhythm, normal rate, no murmurs, gallops, rubs    GI:  Soft, mildly distended, mild generalized tenderness on deep palpation,normoactive bowel sounds, no hepatosplenomegaly    :  No CVA or suprapubic tenderness   Skin  :  No erythema,rash,bullae,dipigmentation     Musculoskeletal:  No edema, warm, 2+ pulses throughout  CNS: Awake and alert.           No intake or output data in the 24 hours ending 08/18/19 1055         Labs:     Recent Labs     08/18/19  0520 08/17/19  0506   WBC 11.3* 11.1   HGB 12.8 15.3   HCT 40.1 47.1    211     Recent Labs     08/18/19  0520 08/17/19  0506 08/16/19  1710    139 136   K 4.5 4.2 4.7    105 99   CO2 25 24 28   BUN 23* 28* 23*   CREA 1.32* 1.57* 1.77*   * 130* 172*   CA 8.6 8.8 10.8*   MG  --  2.0  --    PHOS  --  3.2  --      Recent Labs     08/17/19  0506 08/16/19  1710   SGOT 15 21   ALT 25 33   AP 90 130*   TBILI 1.4* 2.0*   TP 6.4 8.8*   ALB 2.9* 4.2   GLOB 3.5 4.6*   LPSE 251  --      No results for input(s): INR, PTP, APTT in the last 72 hours. No lab exists for component: INREXT, INREXT   No results for input(s): FE, TIBC, PSAT, FERR in the last 72 hours. No results found for: FOL, RBCF   No results for input(s): PH, PCO2, PO2 in the last 72 hours.   Recent Labs     08/16/19  1710   TROIQ <0.05     Lab Results   Component Value Date/Time    Cholesterol, total 154 08/17/2019 05:06 AM    HDL Cholesterol 51 08/17/2019 05:06 AM    LDL, calculated 59.6 08/17/2019 05:06 AM    Triglyceride 217 (H) 08/17/2019 05:06 AM    CHOL/HDL Ratio 3.0 08/17/2019 05:06 AM     No results found for: GLUCPOC  Lab Results   Component Value Date/Time    Color DARK YELLOW 08/17/2019 05:06 AM    Appearance CLOUDY (A) 08/17/2019 05:06 AM    Specific gravity 1.025 08/17/2019 05:06 AM    pH (UA) 5.0 08/17/2019 05:06 AM    Protein NEGATIVE  08/17/2019 05:06 AM    Glucose NEGATIVE  08/17/2019 05:06 AM    Ketone TRACE (A) 08/17/2019 05:06 AM    Bilirubin Negative 10/01/2012 09:00 AM    Urobilinogen 0.2 08/17/2019 05:06 AM    Nitrites NEGATIVE  08/17/2019 05:06 AM    Leukocyte Esterase NEGATIVE  08/17/2019 05:06 AM    Epithelial cells FEW 08/17/2019 05:06 AM    Bacteria 1+ (A) 08/17/2019 05:06 AM    WBC 0-4 08/17/2019 05:06 AM    RBC 0-5 08/17/2019 05:06 AM         Medications Reviewed:     Current Facility-Administered Medications   Medication Dose Route Frequency    buPROPion SR (WELLBUTRIN SR) tablet 150 mg  150 mg Oral BID    calcium carbonate (TUMS) chewable tablet 400 mg [elemental]  400 mg Oral QID PRN    methylPREDNISolone (PF) (Solu-MEDROL) injection 40 mg  40 mg IntraVENous Q8H    pantoprazole (PROTONIX) 40 mg in sodium chloride 0.9% 10 mL injection  40 mg IntraVENous DAILY    FLUoxetine (PROzac) capsule 20 mg  20 mg Oral DAILY    folic acid tablet 0.8 mg  800 mcg Oral DAILY    mesalamine DR (ASACOL HD) tablet 800 mg  800 mg Oral TID    simvastatin (ZOCOR) tablet 40 mg  40 mg Oral QHS    therapeutic multivitamin (THERAGRAN) tablet 1 Tab  1 Tab Oral DAILY    sodium chloride (NS) flush 5-40 mL  5-40 mL IntraVENous Q8H    sodium chloride (NS) flush 5-40 mL  5-40 mL IntraVENous PRN    acetaminophen (TYLENOL) tablet 650 mg  650 mg Oral Q4H PRN    HYDROcodone-acetaminophen (NORCO) 5-325 mg per tablet 1 Tab  1 Tab Oral Q4H PRN    HYDROmorphone (PF) (DILAUDID) injection 1 mg  1 mg IntraVENous Q4H PRN    heparin (porcine) injection 5,000 Units  5,000 Units SubCUTAneous Q8H    lactated Ringers infusion  125 mL/hr IntraVENous CONTINUOUS    ondansetron (ZOFRAN) injection 4 mg  4 mg IntraVENous Q4H PRN    metroNIDAZOLE (FLAGYL) IVPB premix 500 mg  500 mg IntraVENous Q12H    levoFLOXacin (LEVAQUIN) 750 mg in D5W IVPB  750 mg IntraVENous Q24H    albuterol-ipratropium (DUO-NEB) 2.5 MG-0.5 MG/3 ML  3 mL Nebulization Q4H PRN     ______________________________________________________________________  EXPECTED LENGTH OF STAY: - - -  ACTUAL LENGTH OF STAY:          2                 Pk Burns MD

## 2019-08-18 NOTE — PROGRESS NOTES
Progress Note    Patient: Dl Juarez MRN: [id number]  SSN: [id number]    YOB: 1950  Age: 70 y.o. Sex: male      Admit Date: 2019    * No surgery found *    Procedure:      Subjective:     Patient feels back to normal. Has been passing gas. Objective:     Visit Vitals  /69 (BP 1 Location: Right arm, BP Patient Position: At rest)   Pulse 60   Temp 98 °F (36.7 °C)   Resp 16   SpO2 96%       Temp (24hrs), Av °F (36.7 °C), Min:97.8 °F (36.6 °C), Max:98.2 °F (36.8 °C)      Physical Exam:    Gen-Alert in NAD  Abd- S/nt/ND    Data Review: images and reports reviewed    Lab Review: All lab results for the last 24 hours reviewed. Recent Results (from the past 24 hour(s))   METABOLIC PANEL, BASIC    Collection Time: 19  5:20 AM   Result Value Ref Range    Sodium 140 136 - 145 mmol/L    Potassium 4.5 3.5 - 5.1 mmol/L    Chloride 108 97 - 108 mmol/L    CO2 25 21 - 32 mmol/L    Anion gap 7 5 - 15 mmol/L    Glucose 134 (H) 65 - 100 mg/dL    BUN 23 (H) 6 - 20 MG/DL    Creatinine 1.32 (H) 0.70 - 1.30 MG/DL    BUN/Creatinine ratio 17 12 - 20      GFR est AA >60 >60 ml/min/1.73m2    GFR est non-AA 54 (L) >60 ml/min/1.73m2    Calcium 8.6 8.5 - 10.1 MG/DL   CBC WITH AUTOMATED DIFF    Collection Time: 19  5:20 AM   Result Value Ref Range    WBC 11.3 (H) 4.1 - 11.1 K/uL    RBC 4.49 4. 10 - 5.70 M/uL    HGB 12.8 12.1 - 17.0 g/dL    HCT 40.1 36.6 - 50.3 %    MCV 89.3 80.0 - 99.0 FL    MCH 28.5 26.0 - 34.0 PG    MCHC 31.9 30.0 - 36.5 g/dL    RDW 13.9 11.5 - 14.5 %    PLATELET 369 020 - 605 K/uL    MPV 9.6 8.9 - 12.9 FL    NRBC 0.0 0  WBC    ABSOLUTE NRBC 0.00 0.00 - 0.01 K/uL    NEUTROPHILS 91 (H) 32 - 75 %    LYMPHOCYTES 7 (L) 12 - 49 %    MONOCYTES 2 (L) 5 - 13 %    EOSINOPHILS 0 0 - 7 %    BASOPHILS 0 0 - 1 %    IMMATURE GRANULOCYTES 0 %    ABS. NEUTROPHILS 10.3 (H) 1.8 - 8.0 K/UL    ABS. LYMPHOCYTES 0.8 0.8 - 3.5 K/UL    ABS. MONOCYTES 0.2 0.0 - 1.0 K/UL    ABS.  EOSINOPHILS 0.0 0.0 - 0.4 K/UL    ABS. BASOPHILS 0.0 0.0 - 0.1 K/UL    ABS. IMM. GRANS. 0.0 K/UL    DF MANUAL      RBC COMMENTS NORMOCYTIC, NORMOCHROMIC         Assessment:     Hospital Problems  Date Reviewed: 8/17/2019          Codes Class Noted POA    CHRISTIAN (acute kidney injury) ([facility] ICD-10-CM: N17.9  ICD-9-CM: 584.9  8/17/2019 Yes        Small bowel obstruction ([facility] ICD-10-CM: Z43.862  ICD-9-CM: 560.9  8/17/2019 Yes        * (Principal) Exacerbation of Crohn's disease ([facility] ICD-10-CM: K50.90  ICD-9-CM: 555.9  8/16/2019 Yes              Plan/Recommendations/Medical Decision Making:   SBO- seems to be improving-   The patient states that he saw GI who approved Clear liquid diet but it wasn't ordered  Clear liquids ordered.

## 2019-08-18 NOTE — CONSULTS
[address] [address], [address], [address] [facility], [address] [place] [address] NOTE          NAME:  Gela Zheng   :   1950   MRN:   [id number]         Consult Date: 2019 1:07 PM    Referring provider: Alonzo Gupta    Chief Complaint: abdominal pain    History of Present Illness: 71 yo M w/ COPD, Crohn's dz, s/p segmental ileal resection in early , been on Asacol for years, recent colonoscopy w/ active wero-appendiceal dz Jude Schirmer), presenting for further evaluation of acute onset abdominal pain, SBO seen on CT w/ transition point in distal ileum proximal to relatively long segment chronic appearing ileal stricture, GI and surgery consulted for assistance in further evaluation & mgmt. - associated with vomiting  - hit hard  - he is feeling much better as of yesterday, even moreso today after streoids and NPO  - reports this happening every so often, but never remembers it being this acute or severe- at least not since his resection in the early   - been seeing Irv Francie Baumgarten for years         PMH:  Past Medical History:   Diagnosis Date    [facility] (chronic obstructive pulmonary disease) ([facility]     Crohn's disease ([facility]     Depression     Elevated lipids     Hyperlipidemia 2011       PSH:  Past Surgical History:   Procedure Laterality Date    COLONOSCOPY N/A 2019    COLONOSCOPY performed by Chanda Bhakta MD at [facility], DIAGNOSTIC      Rep 5 yrs    ENDOSCOPY, COLON, DIAGNOSTIC  2012    OK. Rep 5 yrs.  HX GI      surgery for pyloric stenosis as infant    HX SMALL BOWEL RESECTION  about     For Crohn's    HX TONSILLECTOMY         Allergies: Allergies   Allergen Reactions    Azulfidine [Sulfasalazine] Other (comments)     Malaise/hives/fever.  Lialda [Mesalamine] Other (comments)     Dosage of 2400 mg causes severe diarrhea.     Sulfa (Sulfonamide Antibiotics) Other (comments) Malaise/hives/fever       Home Medications:  Prior to Admission Medications   Prescriptions Last Dose Informant Patient Reported? Taking? FLUoxetine (PROZAC) 20 mg capsule 8/16/2019 at Unknown time  Yes Yes   Sig: Take 20 mg by mouth daily. MULTIVITAMIN PO 8/16/2019 at Unknown time  Yes Yes   Sig: Take  by mouth. Takes one po once daily. buPROPion SR (WELLBUTRIN SR) 150 mg SR tablet 8/16/2019 at Unknown time  No Yes   Sig: TAKE 1 TABLET TWICE A DAY   folic acid 809 mcg tablet 8/16/2019 at Unknown time  Yes Yes   Sig: Take 800 mcg by mouth daily. mesalamine DR (ASACOL HD) 800 mg DR tablet 8/16/2019 at Unknown time  Yes Yes   Sig: Take 800 mg by mouth three (3) times daily.    simvastatin (ZOCOR) 40 mg tablet 8/15/2019 at Unknown time  No Yes   Sig: TAKE 1 TABLET DAILY      Facility-Administered Medications: None       Hospital Medications:  Current Facility-Administered Medications   Medication Dose Route Frequency    buPROPion SR (WELLBUTRIN SR) tablet 150 mg  150 mg Oral BID    calcium carbonate (TUMS) chewable tablet 400 mg [elemental]  400 mg Oral QID PRN    methylPREDNISolone (PF) (Solu-MEDROL) injection 40 mg  40 mg IntraVENous Q8H    pantoprazole (PROTONIX) 40 mg in sodium chloride 0.9% 10 mL injection  40 mg IntraVENous DAILY    FLUoxetine (PROzac) capsule 20 mg  20 mg Oral DAILY    folic acid tablet 0.8 mg  800 mcg Oral DAILY    mesalamine DR (ASACOL HD) tablet 800 mg  800 mg Oral TID    simvastatin (ZOCOR) tablet 40 mg  40 mg Oral QHS    therapeutic multivitamin (THERAGRAN) tablet 1 Tab  1 Tab Oral DAILY    sodium chloride (NS) flush 5-40 mL  5-40 mL IntraVENous Q8H    sodium chloride (NS) flush 5-40 mL  5-40 mL IntraVENous PRN    acetaminophen (TYLENOL) tablet 650 mg  650 mg Oral Q4H PRN    HYDROcodone-acetaminophen (NORCO) 5-325 mg per tablet 1 Tab  1 Tab Oral Q4H PRN    HYDROmorphone (PF) (DILAUDID) injection 1 mg  1 mg IntraVENous Q4H PRN    heparin (porcine) injection 5,000 Units 5,000 Units SubCUTAneous Q8H    lactated Ringers infusion  125 mL/hr IntraVENous CONTINUOUS    ondansetron (ZOFRAN) injection 4 mg  4 mg IntraVENous Q4H PRN    metroNIDAZOLE (FLAGYL) IVPB premix 500 mg  500 mg IntraVENous Q12H    levoFLOXacin (LEVAQUIN) 750 mg in D5W IVPB  750 mg IntraVENous Q24H    albuterol-ipratropium (DUO-NEB) 2.5 MG-0.5 MG/3 ML  3 mL Nebulization Q4H PRN       Social History:  Social History     Tobacco Use    Smoking status: Former Smoker     Last attempt to quit: 2008     Years since quittin.6    Smokeless tobacco: Never Used   Substance Use Topics    Alcohol use: Yes     Comment: social alcohol       Family History:  Family History   Problem Relation Age of Onset    Kidney Disease Mother     Cancer Mother         female cancer ? ?    Parkinsonism Father     Dementia Father     Heart Disease Father         unstable angina    Other Sister         gluten allergy       Review of Systems:  Constitutional: - fever, - chills, - weight loss  Eyes:   - visual changes  ENT:   - sore throat, - tongue or lip swelling  Respiratory:  - cough, - dyspnea  Cards:  - chest pain, - diaphoresis, - palpitations, - lower extremity edema  GI:   See HPI  :  - frequency, - dysuria  Integument:  - rash, - pruritus  Heme:  - easy bruising, - gum/nose bleeding  Musculoskel: - for myalgias,  - back pain, - muscle weakness  Neuro:  - headaches, - dizziness  Psych: - feelings of anxiety, - feelings of depression     Objective:     Patient Vitals for the past 8 hrs:   BP Temp Pulse Resp SpO2   19 0827 132/66 98.1 °F (36.7 °C) 69 12 96 %     No intake/output data recorded.  1901 -  0700  In: 1000 [I.V.:1000]  Out: -     PHYSICAL EXAM:  General: Well appearing, cooperative, in no acute distress    HEENT: Atraumatic, Anicteric sclerae. Chest/ Lungs: Adequate air movement bilaterally, no overt wheezing, rhonchi or rales.   Heart:  Regular rate & rhythm, no murmurs  Abdomen: Not hard, tympanic, not Soft,Non tender.  +Bowel sounds  Extremities: No clubbing or cyanosis  Neurologic:  Grossly alert & oriented  Skin:   No jaundice, no rashes  Psych:   Appropriate mood and affect, appropriate insight     Data Review     Recent Labs     08/18/19  0520 08/17/19  0506   WBC 11.3* 11.1   HGB 12.8 15.3   HCT 40.1 47.1    211     Recent Labs     08/18/19  0520 08/17/19  0506    139   K 4.5 4.2    105   CO2 25 24   BUN 23* 28*   CREA 1.32* 1.57*   * 130*   PHOS  --  3.2   CA 8.6 8.8     Recent Labs     08/17/19  0506 08/16/19  1710   SGOT 15 21   AP 90 130*   TP 6.4 8.8*   ALB 2.9* 4.2   GLOB 3.5 4.6*   LPSE 251  --      No results for input(s): INR, PTP, APTT in the last 72 hours. No lab exists for component: INREXT    Radiology Review    - my interpretation >  distal ileal narrowing over an 8-10 cm of bowel is clearly present, not associated with stranding around this segment of bowel, perhaps implying more chronic fibrotic dz, mucosal thickening    IMPRESSION:     1. Small bowel obstruction with a zone of transition at the level of the  terminal ileum, which is thick-walled. Findings are consistent with the  patient's history of Crohn's disease. 2. Additional nonobstructing area of wall thickening involving the distal  transverse colon. Also consistent with Crohn's disease.        Assessment:   71 yo M w/ COPD, Crohn's dz, s/p segmental ileal resection in early 1990s, been on Asacol, recent colonoscopy w/ active wero-appendiceal dz, presenting for further evaluation of acute onset abdominal pain, found to have pSBO w/ distal ileal transition point and possible low grade chronic appearing distal ileal stricture  - chronic appearing narrowing may have some inflammatory component- he is feeling a lot better after all just w/ steroids and NPO  - can hopefully get him through this w/ steroids & slowly advancing diet- this appears to be a relatively long appearing segment of ileum - Even with relatively mild sounding wero-appendiceal dz, in light of his intermittent obstructive sounding symptoms over the years, once he is feeling better, a CTE/MRE will be helpful in evaluating distal ileal dz burden- and will help guide any potential change in therapy       Plan:   · Advance to clears and see how he does  · Continue steroids  · Will continue to follow     Signed By: Whitney Martin., MD     8/18/2019  1:07 PM

## 2019-08-19 ENCOUNTER — APPOINTMENT (OUTPATIENT)
Dept: GENERAL RADIOLOGY | Age: 69
DRG: 385 | End: 2019-08-19
Attending: INTERNAL MEDICINE
Payer: MEDICARE

## 2019-08-19 LAB
ANION GAP SERPL CALC-SCNC: 9 MMOL/L (ref 5–15)
BASOPHILS # BLD: 0 K/UL (ref 0–0.1)
BASOPHILS NFR BLD: 0 % (ref 0–1)
BUN SERPL-MCNC: 27 MG/DL (ref 6–20)
BUN/CREAT SERPL: 21 (ref 12–20)
CALCIUM SERPL-MCNC: 8.9 MG/DL (ref 8.5–10.1)
CHLORIDE SERPL-SCNC: 106 MMOL/L (ref 97–108)
CO2 SERPL-SCNC: 24 MMOL/L (ref 21–32)
CREAT SERPL-MCNC: 1.27 MG/DL (ref 0.7–1.3)
DIFFERENTIAL METHOD BLD: ABNORMAL
EOSINOPHIL # BLD: 0 K/UL (ref 0–0.4)
EOSINOPHIL NFR BLD: 0 % (ref 0–7)
ERYTHROCYTE [DISTWIDTH] IN BLOOD BY AUTOMATED COUNT: 13.9 % (ref 11.5–14.5)
GLUCOSE SERPL-MCNC: 129 MG/DL (ref 65–100)
HCT VFR BLD AUTO: 41.8 % (ref 36.6–50.3)
HGB BLD-MCNC: 13.2 G/DL (ref 12.1–17)
IMM GRANULOCYTES # BLD AUTO: 0.1 K/UL (ref 0–0.04)
IMM GRANULOCYTES NFR BLD AUTO: 1 % (ref 0–0.5)
LYMPHOCYTES # BLD: 0.7 K/UL (ref 0.8–3.5)
LYMPHOCYTES NFR BLD: 6 % (ref 12–49)
MCH RBC QN AUTO: 28.4 PG (ref 26–34)
MCHC RBC AUTO-ENTMCNC: 31.6 G/DL (ref 30–36.5)
MCV RBC AUTO: 89.9 FL (ref 80–99)
MONOCYTES # BLD: 0.4 K/UL (ref 0–1)
MONOCYTES NFR BLD: 3 % (ref 5–13)
NEUTS SEG # BLD: 11 K/UL (ref 1.8–8)
NEUTS SEG NFR BLD: 90 % (ref 32–75)
NRBC # BLD: 0 K/UL (ref 0–0.01)
NRBC BLD-RTO: 0 PER 100 WBC
PLATELET # BLD AUTO: 186 K/UL (ref 150–400)
PLATELET COMMENTS,PCOM: ABNORMAL
PMV BLD AUTO: 9.8 FL (ref 8.9–12.9)
POTASSIUM SERPL-SCNC: 4 MMOL/L (ref 3.5–5.1)
RBC # BLD AUTO: 4.65 M/UL (ref 4.1–5.7)
RBC MORPH BLD: ABNORMAL
SODIUM SERPL-SCNC: 139 MMOL/L (ref 136–145)
WBC # BLD AUTO: 12.2 K/UL (ref 4.1–11.1)

## 2019-08-19 PROCEDURE — 74011250637 HC RX REV CODE- 250/637: Performed by: INTERNAL MEDICINE

## 2019-08-19 PROCEDURE — 74022 RADEX COMPL AQT ABD SERIES: CPT

## 2019-08-19 PROCEDURE — C9113 INJ PANTOPRAZOLE SODIUM, VIA: HCPCS | Performed by: INTERNAL MEDICINE

## 2019-08-19 PROCEDURE — 65270000029 HC RM PRIVATE

## 2019-08-19 PROCEDURE — 80048 BASIC METABOLIC PNL TOTAL CA: CPT

## 2019-08-19 PROCEDURE — 36415 COLL VENOUS BLD VENIPUNCTURE: CPT

## 2019-08-19 PROCEDURE — 94760 N-INVAS EAR/PLS OXIMETRY 1: CPT

## 2019-08-19 PROCEDURE — 74011250636 HC RX REV CODE- 250/636: Performed by: INTERNAL MEDICINE

## 2019-08-19 PROCEDURE — 85025 COMPLETE CBC W/AUTO DIFF WBC: CPT

## 2019-08-19 PROCEDURE — 74011000250 HC RX REV CODE- 250: Performed by: INTERNAL MEDICINE

## 2019-08-19 RX ADMIN — Medication 10 ML: at 06:40

## 2019-08-19 RX ADMIN — HEPARIN SODIUM 5000 UNITS: 5000 INJECTION INTRAVENOUS; SUBCUTANEOUS at 08:52

## 2019-08-19 RX ADMIN — BUPROPION HYDROCHLORIDE 150 MG: 150 TABLET, EXTENDED RELEASE ORAL at 08:53

## 2019-08-19 RX ADMIN — SODIUM CHLORIDE 40 MG: 9 INJECTION INTRAMUSCULAR; INTRAVENOUS; SUBCUTANEOUS at 08:52

## 2019-08-19 RX ADMIN — MESALAMINE 800 MG: 800 TABLET, DELAYED RELEASE ORAL at 16:33

## 2019-08-19 RX ADMIN — THERA TABS 1 TABLET: TAB at 08:53

## 2019-08-19 RX ADMIN — LEVOFLOXACIN 750 MG: 5 INJECTION, SOLUTION INTRAVENOUS at 00:20

## 2019-08-19 RX ADMIN — HEPARIN SODIUM 5000 UNITS: 5000 INJECTION INTRAVENOUS; SUBCUTANEOUS at 16:33

## 2019-08-19 RX ADMIN — SODIUM CHLORIDE, SODIUM LACTATE, POTASSIUM CHLORIDE, AND CALCIUM CHLORIDE 125 ML/HR: 600; 310; 30; 20 INJECTION, SOLUTION INTRAVENOUS at 10:46

## 2019-08-19 RX ADMIN — BUPROPION HYDROCHLORIDE 150 MG: 150 TABLET, EXTENDED RELEASE ORAL at 22:13

## 2019-08-19 RX ADMIN — MESALAMINE 800 MG: 800 TABLET, DELAYED RELEASE ORAL at 08:53

## 2019-08-19 RX ADMIN — METRONIDAZOLE 500 MG: 500 INJECTION, SOLUTION INTRAVENOUS at 02:46

## 2019-08-19 RX ADMIN — SODIUM CHLORIDE, SODIUM LACTATE, POTASSIUM CHLORIDE, AND CALCIUM CHLORIDE 125 ML/HR: 600; 310; 30; 20 INJECTION, SOLUTION INTRAVENOUS at 19:47

## 2019-08-19 RX ADMIN — SIMVASTATIN 40 MG: 40 TABLET, FILM COATED ORAL at 22:13

## 2019-08-19 RX ADMIN — Medication 10 ML: at 13:36

## 2019-08-19 RX ADMIN — FOLIC ACID TAB 400 MCG 0.8 MG: 400 TAB at 08:53

## 2019-08-19 RX ADMIN — MESALAMINE 800 MG: 800 TABLET, DELAYED RELEASE ORAL at 22:13

## 2019-08-19 RX ADMIN — HEPARIN SODIUM 5000 UNITS: 5000 INJECTION INTRAVENOUS; SUBCUTANEOUS at 00:20

## 2019-08-19 RX ADMIN — METHYLPREDNISOLONE SODIUM SUCCINATE 40 MG: 125 INJECTION, POWDER, FOR SOLUTION INTRAMUSCULAR; INTRAVENOUS at 13:35

## 2019-08-19 RX ADMIN — METHYLPREDNISOLONE SODIUM SUCCINATE 40 MG: 125 INJECTION, POWDER, FOR SOLUTION INTRAMUSCULAR; INTRAVENOUS at 06:40

## 2019-08-19 RX ADMIN — FLUOXETINE 20 MG: 20 CAPSULE ORAL at 08:53

## 2019-08-19 RX ADMIN — Medication 10 ML: at 20:28

## 2019-08-19 RX ADMIN — METRONIDAZOLE 500 MG: 500 INJECTION, SOLUTION INTRAVENOUS at 13:36

## 2019-08-19 RX ADMIN — METHYLPREDNISOLONE SODIUM SUCCINATE 40 MG: 125 INJECTION, POWDER, FOR SOLUTION INTRAMUSCULAR; INTRAVENOUS at 22:14

## 2019-08-19 NOTE — PROGRESS NOTES
Tolerating clear liquids, passing gas with small bowel movement. Will advance to full liquids and assess tolerance.

## 2019-08-19 NOTE — PROGRESS NOTES
Hospitalist Progress Note                               Omar Sharp MD                                     Answering service: [phone]                               OR [phone] from in house phone                                         Date of Service:  2019  NAME:  Misbah Coughlin  :  1950  MRN:  [id number]      Admission Summary:      71 Y/O gentlemanwith a past medical history significant for Crohn's disease, dyslipidemia, COPD, and  depression, was in his usual state of health until the day prior to presentation when the patient developed abdominal pain which got progressively worse. The pain was located in the epigastric region,constant, sharp in nature, 7/10 in severity. No radiation. No known aggravating or relieving factors. The patient has Crohn's disease, with small bowel obstruction as a result of the Crohn's disease which was resected. Due to the worsening symptoms, the patient presented to the emergency room for further evaluation. No fevers, no rigors, no chills. No record of prior admission to this hospital.  When the patient arrived at the emergency room, CT scan of the abdomen and pelvis showed small bowel obstruction. The emergency room physician consulted general surgeon on-call; conservative therapy was advised. The patient was then referred to the hospitalist service for evaluation for admission. Reason for follow up:       CC: abdominal pain. Patient stated that he felt mildly improved on early am rounds. Denied any nausea, vomiting, fevers or chills. Stated that he had increased flatus last evening and felt that his abdomen was less distended. Assessment & Plan:     1) GI: Acute on chronic recurrent exarcerbation of Crohn's Disease with probable resolving small bowel obstruction as seen on CT Abd/Pelvis. H/O Bowel resection. Continue IVFs, IV anti-emetics, IV analgesics, IV steroids.  Surgical and GI F/Us noted and appreciated    2) ID: Crohn's exarcerbation with small bowel obstruction. No definite sepsis. Afebrile, resolving leukocytosis with left sift. Empiric levaquin and metronidazole. 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 IVFs, trend Bun/Cr. 4) Resp: COPD without any acute exarcerbation. 5) CVS: Dyslipidemia. 6) Psych: Depression without any behavioral disturbances. 7) Prophylaxis: DVT. 8) Nutrition: Trial of clear liquids. 9) Directives: DNR/DNI with a guarded prognosis. D/W patient. 10) Plan: Continue conservative management. Discussed plan with patient and Nursing. Hospital Problems  Date Reviewed: 8/17/2019          Codes Class Noted POA    [facility]ST[facility] (acute kidney [facility]) ([facility] ICD-10-CM: N17.9  ICD-9-CM: 584.9  8/17/2019 Yes        Small bowel obstruction ([facility] ICD-10-CM: D53.291  ICD-9-CM: 560.9  8/17/2019 Yes        * (Principal) Exacerbation of Crohn's disease ([facility] ICD-10-CM: K50.90  ICD-9-CM: 555.9  8/16/2019 Yes              Physical Examination:      Last 24hrs VS reviewed since prior progress note. Most recent are:  Visit Vitals  /65 (BP 1 Location: Right arm, BP Patient Position: At rest;Supine)   Pulse 60   Temp 98.3 °F (36.8 °C)   Resp 14   SpO2 94%           Constitutional:  No acute distress. HEENT: Head is a traumatic,  Un icteric sclera. Pink conjunctiva,no erythema or discharge. Oral mucous moist, oropharynx benign. Neck supple,    Resp:  B/L air entry. CV:  Regular rhythm, normal rate, no murmurs, gallops, rubs    GI:  Soft, mildly distended, mild generalized tenderness on deep palpation,normoactive bowel sounds, no hepatosplenomegaly    :  No CVA or suprapubic tenderness   Skin  :  Intact    Musculoskeletal:  No edema, warm, 2+ pulses throughout  CNS: Awake, alert and oriented.           No intake or output data in the 24 hours ending 08/19/19 1206         Labs:     Recent Labs 08/19/19  0312 08/18/19  0520   WBC 12.2* 11.3*   HGB 13.2 12.8   HCT 41.8 40.1    199     Recent Labs     08/19/19  0312 08/18/19  0520 08/17/19  0506    140 139   K 4.0 4.5 4.2    108 105   CO2 24 25 24   BUN 27* 23* 28*   CREA 1.27 1.32* 1.57*   * 134* 130*   CA 8.9 8.6 8.8   MG  --   --  2.0   PHOS  --   --  3.2     Recent Labs     08/17/19  0506 08/16/19  1710   SGOT 15 21   ALT 25 33   AP 90 130*   TBILI 1.4* 2.0*   TP 6.4 8.8*   ALB 2.9* 4.2   GLOB 3.5 4.6*   LPSE 251  --      No results for input(s): INR, PTP, APTT in the last 72 hours. No lab exists for component: INREXT, INREXT   No results for input(s): FE, TIBC, PSAT, FERR in the last 72 hours. No results found for: FOL, RBCF   No results for input(s): PH, PCO2, PO2 in the last 72 hours.   Recent Labs     08/16/19  1710   TROIQ <0.05     Lab Results   Component Value Date/Time    Cholesterol, total 154 08/17/2019 05:06 AM    HDL Cholesterol 51 08/17/2019 05:06 AM    LDL, calculated 59.6 08/17/2019 05:06 AM    Triglyceride 217 (H) 08/17/2019 05:06 AM    CHOL/HDL Ratio 3.0 08/17/2019 05:06 AM     No results found for: GLUCPOC  Lab Results   Component Value Date/Time    Color DARK YELLOW 08/17/2019 05:06 AM    Appearance CLOUDY (A) 08/17/2019 05:06 AM    Specific gravity 1.025 08/17/2019 05:06 AM    pH (UA) 5.0 08/17/2019 05:06 AM    Protein NEGATIVE  08/17/2019 05:06 AM    Glucose NEGATIVE  08/17/2019 05:06 AM    Ketone TRACE (A) 08/17/2019 05:06 AM    Bilirubin Negative 10/01/2012 09:00 AM    Urobilinogen 0.2 08/17/2019 05:06 AM    Nitrites NEGATIVE  08/17/2019 05:06 AM    Leukocyte Esterase NEGATIVE  08/17/2019 05:06 AM    Epithelial cells FEW 08/17/2019 05:06 AM    Bacteria 1+ (A) 08/17/2019 05:06 AM    WBC 0-4 08/17/2019 05:06 AM    RBC 0-5 08/17/2019 05:06 AM         Medications Reviewed:     Current Facility-Administered Medications   Medication Dose Route Frequency    buPROPion SR (WELLBUTRIN SR) tablet 150 mg  150 mg Oral BID    calcium carbonate (TUMS) chewable tablet 400 mg [elemental]  400 mg Oral QID PRN    methylPREDNISolone (PF) (Solu-MEDROL) injection 40 mg  40 mg IntraVENous Q8H    pantoprazole (PROTONIX) 40 mg in sodium chloride 0.9% 10 mL injection  40 mg IntraVENous DAILY    FLUoxetine (PROzac) capsule 20 mg  20 mg Oral DAILY    folic acid tablet 0.8 mg  800 mcg Oral DAILY    mesalamine DR (ASACOL HD) tablet 800 mg  800 mg Oral TID    simvastatin (ZOCOR) tablet 40 mg  40 mg Oral QHS    therapeutic multivitamin (THERAGRAN) tablet 1 Tab  1 Tab Oral DAILY    sodium chloride (NS) flush 5-40 mL  5-40 mL IntraVENous Q8H    sodium chloride (NS) flush 5-40 mL  5-40 mL IntraVENous PRN    acetaminophen (TYLENOL) tablet 650 mg  650 mg Oral Q4H PRN    HYDROcodone-acetaminophen (NORCO) 5-325 mg per tablet 1 Tab  1 Tab Oral Q4H PRN    HYDROmorphone (PF) (DILAUDID) injection 1 mg  1 mg IntraVENous Q4H PRN    heparin (porcine) injection 5,000 Units  5,000 Units SubCUTAneous Q8H    lactated Ringers infusion  125 mL/hr IntraVENous CONTINUOUS    ondansetron (ZOFRAN) injection 4 mg  4 mg IntraVENous Q4H PRN    metroNIDAZOLE (FLAGYL) IVPB premix 500 mg  500 mg IntraVENous Q12H    levoFLOXacin (LEVAQUIN) 750 mg in D5W IVPB  750 mg IntraVENous Q24H    albuterol-ipratropium (DUO-NEB) 2.5 MG-0.5 MG/3 ML  3 mL Nebulization Q4H PRN     ______________________________________________________________________  EXPECTED LENGTH OF STAY: - - -  ACTUAL LENGTH OF STAY:          3                 Roberto Molina MD

## 2019-08-19 NOTE — PROGRESS NOTES
Informed MD of patient's request for Tylenol PM or something to assist with sleeping. While on phone, MD reviewed Xray results, ordered pt to be placed back on NPO and to contact surgery to re-evaluate patient.

## 2019-08-19 NOTE — PROGRESS NOTES
Reviewed transitions of care, and discussed in rounds. There are currently no cm orders at this time. CM noted General surgery consult. CM noted patient ambulating in hallway with wife. Will follow.     Popeye Nazario [facility]

## 2019-08-19 NOTE — PROGRESS NOTES
Called by RN to re-evaluate patient for worsening abdominal pain with distention and calf pain whilst walking. On arrival at the bedside, patient was in no acute distress. C/O worsening abdominal pain and distention not present on early am rounds. Also C/O some B/L calf pain whist walking. Exam: GI: Distended abdomen, hypoactive bowel sounds, generalized tenderness. LAMINE: No asymmetry lower extremities. No tenderness on palpation of B/l calves. Plan: Stat abdominal Xray with general surgical re-evaluation. D/W patient, patient's wife and RN Shahida Gallegos.

## 2019-08-19 NOTE — PROGRESS NOTES
[address]  [place], [address]       GI PROGRESS NOTE  Will Brie Delong  [phone] office  [phone] NP/PA in-hospital cell phone M-F until 4:30PM  After 5PM or on weekends, please call  for physician on call      NAME: Analisa Cordova   :  1950   MRN:  [id number]       Subjective:   Patient is sitting in the chair and appears comfortable. No nausea, vomiting, or abdominal pain. He is passing flatus. No bowel movement. He is tolerating clears. Objective:     VITALS:   Last 24hrs VS reviewed since prior progress note. Most recent are:  Visit Vitals  /65 (BP 1 Location: Right arm, BP Patient Position: At rest;Supine)   Pulse 60   Temp 98.3 °F (36.8 °C)   Resp 14   SpO2 94%       PHYSICAL EXAM:  General: Cooperative, no acute distress    Neurologic:  Alert and oriented  HEENT: EOMI, no scleral icterus   Lungs:  CTA bilaterally anteriorly  Heart:  S1 S2  Abdomen: Soft, mildly distended, no tenderness, no guarding, no rebound. Hypoactive bowel sounds.   Extremities: Warm  Psych:   Not anxious or agitated    Lab Data Reviewed:     Recent Results (from the past 24 hour(s))   METABOLIC PANEL, BASIC    Collection Time: 19  3:12 AM   Result Value Ref Range    Sodium 139 136 - 145 mmol/L    Potassium 4.0 3.5 - 5.1 mmol/L    Chloride 106 97 - 108 mmol/L    CO2 24 21 - 32 mmol/L    Anion gap 9 5 - 15 mmol/L    Glucose 129 (H) 65 - 100 mg/dL    BUN 27 (H) 6 - 20 MG/DL    Creatinine 1.27 0.70 - 1.30 MG/DL    BUN/Creatinine ratio 21 (H) 12 - 20      GFR est AA >60 >60 ml/min/1.73m2    GFR est non-AA 56 (L) >60 ml/min/1.73m2    Calcium 8.9 8.5 - 10.1 MG/DL   CBC WITH AUTOMATED DIFF    Collection Time: 19  3:12 AM   Result Value Ref Range    WBC 12.2 (H) 4.1 - 11.1 K/uL    RBC 4.65 4.10 - 5.70 M/uL    HGB 13.2 12.1 - 17.0 g/dL    HCT 41.8 36.6 - 50.3 %    MCV 89.9 80.0 - 99.0 FL    MCH 28.4 26.0 - 34.0 PG    MCHC 31.6 30.0 - 36.5 g/dL    RDW 13.9 11.5 - 14.5 %    PLATELET 986 256 - 084 K/uL    MPV 9.8 8.9 - 12.9 FL    NRBC 0.0 0  WBC    ABSOLUTE NRBC 0.00 0.00 - 0.01 K/uL    NEUTROPHILS 90 (H) 32 - 75 %    LYMPHOCYTES 6 (L) 12 - 49 %    MONOCYTES 3 (L) 5 - 13 %    EOSINOPHILS 0 0 - 7 %    BASOPHILS 0 0 - 1 %    IMMATURE GRANULOCYTES 1 (H) 0.0 - 0.5 %    ABS. NEUTROPHILS 11.0 (H) 1.8 - 8.0 K/UL    ABS. LYMPHOCYTES 0.7 (L) 0.8 - 3.5 K/UL    ABS. MONOCYTES 0.4 0.0 - 1.0 K/UL    ABS. EOSINOPHILS 0.0 0.0 - 0.4 K/UL    ABS. BASOPHILS 0.0 0.0 - 0.1 K/UL    ABS. IMM. GRANS. 0.1 (H) 0.00 - 0.04 K/UL    DF SMEAR SCANNED      PLATELET COMMENTS Large Platelets      RBC COMMENTS ANISOCYTOSIS  1+           Assessment:   · Crohn's disease: status post segmental ileal resection in early 1990's, on Asacol. CT abdomen/pelvis without contrast (8/16/19): small bowel obstruction with a zone of transition at the level of the terminal ileum, thick-walled; findings consistent with patient's history of Crohn's disease; additional nonobstructing area of wall thickening involving the distal transverse colon. · Small bowel obstruction     Patient Active Problem List   Diagnosis Code    Hyperlipidemia E78.5    Crohn disease ([address] K50.90    Encounter for long-term (current) drug use Z79.899    COPD (chronic obstructive pulmonary disease) ([address] J44.9    Exacerbation of Crohn's disease ([address] K50.90    CHRISTIAN (acute kidney injury) ([address] N17.9    Small bowel obstruction ([address] K56.609     Plan:   · On clear liquids  · On Asacol  · Continue steroids  · Continue supportive measures  · Surgery following     Signed By: PHILLIP Agrawal     8/19/2019  10:30 AM         GI Attending: Agree with above plan. Continue current medications including steroids. Dr. Romeo Whitley to follow tomorrow. Jagdish Cortes MD

## 2019-08-19 NOTE — PROGRESS NOTES
Abdominal xray reviewed with findings consistent with distal small bowel obstruction. Patient reverted to NPO status. Continue IVFs, empiric antibiotics, stat general surgical follow up. D/W nursing.

## 2019-08-19 NOTE — PROGRESS NOTES
Informed Dr. Herve Del Angel of pt's xray results and Dr. Moira Hodgkins placing pt back on NPO and request for patient be re-evaluated for surgery, per the xray results.

## 2019-08-19 NOTE — PROGRESS NOTES
[facility]    Asked to see patient by Dr. Vianca Mayer for follow up after xray. Patient reports no abdominal pain, only some cramping. No n/v.  He continues to pass flatus. No BM yet. He was tolerating liquids earlier today. AAS shows no signs of perforation or pneumatosis. He does have persistent air fluid levels. This all seems consistent with slowly resolving obstruction from his Crohn's flare. I will leave him NPO as was ordered by Dr. Vianca Mayer since this is the only time I have seen him but he could likely start back on liquids in my opinion as he continues to pass flatus and denies n/v.  He is on steroids for the Crohn's flare. Unlikely that he needs empiric antibiotics without signs of abscess, fistula or infection on CT. Surgery to continue to follow.       Roberto Ordonez MD  8/19/2019  6:49 PM

## 2019-08-20 ENCOUNTER — APPOINTMENT (OUTPATIENT)
Dept: GENERAL RADIOLOGY | Age: 69
DRG: 385 | End: 2019-08-20
Attending: HOSPITALIST
Payer: MEDICARE

## 2019-08-20 LAB
ANION GAP SERPL CALC-SCNC: 10 MMOL/L (ref 5–15)
BASOPHILS # BLD: 0 K/UL (ref 0–0.1)
BASOPHILS NFR BLD: 0 % (ref 0–1)
BUN SERPL-MCNC: 27 MG/DL (ref 6–20)
BUN/CREAT SERPL: 22 (ref 12–20)
CALCIUM SERPL-MCNC: 8.6 MG/DL (ref 8.5–10.1)
CHLORIDE SERPL-SCNC: 108 MMOL/L (ref 97–108)
CO2 SERPL-SCNC: 21 MMOL/L (ref 21–32)
CREAT SERPL-MCNC: 1.21 MG/DL (ref 0.7–1.3)
DIFFERENTIAL METHOD BLD: ABNORMAL
EOSINOPHIL # BLD: 0 K/UL (ref 0–0.4)
EOSINOPHIL NFR BLD: 0 % (ref 0–7)
ERYTHROCYTE [DISTWIDTH] IN BLOOD BY AUTOMATED COUNT: 13.9 % (ref 11.5–14.5)
GLUCOSE SERPL-MCNC: 119 MG/DL (ref 65–100)
HBV SURFACE AG SER QL: 0.95 INDEX
HBV SURFACE AG SER QL: NEGATIVE
HCT VFR BLD AUTO: 41.2 % (ref 36.6–50.3)
HGB BLD-MCNC: 13 G/DL (ref 12.1–17)
IMM GRANULOCYTES # BLD AUTO: 0.1 K/UL (ref 0–0.04)
IMM GRANULOCYTES NFR BLD AUTO: 1 % (ref 0–0.5)
LYMPHOCYTES # BLD: 0.6 K/UL (ref 0.8–3.5)
LYMPHOCYTES NFR BLD: 5 % (ref 12–49)
MAGNESIUM SERPL-MCNC: 2 MG/DL (ref 1.6–2.4)
MCH RBC QN AUTO: 28.3 PG (ref 26–34)
MCHC RBC AUTO-ENTMCNC: 31.6 G/DL (ref 30–36.5)
MCV RBC AUTO: 89.8 FL (ref 80–99)
MONOCYTES # BLD: 0.2 K/UL (ref 0–1)
MONOCYTES NFR BLD: 2 % (ref 5–13)
NEUTS SEG # BLD: 11.4 K/UL (ref 1.8–8)
NEUTS SEG NFR BLD: 92 % (ref 32–75)
NRBC # BLD: 0 K/UL (ref 0–0.01)
NRBC BLD-RTO: 0 PER 100 WBC
PLATELET # BLD AUTO: 194 K/UL (ref 150–400)
PMV BLD AUTO: 9.9 FL (ref 8.9–12.9)
POTASSIUM SERPL-SCNC: 4.1 MMOL/L (ref 3.5–5.1)
RBC # BLD AUTO: 4.59 M/UL (ref 4.1–5.7)
RBC MORPH BLD: ABNORMAL
RBC MORPH BLD: ABNORMAL
SODIUM SERPL-SCNC: 139 MMOL/L (ref 136–145)
WBC # BLD AUTO: 12.3 K/UL (ref 4.1–11.1)

## 2019-08-20 PROCEDURE — 74011000250 HC RX REV CODE- 250: Performed by: INTERNAL MEDICINE

## 2019-08-20 PROCEDURE — 94760 N-INVAS EAR/PLS OXIMETRY 1: CPT

## 2019-08-20 PROCEDURE — 87340 HEPATITIS B SURFACE AG IA: CPT

## 2019-08-20 PROCEDURE — 80048 BASIC METABOLIC PNL TOTAL CA: CPT

## 2019-08-20 PROCEDURE — 86480 TB TEST CELL IMMUN MEASURE: CPT

## 2019-08-20 PROCEDURE — 85025 COMPLETE CBC W/AUTO DIFF WBC: CPT

## 2019-08-20 PROCEDURE — 74011250637 HC RX REV CODE- 250/637: Performed by: HOSPITALIST

## 2019-08-20 PROCEDURE — 36415 COLL VENOUS BLD VENIPUNCTURE: CPT

## 2019-08-20 PROCEDURE — 74011250637 HC RX REV CODE- 250/637: Performed by: INTERNAL MEDICINE

## 2019-08-20 PROCEDURE — 74011250636 HC RX REV CODE- 250/636: Performed by: INTERNAL MEDICINE

## 2019-08-20 PROCEDURE — 83735 ASSAY OF MAGNESIUM: CPT

## 2019-08-20 PROCEDURE — C9113 INJ PANTOPRAZOLE SODIUM, VIA: HCPCS | Performed by: INTERNAL MEDICINE

## 2019-08-20 PROCEDURE — 65270000029 HC RM PRIVATE

## 2019-08-20 RX ORDER — LANOLIN ALCOHOL/MO/W.PET/CERES
3 CREAM (GRAM) TOPICAL
Status: DISCONTINUED | OUTPATIENT
Start: 2019-08-20 | End: 2019-08-23 | Stop reason: HOSPADM

## 2019-08-20 RX ADMIN — METHYLPREDNISOLONE SODIUM SUCCINATE 40 MG: 125 INJECTION, POWDER, FOR SOLUTION INTRAMUSCULAR; INTRAVENOUS at 13:31

## 2019-08-20 RX ADMIN — HEPARIN SODIUM 5000 UNITS: 5000 INJECTION INTRAVENOUS; SUBCUTANEOUS at 08:53

## 2019-08-20 RX ADMIN — BUPROPION HYDROCHLORIDE 150 MG: 150 TABLET, EXTENDED RELEASE ORAL at 22:03

## 2019-08-20 RX ADMIN — Medication 10 ML: at 13:33

## 2019-08-20 RX ADMIN — METHYLPREDNISOLONE SODIUM SUCCINATE 40 MG: 125 INJECTION, POWDER, FOR SOLUTION INTRAMUSCULAR; INTRAVENOUS at 22:04

## 2019-08-20 RX ADMIN — LEVOFLOXACIN 750 MG: 5 INJECTION, SOLUTION INTRAVENOUS at 00:23

## 2019-08-20 RX ADMIN — SIMVASTATIN 40 MG: 40 TABLET, FILM COATED ORAL at 22:04

## 2019-08-20 RX ADMIN — MESALAMINE 800 MG: 800 TABLET, DELAYED RELEASE ORAL at 22:04

## 2019-08-20 RX ADMIN — METRONIDAZOLE 500 MG: 500 INJECTION, SOLUTION INTRAVENOUS at 13:31

## 2019-08-20 RX ADMIN — FOLIC ACID TAB 400 MCG 0.8 MG: 400 TAB at 08:56

## 2019-08-20 RX ADMIN — Medication 10 ML: at 22:09

## 2019-08-20 RX ADMIN — MESALAMINE 800 MG: 800 TABLET, DELAYED RELEASE ORAL at 16:14

## 2019-08-20 RX ADMIN — THERA TABS 1 TABLET: TAB at 08:55

## 2019-08-20 RX ADMIN — Medication 3 MG: at 22:03

## 2019-08-20 RX ADMIN — FLUOXETINE 20 MG: 20 CAPSULE ORAL at 08:56

## 2019-08-20 RX ADMIN — Medication 10 ML: at 06:15

## 2019-08-20 RX ADMIN — SODIUM CHLORIDE 40 MG: 9 INJECTION INTRAMUSCULAR; INTRAVENOUS; SUBCUTANEOUS at 08:53

## 2019-08-20 RX ADMIN — METRONIDAZOLE 500 MG: 500 INJECTION, SOLUTION INTRAVENOUS at 02:07

## 2019-08-20 RX ADMIN — MESALAMINE 800 MG: 800 TABLET, DELAYED RELEASE ORAL at 08:56

## 2019-08-20 RX ADMIN — HEPARIN SODIUM 5000 UNITS: 5000 INJECTION INTRAVENOUS; SUBCUTANEOUS at 16:15

## 2019-08-20 RX ADMIN — BUPROPION HYDROCHLORIDE 150 MG: 150 TABLET, EXTENDED RELEASE ORAL at 08:56

## 2019-08-20 RX ADMIN — SODIUM CHLORIDE, SODIUM LACTATE, POTASSIUM CHLORIDE, AND CALCIUM CHLORIDE 125 ML/HR: 600; 310; 30; 20 INJECTION, SOLUTION INTRAVENOUS at 06:16

## 2019-08-20 RX ADMIN — METHYLPREDNISOLONE SODIUM SUCCINATE 40 MG: 125 INJECTION, POWDER, FOR SOLUTION INTRAMUSCULAR; INTRAVENOUS at 06:15

## 2019-08-20 NOTE — PROGRESS NOTES
[address]  174 [facility], [address]       GI PROGRESS NOTE  Krystalpricila Hoover, [facility] office  [phone] NP/PA in-hospital cell phone M-F until 4:30PM  After 5PM or on weekends, please call  for physician on call      NAME: Rolando Carvajal   :  1950   MRN:  [id number]       Subjective:   KUB yesterday afternoon with distal SBO, put back NPO - he denies any worsening of abdominal pain yesterday. He denies abdominal pain currently, no nausea/vomiting, +flatus, no bowel movements. Objective:     VITALS:   Last 24hrs VS reviewed since prior progress note. Most recent are:  Visit Vitals  /80 (BP 1 Location: Right arm, BP Patient Position: At rest)   Pulse (!) 50   Temp 97.5 °F (36.4 °C)   Resp 16   Wt 84.4 kg (186 lb 1.1 oz)   SpO2 95%   BMI 25.24 kg/m²       PHYSICAL EXAM:  General: Cooperative, no acute distress    Neurologic:  Alert and oriented  HEENT: EOMI, no scleral icterus   Lungs:  CTA bilaterally anteriorly  Heart:  S1 S2  Abdomen: Soft, mildly distended, no tenderness, no guarding, no rebound. Hypoactive bowel sounds. Extremities: Warm  Psych:   Not anxious or agitated    Lab Data Reviewed:     Recent Results (from the past 24 hour(s))   METABOLIC PANEL, BASIC    Collection Time: 19  2:19 AM   Result Value Ref Range    Sodium 139 136 - 145 mmol/L    Potassium 4.1 3.5 - 5.1 mmol/L    Chloride 108 97 - 108 mmol/L    CO2 21 21 - 32 mmol/L    Anion gap 10 5 - 15 mmol/L    Glucose 119 (H) 65 - 100 mg/dL    BUN 27 (H) 6 - 20 MG/DL    Creatinine 1.21 0.70 - 1.30 MG/DL    BUN/Creatinine ratio 22 (H) 12 - 20      GFR est AA >60 >60 ml/min/1.73m2    GFR est non-AA 59 (L) >60 ml/min/1.73m2    Calcium 8.6 8.5 - 10.1 MG/DL   CBC WITH AUTOMATED DIFF    Collection Time: 19  2:19 AM   Result Value Ref Range    WBC 12.3 (H) 4.1 - 11.1 K/uL    RBC 4.59 4. 10 - 5.70 M/uL    HGB 13.0 12.1 - 17.0 g/dL    HCT 41.2 36.6 - 50.3 %    MCV 89.8 80.0 - 99.0 FL    MCH 28.3 26.0 - 34.0 PG    MCHC 31.6 30.0 - 36.5 g/dL    RDW 13.9 11.5 - 14.5 %    PLATELET 060 187 - 809 K/uL    MPV 9.9 8.9 - 12.9 FL    NRBC 0.0 0  WBC    ABSOLUTE NRBC 0.00 0.00 - 0.01 K/uL    NEUTROPHILS 92 (H) 32 - 75 %    LYMPHOCYTES 5 (L) 12 - 49 %    MONOCYTES 2 (L) 5 - 13 %    EOSINOPHILS 0 0 - 7 %    BASOPHILS 0 0 - 1 %    IMMATURE GRANULOCYTES 1 (H) 0.0 - 0.5 %    ABS. NEUTROPHILS 11.4 (H) 1.8 - 8.0 K/UL    ABS. LYMPHOCYTES 0.6 (L) 0.8 - 3.5 K/UL    ABS. MONOCYTES 0.2 0.0 - 1.0 K/UL    ABS. EOSINOPHILS 0.0 0.0 - 0.4 K/UL    ABS. BASOPHILS 0.0 0.0 - 0.1 K/UL    ABS. IMM. GRANS. 0.1 (H) 0.00 - 0.04 K/UL    DF SMEAR SCANNED      RBC COMMENTS OVALOCYTES  1+        RBC COMMENTS ANISOCYTOSIS  1+         IMPRESSION: Diffuse small bowel distention with air-fluid levels, compatible  with distal small bowel obstruction. Assessment:   · Crohn's disease: status post segmental ileal resection in early 1990's, on Asacol. CT abdomen/pelvis without contrast (8/16/19): small bowel obstruction with a zone of transition at the level of the terminal ileum, thick-walled; findings consistent with patient's history of Crohn's disease; additional nonobstructing area of wall thickening involving the distal transverse colon. · Small bowel obstruction     Patient Active Problem List   Diagnosis Code    Hyperlipidemia E78.5    Crohn disease ([address] K50.90    Encounter for long-term (current) drug use Z79.899    COPD (chronic obstructive pulmonary disease) ([address] J44.9    Exacerbation of Crohn's disease ([address] K50.90    CHRISTIAN (acute kidney injury) ([address] N17.9    Small bowel obstruction ([address] K56.609     Plan:   · CLD  · Mesalamine   · Continue steroids  · Continue supportive measures  · Surgery following     Signed By: Chester Cunningham NP     8/20/2019  10:30 AM   This patient was seen and examined by me in a face-to-face visit today. I reviewed the medical record including lab work, imaging and other provider notes. I confirmed the interval history as described above. I spoke to the patient, however the patient is unable to give a meaningful history. I discussed this case in detail with Melani Anderson NP. I formulated an updated  assessment of this patient and guided our treatment plan. I agree with the above progress note. I agree with the history, exam and assessment and plan as outlined in the note. I would like to add the following: He has been very stable for years. Now clearly has distal SBO secondary to active Crohn's. He is passing gas but no bowel movements. He denies abdominal pain and exam is benign. I agree with plans to continue IV steroids for now.

## 2019-08-20 NOTE — PROGRESS NOTES
Hospitalist Progress Note                               Shaunna Gore MD                                     Answering service: [phone] or [phone] from in house phone                                         Date of Service:  2019  NAME:  Scarlet Benitez  :  1950  MRN:  [id number]      Admission Summary:      71 Y/O gentlemanwith a past medical history significant for Crohn's disease, dyslipidemia, COPD, and  depression, was in his usual state of health until the day prior to presentation when the patient developed abdominal pain which got progressively worse. The pain was located in the epigastric region,constant, sharp in nature, 7/10 in severity. No radiation. No known aggravating or relieving factors. The patient has Crohn's disease, with small bowel obstruction as a result of the Crohn's disease which was resected. Due to the worsening symptoms, the patient presented to the emergency room for further evaluation. No fevers, no rigors, no chills. No record of prior admission to this hospital.  When the patient arrived at the emergency room, CT scan of the abdomen and pelvis showed small bowel obstruction. The emergency room physician consulted general surgeon on-call; conservative therapy was advised. The patient was then referred to the hospitalist service for evaluation for admission. Reason for follow up:       CC: abdominal pain. Patient denied any nausea/vomiting,abdominal pain. Did not have bowel movement but passing flatus/gas. Complained of calf pain when walking. Assessment & Plan:   #Small bowel obstruction:  -due to active crohn's disease, CT abdomen -Small bowel obstruction with a zone of transition at the level of the terminal ileum, which is thick-walled.  Findings are consistent with the  patient's history of Crohn's disease  -Gen surg following  -started clear liquid diet today  -continue IVF  -Prn roxicodone for pain. #Crohn's disease exacerbation:  -h/o ileal resection in 1990s. -On mesalamine.  -on IV steroids now. -GI following.  -Had colonoscopy recently which showed active severe crohn's disease  -continue mesalamine.  -was started on empiric levaquin and flagyl. #LE cramps/pain:  -will get venous dopplers to rule out DVT  -d/c levaquin as it can cause myopathy  -K and mag wnl today      #CHRISTIAN -resolved:  -on IVF    #Depression: continue home meds    #Hyperlipidemia: on statin    #Sinus bradycardia: assymptomatic. Monitor for now       Plan: Continue conservative management. Discussed plan with patient and Nursing. He is DNR/DNI  DVT prophylaxis: heparin    Hospital Problems  Date Reviewed: 8/17/2019          Codes Class Noted POA    [facility]STIAN (acute kidney [facility]) ([facility] ICD-10-CM: N17.9  ICD-9-CM: 584.9  8/17/2019 Yes        Small bowel obstruction ([facility] ICD-10-CM: M07.580  ICD-9-CM: 560.9  8/17/2019 Yes        * (Principal) Exacerbation of Crohn's disease ([facility] ICD-10-CM: K50.90  ICD-9-CM: 555.9  8/16/2019 Yes              Physical Examination:      Last 24hrs VS reviewed since prior progress note. Most recent are:  Visit Vitals  /72 (BP 1 Location: Right arm, BP Patient Position: At rest)   Pulse (!) 54   Temp 97.7 °F (36.5 °C)   Resp 16   Wt 84.4 kg (186 lb 1.1 oz)   SpO2 97%   BMI 25.24 kg/m²           Constitutional:  No acute distress. HEENT: Head is a traumatic,  Un icteric sclera. Pink conjunctiva,no erythema or discharge. Oral mucous moist, oropharynx benign. Neck supple,    Resp:  B/L air entry. CV:  Regular rhythm, normal rate, no murmurs, gallops, rubs    GI:  Soft,non  distended, mild generalized tenderness on deep palpation,normoactive bowel sounds, no hepatosplenomegaly    :  No CVA or suprapubic tenderness   Skin  :  Intact    Musculoskeletal:  No edema, warm, 2+ pulses throughout  CNS: Awake, alert and oriented. No focal deficits          No intake or output data in the 24 hours ending 08/20/19 1706         Labs:     Recent Labs     08/20/19 0219 08/19/19  0312   WBC 12.3* 12.2*   HGB 13.0 13.2   HCT 41.2 41.8    186     Recent Labs     08/20/19  0219 08/19/19  0312 08/18/19  0520    139 140   K 4.1 4.0 4.5    106 108   CO2 21 24 25   BUN 27* 27* 23*   CREA 1.21 1.27 1.32*   * 129* 134*   CA 8.6 8.9 8.6   MG 2.0  --   --      No results for input(s): SGOT, GPT, ALT, AP, TBIL, TBILI, TP, ALB, GLOB, GGT, AML, LPSE in the last 72 hours. No lab exists for component: AMYP, HLPSE  No results for input(s): INR, PTP, APTT in the last 72 hours. No lab exists for component: INREXT, INREXT   No results for input(s): FE, TIBC, PSAT, FERR in the last 72 hours. No results found for: FOL, RBCF   No results for input(s): PH, PCO2, PO2 in the last 72 hours. No results for input(s): CPK, CKNDX, TROIQ in the last 72 hours.     No lab exists for component: CPKMB  Lab Results   Component Value Date/Time    Cholesterol, total 154 08/17/2019 05:06 AM    HDL Cholesterol 51 08/17/2019 05:06 AM    LDL, calculated 59.6 08/17/2019 05:06 AM    Triglyceride 217 (H) 08/17/2019 05:06 AM    CHOL/HDL Ratio 3.0 08/17/2019 05:06 AM     No results found for: GLUCPOC  Lab Results   Component Value Date/Time    Color DARK YELLOW 08/17/2019 05:06 AM    Appearance CLOUDY (A) 08/17/2019 05:06 AM    Specific gravity 1.025 08/17/2019 05:06 AM    pH (UA) 5.0 08/17/2019 05:06 AM    Protein NEGATIVE  08/17/2019 05:06 AM    Glucose NEGATIVE  08/17/2019 05:06 AM    Ketone TRACE (A) 08/17/2019 05:06 AM    Bilirubin Negative 10/01/2012 09:00 AM    Urobilinogen 0.2 08/17/2019 05:06 AM    Nitrites NEGATIVE  08/17/2019 05:06 AM    Leukocyte Esterase NEGATIVE  08/17/2019 05:06 AM    Epithelial cells FEW 08/17/2019 05:06 AM    Bacteria 1+ (A) 08/17/2019 05:06 AM    WBC 0-4 08/17/2019 05:06 AM    RBC 0-5 08/17/2019 05:06 AM         Medications Reviewed:     Current Facility-Administered Medications   Medication Dose Route Frequency    melatonin tablet 3 mg  3 mg Oral QHS    buPROPion SR (WELLBUTRIN SR) tablet 150 mg  150 mg Oral BID    calcium carbonate (TUMS) chewable tablet 400 mg [elemental]  400 mg Oral QID PRN    methylPREDNISolone (PF) (Solu-MEDROL) injection 40 mg  40 mg IntraVENous Q8H    pantoprazole (PROTONIX) 40 mg in sodium chloride 0.9% 10 mL injection  40 mg IntraVENous DAILY    FLUoxetine (PROzac) capsule 20 mg  20 mg Oral DAILY    folic acid tablet 0.8 mg  800 mcg Oral DAILY    mesalamine DR (ASACOL HD) tablet 800 mg  800 mg Oral TID    simvastatin (ZOCOR) tablet 40 mg  40 mg Oral QHS    therapeutic multivitamin (THERAGRAN) tablet 1 Tab  1 Tab Oral DAILY    sodium chloride (NS) flush 5-40 mL  5-40 mL IntraVENous Q8H    sodium chloride (NS) flush 5-40 mL  5-40 mL IntraVENous PRN    acetaminophen (TYLENOL) tablet 650 mg  650 mg Oral Q4H PRN    HYDROcodone-acetaminophen (NORCO) 5-325 mg per tablet 1 Tab  1 Tab Oral Q4H PRN    HYDROmorphone (PF) (DILAUDID) injection 1 mg  1 mg IntraVENous Q4H PRN    heparin (porcine) injection 5,000 Units  5,000 Units SubCUTAneous Q8H    lactated Ringers infusion  125 mL/hr IntraVENous CONTINUOUS    ondansetron (ZOFRAN) injection 4 mg  4 mg IntraVENous Q4H PRN    metroNIDAZOLE (FLAGYL) IVPB premix 500 mg  500 mg IntraVENous Q12H    levoFLOXacin (LEVAQUIN) 750 mg in D5W IVPB  750 mg IntraVENous Q24H    albuterol-ipratropium (DUO-NEB) 2.5 MG-0.5 MG/3 ML  3 mL Nebulization Q4H PRN     ______________________________________________________________________  EXPECTED LENGTH OF STAY: 5d 7h  ACTUAL LENGTH OF STAY:          4                 Dieudonne Recio MD

## 2019-08-20 NOTE — PROGRESS NOTES
Spiritual Care Partner Volunteer visited patient in room 532/01 on 8.20. 19. Documented by: : Rev. Jaleesa Friedman.  Ajay Diaz; [facility], to contact [phone] [address] call: [phone]

## 2019-08-20 NOTE — PROGRESS NOTES
Progress Note    Patient: Erasmo Melendez MRN: [id number]  SSN: [id number]    YOB: 1950  Age: 70 y.o. Sex: male      Admit Date: 2019    * No surgery found *    Procedure:      Subjective:     Patient feeling good today. Passing gas , No flatus. Objective:     Visit Vitals  /80 (BP 1 Location: Right arm, BP Patient Position: At rest)   Pulse (!) 50   Temp 97.5 °F (36.4 °C)   Resp 16   Wt 186 lb 1.1 oz (84.4 kg)   SpO2 95%   BMI 25.24 kg/m²       Temp (24hrs), Av.9 °F (36.6 °C), Min:97.5 °F (36.4 °C), Max:98.6 °F (37 °C)      Physical Exam:    Gen-Alert in NAD  ABd- S/nt/nd    Data Review: images and reports reviewed    Lab Review: All lab results for the last 24 hours reviewed. Recent Results (from the past 24 hour(s))   METABOLIC PANEL, BASIC    Collection Time: 19  2:19 AM   Result Value Ref Range    Sodium 139 136 - 145 mmol/L    Potassium 4.1 3.5 - 5.1 mmol/L    Chloride 108 97 - 108 mmol/L    CO2 21 21 - 32 mmol/L    Anion gap 10 5 - 15 mmol/L    Glucose 119 (H) 65 - 100 mg/dL    BUN 27 (H) 6 - 20 MG/DL    Creatinine 1.21 0.70 - 1.30 MG/DL    BUN/Creatinine ratio 22 (H) 12 - 20      GFR est AA >60 >60 ml/min/1.73m2    GFR est non-AA 59 (L) >60 ml/min/1.73m2    Calcium 8.6 8.5 - 10.1 MG/DL   CBC WITH AUTOMATED DIFF    Collection Time: 19  2:19 AM   Result Value Ref Range    WBC 12.3 (H) 4.1 - 11.1 K/uL    RBC 4.59 4. 10 - 5.70 M/uL    HGB 13.0 12.1 - 17.0 g/dL    HCT 41.2 36.6 - 50.3 %    MCV 89.8 80.0 - 99.0 FL    MCH 28.3 26.0 - 34.0 PG    MCHC 31.6 30.0 - 36.5 g/dL    RDW 13.9 11.5 - 14.5 %    PLATELET 074 653 - 418 K/uL    MPV 9.9 8.9 - 12.9 FL    NRBC 0.0 0  WBC    ABSOLUTE NRBC 0.00 0.00 - 0.01 K/uL    NEUTROPHILS 92 (H) 32 - 75 %    LYMPHOCYTES 5 (L) 12 - 49 %    MONOCYTES 2 (L) 5 - 13 %    EOSINOPHILS 0 0 - 7 %    BASOPHILS 0 0 - 1 %    IMMATURE GRANULOCYTES 1 (H) 0.0 - 0.5 %    ABS. NEUTROPHILS 11.4 (H) 1.8 - 8.0 K/UL    ABS.  LYMPHOCYTES 0.6 (L) 0.8 - 3.5 K/UL    ABS. MONOCYTES 0.2 0.0 - 1.0 K/UL    ABS. EOSINOPHILS 0.0 0.0 - 0.4 K/UL    ABS. BASOPHILS 0.0 0.0 - 0.1 K/UL    ABS. IMM. GRANS. 0.1 (H) 0.00 - 0.04 K/UL    DF SMEAR SCANNED      RBC COMMENTS OVALOCYTES  1+        RBC COMMENTS ANISOCYTOSIS  1+       MAGNESIUM    Collection Time: 08/20/19  2:19 AM   Result Value Ref Range    Magnesium 2.0 1.6 - 2.4 mg/dL       Assessment:     Hospital Problems  Date Reviewed: 8/17/2019          Codes Class Noted POA    CHRISTIAN (acute kidney injury) ([facility] ICD-10-CM: N17.9  ICD-9-CM: 584.9  8/17/2019 Yes        Small bowel obstruction ([facility] ICD-10-CM: I48.167  ICD-9-CM: 560.9  8/17/2019 Yes        * (Principal) Exacerbation of Crohn's disease ([facility] ICD-10-CM: K50.90  ICD-9-CM: 555.9  8/16/2019 Yes              Plan/Recommendations/Medical Decision Making:   Doing well   Will continue with clears for now.    Continue steroids for crohn's

## 2019-08-20 NOTE — PROGRESS NOTES
Pt's wife came to nurses station and stated that her 's calf is painful when ambulates. This writer went to room and assessed his calves both are swollen  and tender to touch. Notified MD order received.

## 2019-08-21 ENCOUNTER — APPOINTMENT (OUTPATIENT)
Dept: VASCULAR SURGERY | Age: 69
DRG: 385 | End: 2019-08-21
Attending: HOSPITALIST
Payer: MEDICARE

## 2019-08-21 PROCEDURE — 74011000250 HC RX REV CODE- 250: Performed by: INTERNAL MEDICINE

## 2019-08-21 PROCEDURE — 93970 EXTREMITY STUDY: CPT

## 2019-08-21 PROCEDURE — C9113 INJ PANTOPRAZOLE SODIUM, VIA: HCPCS | Performed by: INTERNAL MEDICINE

## 2019-08-21 PROCEDURE — 74011250637 HC RX REV CODE- 250/637: Performed by: INTERNAL MEDICINE

## 2019-08-21 PROCEDURE — 94760 N-INVAS EAR/PLS OXIMETRY 1: CPT

## 2019-08-21 PROCEDURE — 74011250636 HC RX REV CODE- 250/636: Performed by: HOSPITALIST

## 2019-08-21 PROCEDURE — 74011250637 HC RX REV CODE- 250/637: Performed by: HOSPITALIST

## 2019-08-21 PROCEDURE — 77030012893

## 2019-08-21 PROCEDURE — 74011250636 HC RX REV CODE- 250/636: Performed by: INTERNAL MEDICINE

## 2019-08-21 PROCEDURE — 65270000029 HC RM PRIVATE

## 2019-08-21 RX ORDER — HEPARIN SODIUM 5000 [USP'U]/ML
5000 INJECTION, SOLUTION INTRAVENOUS; SUBCUTANEOUS EVERY 12 HOURS
Status: DISCONTINUED | OUTPATIENT
Start: 2019-08-22 | End: 2019-08-23 | Stop reason: HOSPADM

## 2019-08-21 RX ADMIN — SODIUM CHLORIDE, SODIUM LACTATE, POTASSIUM CHLORIDE, AND CALCIUM CHLORIDE 50 ML/HR: 600; 310; 30; 20 INJECTION, SOLUTION INTRAVENOUS at 00:36

## 2019-08-21 RX ADMIN — FOLIC ACID TAB 400 MCG 0.8 MG: 400 TAB at 09:44

## 2019-08-21 RX ADMIN — MESALAMINE 800 MG: 800 TABLET, DELAYED RELEASE ORAL at 21:32

## 2019-08-21 RX ADMIN — SODIUM CHLORIDE 40 MG: 9 INJECTION INTRAMUSCULAR; INTRAVENOUS; SUBCUTANEOUS at 09:44

## 2019-08-21 RX ADMIN — METRONIDAZOLE 500 MG: 500 INJECTION, SOLUTION INTRAVENOUS at 03:00

## 2019-08-21 RX ADMIN — METHYLPREDNISOLONE SODIUM SUCCINATE 40 MG: 125 INJECTION, POWDER, FOR SOLUTION INTRAMUSCULAR; INTRAVENOUS at 07:01

## 2019-08-21 RX ADMIN — HEPARIN SODIUM 5000 UNITS: 5000 INJECTION INTRAVENOUS; SUBCUTANEOUS at 17:20

## 2019-08-21 RX ADMIN — MESALAMINE 800 MG: 800 TABLET, DELAYED RELEASE ORAL at 09:44

## 2019-08-21 RX ADMIN — BUPROPION HYDROCHLORIDE 150 MG: 150 TABLET, EXTENDED RELEASE ORAL at 09:44

## 2019-08-21 RX ADMIN — THERA TABS 1 TABLET: TAB at 09:44

## 2019-08-21 RX ADMIN — HEPARIN SODIUM 5000 UNITS: 5000 INJECTION INTRAVENOUS; SUBCUTANEOUS at 07:01

## 2019-08-21 RX ADMIN — METHYLPREDNISOLONE SODIUM SUCCINATE 40 MG: 125 INJECTION, POWDER, FOR SOLUTION INTRAMUSCULAR; INTRAVENOUS at 14:22

## 2019-08-21 RX ADMIN — Medication 10 ML: at 21:33

## 2019-08-21 RX ADMIN — FLUOXETINE 20 MG: 20 CAPSULE ORAL at 09:46

## 2019-08-21 RX ADMIN — METHYLPREDNISOLONE SODIUM SUCCINATE 40 MG: 125 INJECTION, POWDER, FOR SOLUTION INTRAMUSCULAR; INTRAVENOUS at 21:32

## 2019-08-21 RX ADMIN — Medication 10 ML: at 14:23

## 2019-08-21 RX ADMIN — METRONIDAZOLE 500 MG: 500 INJECTION, SOLUTION INTRAVENOUS at 14:23

## 2019-08-21 RX ADMIN — BUPROPION HYDROCHLORIDE 150 MG: 150 TABLET, EXTENDED RELEASE ORAL at 21:32

## 2019-08-21 RX ADMIN — Medication 3 MG: at 21:32

## 2019-08-21 RX ADMIN — MESALAMINE 800 MG: 800 TABLET, DELAYED RELEASE ORAL at 17:20

## 2019-08-21 RX ADMIN — SIMVASTATIN 40 MG: 40 TABLET, FILM COATED ORAL at 21:32

## 2019-08-21 RX ADMIN — Medication 10 ML: at 07:02

## 2019-08-21 RX ADMIN — HEPARIN SODIUM 5000 UNITS: 5000 INJECTION INTRAVENOUS; SUBCUTANEOUS at 00:36

## 2019-08-21 NOTE — PROGRESS NOTES
Problem: Pain  Goal: *Control of Pain  Outcome: Progressing Towards Goal     Problem: Falls - Risk of  Goal: *Absence of Falls  Description  Document Estefani Vital Fall Risk and appropriate interventions in the flowsheet.   Outcome: Progressing Towards Goal  Note:   Fall Risk Interventions:  Mobility Interventions: Communicate number of staff needed for ambulation/transfer         Medication Interventions: Evaluate medications/consider consulting pharmacy, Patient to call before getting OOB, Teach patient to arise slowly                   Problem: General Medical Care Plan  Goal: *Vital signs within specified parameters  Outcome: Progressing Towards Goal  Goal: *Absence of infection signs and symptoms  Outcome: Progressing Towards Goal  Goal: *Fluid volume balance  Outcome: Progressing Towards Goal

## 2019-08-21 NOTE — PROGRESS NOTES
Updated Pt that Duplex study will be done 8/21. Report [facility] to [facility] using SBAR . Reviewed Kardex ,MAR, I&o and plan of care.

## 2019-08-21 NOTE — PROGRESS NOTES
[address]  174 [facility], [address]       GI PROGRESS NOTE  Chrissy Cohen, [address] office  [phone] NP/PA in-hospital cell phone M-F until 4:30PM  After 5PM or on weekends, please call  for physician on call      NAME: Dl Juarez   :  1950   MRN:  [id number]       Subjective:   No abdominal pain or nausea. +flatus, no bowel movement     Objective:     VITALS:   Last 24hrs VS reviewed since prior progress note. Most recent are:  Visit Vitals  /65 (BP 1 Location: Right arm)   Pulse 64   Temp 97.7 °F (36.5 °C)   Resp 16   Wt 84.4 kg (186 lb 1.1 oz)   SpO2 96%   BMI 25.24 kg/m²       PHYSICAL EXAM:  General: Cooperative, no acute distress    Neurologic:  Alert and oriented  HEENT: EOMI, no scleral icterus   Lungs:  CTA bilaterally anteriorly  Heart:  S1 S2  Abdomen: Soft, mildly distended, no tenderness, no guarding, no rebound. Hypoactive bowel sounds. Extremities: Warm  Psych:   Not anxious or agitated    Lab Data Reviewed:     Recent Results (from the past 24 hour(s))   HEP B SURFACE AG    Collection Time: 19  3:12 PM   Result Value Ref Range    Hepatitis B surface Ag 0.95 Index    Hep B surface Ag Interp. NEGATIVE  NEG       IMPRESSION: Diffuse small bowel distention with air-fluid levels, compatible  with distal small bowel obstruction. Assessment:   · Crohn's disease: status post segmental ileal resection in early , on Asacol. CT abdomen/pelvis without contrast (19): small bowel obstruction with a zone of transition at the level of the terminal ileum, thick-walled; findings consistent with patient's history of Crohn's disease; additional nonobstructing area of wall thickening involving the distal transverse colon.  TB and hep B sent for possible biologic therapy   · Small bowel obstruction     Patient Active Problem List   Diagnosis Code    Hyperlipidemia E78.5    Crohn disease ([facility] K50.90    Encounter for long-term (current) drug use Z79.899    COPD (chronic obstructive pulmonary disease) ([facility] J44.9    Exacerbation of Crohn's disease ([facility] K50.90    CHRISTIAN (acute kidney injury) ([facility] N17.9    [facility] K56.609     Plan:   · Trial diet advance    · Mesalamine   · Continue steroids  · Continue supportive measures  · Surgery following     Signed By: Ernesto Kowalski NP     8/21/2019  10:30 AM    This patient was seen and examined by me in a face-to-face visit today. I reviewed the medical record including lab work, imaging and other provider notes. I confirmed the interval history as described above. I spoke to the patient, discussing our findings and plans. I discussed this case in detail with Angella Fermin NP I formulated an updated  assessment of this patient and guided our treatment plan. I agree with the above progress note. I agree with the history, exam and assessment and plan as outlined in the note. I would like to add the following: He feels well. Passing gas, but no bowel movement yet. Hopefully we can get him through this hospitalization, so we can start biologics. TB and Hep B studies ordered. Agree with slowly advancing his diet.

## 2019-08-21 NOTE — PROGRESS NOTES
Progress Note    Patient: Jerome Simmonds MRN: [id number]  SSN: [id number]    YOB: 1950  Age: 70 y.o. Sex: male      Admit Date: 2019    * No surgery found *    Procedure:      Subjective:     Patient without complaints. Just seen by GI. Objective:     Visit Vitals  /65 (BP 1 Location: Right arm)   Pulse 64   Temp 97.7 °F (36.5 °C)   Resp 16   Wt 186 lb 1.1 oz (84.4 kg)   SpO2 96%   BMI 25.24 kg/m²       Temp (24hrs), Av.9 °F (36.6 °C), Min:97.7 °F (36.5 °C), Max:98.3 °F (36.8 °C)      Physical Exam:    Deferred as he was undergoing dupex    Data Review: images and reports reviewed    Lab Review: All lab results for the last 24 hours reviewed. Recent Results (from the past 24 hour(s))   HEP B SURFACE AG    Collection Time: 19  3:12 PM   Result Value Ref Range    Hepatitis B surface Ag 0.95 Index    Hep B surface Ag Interp. NEGATIVE  NEG         Assessment:     Hospital Problems  Date Reviewed: 2019          Codes Class Noted POA    [facility]STIAN (acute kidney injury) ([facility] ICD-10-CM: N17.9  ICD-9-CM: 584.9  2019 Yes        Small bowel obstruction ([facility] ICD-10-CM: T29.402  ICD-9-CM: 560.9  2019 Yes        * (Principal) Exacerbation of Crohn's disease ([facility] ICD-10-CM: K50.90  ICD-9-CM: 555.9  2019 Yes              Plan/Recommendations/Medical Decision Making:   SBO secondary to crohn's  No role for surgery at this point. Will defer management to GI-   If this does not improve and may need surgery feel free to reconsult.

## 2019-08-21 NOTE — PROGRESS NOTES
Hospitalist Progress Note                               Shaista Aguero MD                                     Answering service: [phone]                               OR [phone] from in house phone                                         Date of Service:  2019  NAME:  Samanta Carvajal  :  1950  MRN:  [id number]      Admission Summary:      69 Y/O gentlemanwith a past medical history significant for Crohn's disease, dyslipidemia, COPD, and  depression, was in his usual state of health until the day prior to presentation when the patient developed abdominal pain which got progressively worse. The pain was located in the epigastric region,constant, sharp in nature, 7/10 in severity. No radiation. No known aggravating or relieving factors. The patient has Crohn's disease, with small bowel obstruction as a result of the Crohn's disease which was resected. Due to the worsening symptoms, the patient presented to the emergency room for further evaluation. No fevers, no rigors, no chills. No record of prior admission to this hospital.  When the patient arrived at the emergency room, CT scan of the abdomen and pelvis showed small bowel obstruction. The emergency room physician consulted general surgeon on-call; conservative therapy was advised. The patient was then referred to the hospitalist service for evaluation for admission. Reason for follow up:       CC: abdominal pain. Patient denied any nausea/vomiting,abdominal pain. Did not have bowel movement but passing flatus/gas. Still has some calf pain with walking. Discussed code status again with the patient - he said he changed his mind and elected to be full code. His wife is mPOA. Assessment & Plan:   #Small bowel obstruction:improving  -due to active crohn's disease, CT abdomen -Small bowel obstruction with a zone of transition at the level of the terminal ileum, which is thick-walled. Findings are consistent with the  patient's history of Crohn's disease  -Gen surg following  -Diet advanced to full liquid diet today  -continue IVF  -Prn roxicodone for pain. #Crohn's disease exacerbation:  -h/o ileal resection in 1990s. -On mesalamine.  -on IV steroids now. -GI following.  -Had colonoscopy recently which showed active severe crohn's disease  -continue mesalamine.  -was started on empiric levaquin and flagyl -d/c abx and monitor. White count elevation due to steroids    #LE cramps/pain:  - venous dopplers negative for  DVT  -d/c levaquin as it can cause myopathy  -It could also be due to steroids -monitor for now      #CHRISTIAN -resolved:  -on IVF    #Depression: continue home meds    #Hyperlipidemia: on statin    #Sinus bradycardia: assymptomatic. Monitor for now       Plan: Continue conservative management. Discussed plan with patient and Nursing. He is DNR/DNI  DVT prophylaxis: heparin    Dispo; d/c 2-3 days when he tolerates diet    Hospital Problems  Date Reviewed: 8/17/2019          Codes Class Noted POA    CHRISTIAN (acute kidney injury) ([facility] [address] ICD-10-CM: N17.9  ICD-9-CM: 584.9  8/17/2019 Yes        Small bowel obstruction ([facility] [address] ICD-10-CM: Q51.758  ICD-9-CM: 560.9  8/17/2019 Yes        * (Principal) Exacerbation of Crohn's disease ([facility] [address] ICD-10-CM: K50.90  ICD-9-CM: 555.9  8/16/2019 Yes              Physical Examination:      Last 24hrs VS reviewed since prior progress note. Most recent are:  Visit Vitals  /68 (BP 1 Location: Right arm, BP Patient Position: At rest;Sitting)   Pulse 65   Temp 97.8 °F (36.6 °C)   Resp 16   Wt 84.4 kg (186 lb 1.1 oz)   SpO2 94%   BMI 25.24 kg/m²           Constitutional:  No acute distress. HEENT: Head is a traumatic,  Un icteric sclera. Pink conjunctiva,no erythema or discharge. Oral mucous moist, oropharynx benign. Neck supple,    Resp:  B/L air entry.    CV:  Regular rhythm, normal rate, no murmurs, gallops, rubs    GI:  Soft,non  distended, mild generalized tenderness on deep palpation,normoactive bowel sounds, no hepatosplenomegaly    :  No CVA or suprapubic tenderness   Skin  :  Intact    Musculoskeletal:  No edema, warm, 2+ pulses throughout  CNS: Awake, alert and oriented. No focal deficits          No intake or output data in the 24 hours ending 08/21/19 1858         Labs:     Recent Labs     08/20/19 0219 08/19/19 0312   WBC 12.3* 12.2*   HGB 13.0 13.2   HCT 41.2 41.8    186     Recent Labs     08/20/19 0219 08/19/19  0312    139   K 4.1 4.0    106   CO2 21 24   BUN 27* 27*   CREA 1.21 1.27   * 129*   CA 8.6 8.9   MG 2.0  --      No results for input(s): SGOT, GPT, ALT, AP, TBIL, TBILI, TP, ALB, GLOB, GGT, AML, LPSE in the last 72 hours. No lab exists for component: AMYP, HLPSE  No results for input(s): INR, PTP, APTT in the last 72 hours. No lab exists for component: INREXT, INREXT   No results for input(s): FE, TIBC, PSAT, FERR in the last 72 hours. No results found for: FOL, RBCF   No results for input(s): PH, PCO2, PO2 in the last 72 hours. No results for input(s): CPK, CKNDX, TROIQ in the last 72 hours.     No lab exists for component: CPKMB  Lab Results   Component Value Date/Time    Cholesterol, total 154 08/17/2019 05:06 AM    HDL Cholesterol 51 08/17/2019 05:06 AM    LDL, calculated 59.6 08/17/2019 05:06 AM    Triglyceride 217 (H) 08/17/2019 05:06 AM    CHOL/HDL Ratio 3.0 08/17/2019 05:06 AM     No results found for: GLUCPOC  Lab Results   Component Value Date/Time    Color DARK YELLOW 08/17/2019 05:06 AM    Appearance CLOUDY (A) 08/17/2019 05:06 AM    Specific gravity 1.025 08/17/2019 05:06 AM    pH (UA) 5.0 08/17/2019 05:06 AM    Protein NEGATIVE  08/17/2019 05:06 AM    Glucose NEGATIVE  08/17/2019 05:06 AM    Ketone TRACE (A) 08/17/2019 05:06 AM    Bilirubin Negative 10/01/2012 09:00 AM    Urobilinogen 0.2 08/17/2019 05:06 AM    Nitrites NEGATIVE  08/17/2019 05:06 AM    Leukocyte Esterase NEGATIVE 08/17/2019 05:06 AM    Epithelial cells FEW 08/17/2019 05:06 AM    Bacteria 1+ (A) 08/17/2019 05:06 AM    WBC 0-4 08/17/2019 05:06 AM    RBC 0-5 08/17/2019 05:06 AM         Medications Reviewed:     Current Facility-Administered Medications   Medication Dose Route Frequency    [START ON 8/22/2019] heparin (porcine) injection 5,000 Units  5,000 Units SubCUTAneous Q12H    melatonin tablet 3 mg  3 mg Oral QHS    buPROPion SR (WELLBUTRIN SR) tablet 150 mg  150 mg Oral BID    calcium carbonate (TUMS) chewable tablet 400 mg [elemental]  400 mg Oral QID PRN    methylPREDNISolone (PF) (Solu-MEDROL) injection 40 mg  40 mg IntraVENous Q8H    pantoprazole (PROTONIX) 40 mg in sodium chloride 0.9% 10 mL injection  40 mg IntraVENous DAILY    FLUoxetine (PROzac) capsule 20 mg  20 mg Oral DAILY    folic acid tablet 0.8 mg  800 mcg Oral DAILY    mesalamine DR (ASACOL HD) tablet 800 mg  800 mg Oral TID    simvastatin (ZOCOR) tablet 40 mg  40 mg Oral QHS    therapeutic multivitamin (THERAGRAN) tablet 1 Tab  1 Tab Oral DAILY    sodium chloride (NS) flush 5-40 mL  5-40 mL IntraVENous Q8H    sodium chloride (NS) flush 5-40 mL  5-40 mL IntraVENous PRN    acetaminophen (TYLENOL) tablet 650 mg  650 mg Oral Q4H PRN    HYDROcodone-acetaminophen (NORCO) 5-325 mg per tablet 1 Tab  1 Tab Oral Q4H PRN    HYDROmorphone (PF) (DILAUDID) injection 1 mg  1 mg IntraVENous Q4H PRN    lactated Ringers infusion  50 mL/hr IntraVENous CONTINUOUS    ondansetron (ZOFRAN) injection 4 mg  4 mg IntraVENous Q4H PRN    metroNIDAZOLE (FLAGYL) IVPB premix 500 mg  500 mg IntraVENous Q12H    albuterol-ipratropium (DUO-NEB) 2.5 MG-0.5 MG/3 ML  3 mL Nebulization Q4H PRN     ______________________________________________________________________  EXPECTED LENGTH OF STAY: 5d 7h  ACTUAL LENGTH OF STAY:          5                 Maggie Horne MD

## 2019-08-22 PROCEDURE — 74011250636 HC RX REV CODE- 250/636: Performed by: INTERNAL MEDICINE

## 2019-08-22 PROCEDURE — C9113 INJ PANTOPRAZOLE SODIUM, VIA: HCPCS | Performed by: INTERNAL MEDICINE

## 2019-08-22 PROCEDURE — 65270000029 HC RM PRIVATE

## 2019-08-22 PROCEDURE — 74011000250 HC RX REV CODE- 250: Performed by: INTERNAL MEDICINE

## 2019-08-22 PROCEDURE — 74011250637 HC RX REV CODE- 250/637: Performed by: HOSPITALIST

## 2019-08-22 PROCEDURE — 74011250636 HC RX REV CODE- 250/636: Performed by: HOSPITALIST

## 2019-08-22 PROCEDURE — 74011250637 HC RX REV CODE- 250/637: Performed by: INTERNAL MEDICINE

## 2019-08-22 RX ORDER — METRONIDAZOLE 250 MG/1
500 TABLET ORAL EVERY 12 HOURS
Status: DISCONTINUED | OUTPATIENT
Start: 2019-08-22 | End: 2019-08-23

## 2019-08-22 RX ORDER — PANTOPRAZOLE SODIUM 40 MG/1
40 TABLET, DELAYED RELEASE ORAL
Status: DISCONTINUED | OUTPATIENT
Start: 2019-08-23 | End: 2019-08-23 | Stop reason: HOSPADM

## 2019-08-22 RX ADMIN — BUPROPION HYDROCHLORIDE 150 MG: 150 TABLET, EXTENDED RELEASE ORAL at 09:52

## 2019-08-22 RX ADMIN — HEPARIN SODIUM 5000 UNITS: 5000 INJECTION INTRAVENOUS; SUBCUTANEOUS at 06:28

## 2019-08-22 RX ADMIN — SODIUM CHLORIDE 40 MG: 9 INJECTION INTRAMUSCULAR; INTRAVENOUS; SUBCUTANEOUS at 09:52

## 2019-08-22 RX ADMIN — Medication 10 ML: at 21:46

## 2019-08-22 RX ADMIN — METRONIDAZOLE 500 MG: 250 TABLET ORAL at 21:46

## 2019-08-22 RX ADMIN — SIMVASTATIN 40 MG: 40 TABLET, FILM COATED ORAL at 21:46

## 2019-08-22 RX ADMIN — BUPROPION HYDROCHLORIDE 150 MG: 150 TABLET, EXTENDED RELEASE ORAL at 21:46

## 2019-08-22 RX ADMIN — THERA TABS 1 TABLET: TAB at 09:52

## 2019-08-22 RX ADMIN — METHYLPREDNISOLONE SODIUM SUCCINATE 40 MG: 125 INJECTION, POWDER, FOR SOLUTION INTRAMUSCULAR; INTRAVENOUS at 13:53

## 2019-08-22 RX ADMIN — MESALAMINE 800 MG: 800 TABLET, DELAYED RELEASE ORAL at 09:52

## 2019-08-22 RX ADMIN — METHYLPREDNISOLONE SODIUM SUCCINATE 40 MG: 125 INJECTION, POWDER, FOR SOLUTION INTRAMUSCULAR; INTRAVENOUS at 06:28

## 2019-08-22 RX ADMIN — METHYLPREDNISOLONE SODIUM SUCCINATE 40 MG: 125 INJECTION, POWDER, FOR SOLUTION INTRAMUSCULAR; INTRAVENOUS at 21:46

## 2019-08-22 RX ADMIN — MESALAMINE 800 MG: 800 TABLET, DELAYED RELEASE ORAL at 15:58

## 2019-08-22 RX ADMIN — Medication 3 MG: at 21:46

## 2019-08-22 RX ADMIN — Medication 10 ML: at 13:56

## 2019-08-22 RX ADMIN — FOLIC ACID TAB 400 MCG 0.8 MG: 400 TAB at 09:52

## 2019-08-22 RX ADMIN — Medication 10 ML: at 06:31

## 2019-08-22 RX ADMIN — FLUOXETINE 20 MG: 20 CAPSULE ORAL at 09:52

## 2019-08-22 RX ADMIN — METRONIDAZOLE 500 MG: 500 INJECTION, SOLUTION INTRAVENOUS at 02:13

## 2019-08-22 RX ADMIN — MESALAMINE 800 MG: 800 TABLET, DELAYED RELEASE ORAL at 21:46

## 2019-08-22 RX ADMIN — METRONIDAZOLE 500 MG: 250 TABLET ORAL at 12:23

## 2019-08-22 NOTE — PROGRESS NOTES
Hospitalist Progress Note                               Richa Fallon MD                                     Answering service: [phone]                               OR [phone] from in house phone                                         Date of Service:  2019  NAME:  Dl Juarez  :  1950  MRN:  [id number]      Admission Summary:      71 Y/O gentlemanwith a past medical history significant for Crohn's disease, dyslipidemia, COPD, and  depression, was in his usual state of health until the day prior to presentation when the patient developed abdominal pain which got progressively worse. The pain was located in the epigastric region,constant, sharp in nature, 7/10 in severity. No radiation. No known aggravating or relieving factors. The patient has Crohn's disease, with small bowel obstruction as a result of the Crohn's disease which was resected. Due to the worsening symptoms, the patient presented to the emergency room for further evaluation. No fevers, no rigors, no chills. No record of prior admission to this hospital.  When the patient arrived at the emergency room, CT scan of the abdomen and pelvis showed small bowel obstruction. The emergency room physician consulted general surgeon on-call; conservative therapy was advised. The patient was then referred to the hospitalist service for evaluation for admission. Reason for follow up:       CC: abdominal pain. Patient denied any nausea/vomiting,abdominal pain. Did not have bowel movement but passing flatus/gas. Still has some calf pain with walking. Assessment & Plan:   #Small bowel obstruction:improving  -due to active crohn's disease, CT abdomen -Small bowel obstruction with a zone of transition at the level of the terminal ileum, which is thick-walled.  Findings are consistent with the patient's history of Crohn's disease  -Gen surg following  -Diet advanced to GI lite diet today  -Discontinue IVF  -Prn roxicodone for pain. #Crohn's disease exacerbation:  -h/o ileal resection in 1990s. -On mesalamine.  -on IV steroids now. -GI following.  -Had colonoscopy recently which showed active severe crohn's disease  -continue mesalamine.  -was started on empiric levaquin and flagyl -d/c abx and monitor. White count elevation due to steroids    #LE cramps/pain:  - venous dopplers negative for  DVT  -d/c levaquin as it can cause myopathy  -It could also be due to steroids -monitor for now      #CHRISTIAN -resolved:      #Depression: continue home meds    #Hyperlipidemia: on statin    #Sinus bradycardia: assymptomatic. Monitor for now       Plan: Continue conservative management. Discussed plan with patient and Nursing. He is DNR/DNI  DVT prophylaxis: heparin    Dispo; d/c home tomorrow if tolerates diet    Hospital Problems  Date Reviewed: 8/17/2019          Codes Class Noted POA    CHRISTIAN (acute kidney injury) ([email or web address] ICD-10-CM: N17.9  ICD-9-CM: 584.9  8/17/2019 Yes        Small bowel obstruction ([facility] [address] ICD-10-CM: N86.040  ICD-9-CM: 560.9  8/17/2019 Yes        * (Principal) Exacerbation of Crohn's disease ([facility] [address] ICD-10-CM: K50.90  ICD-9-CM: 555.9  8/16/2019 Yes              Physical Examination:      Last 24hrs VS reviewed since prior progress note. Most recent are:  Visit Vitals  /75 (BP 1 Location: Right arm, BP Patient Position: At rest)   Pulse 61   Temp 98.5 °F (36.9 °C)   Resp 18   Wt 84.4 kg (186 lb 1.1 oz)   SpO2 94%   BMI 25.24 kg/m²           Constitutional:  No acute distress. HEENT: Head is a traumatic,  Un icteric sclera. Pink conjunctiva,no erythema or discharge. Oral mucous moist, oropharynx benign. Neck supple,    Resp:  B/L air entry.    CV:  Regular rhythm, normal rate, no murmurs, gallops, rubs    GI:  Soft,non  distended, mild generalized tenderness on deep palpation,normoactive bowel sounds, no hepatosplenomegaly    :  No CVA or suprapubic tenderness   Skin  :  Intact Musculoskeletal:  No edema, warm, 2+ pulses throughout  CNS: Awake, alert and oriented. No focal deficits          No intake or output data in the 24 hours ending 08/22/19 1851         Labs:     Recent Labs     08/20/19 0219   WBC 12.3*   HGB 13.0   HCT 41.2        Recent Labs     08/20/19 0219      K 4.1      CO2 21   BUN 27*   CREA 1.21   *   CA 8.6   MG 2.0     No results for input(s): SGOT, GPT, ALT, AP, TBIL, TBILI, TP, ALB, GLOB, GGT, AML, LPSE in the last 72 hours. No lab exists for component: AMYP, HLPSE  No results for input(s): INR, PTP, APTT in the last 72 hours. No lab exists for component: INREXT, INREXT   No results for input(s): FE, TIBC, PSAT, FERR in the last 72 hours. No results found for: FOL, RBCF   No results for input(s): PH, PCO2, PO2 in the last 72 hours. No results for input(s): CPK, CKNDX, TROIQ in the last 72 hours.     No lab exists for component: CPKMB  Lab Results   Component Value Date/Time    Cholesterol, total 154 08/17/2019 05:06 AM    HDL Cholesterol 51 08/17/2019 05:06 AM    LDL, calculated 59.6 08/17/2019 05:06 AM    Triglyceride 217 (H) 08/17/2019 05:06 AM    CHOL/HDL Ratio 3.0 08/17/2019 05:06 AM     No results found for: GLUCPOC  Lab Results   Component Value Date/Time    Color DARK YELLOW 08/17/2019 05:06 AM    Appearance CLOUDY (A) 08/17/2019 05:06 AM    Specific gravity 1.025 08/17/2019 05:06 AM    pH (UA) 5.0 08/17/2019 05:06 AM    Protein NEGATIVE  08/17/2019 05:06 AM    Glucose NEGATIVE  08/17/2019 05:06 AM    Ketone TRACE (A) 08/17/2019 05:06 AM    Bilirubin Negative 10/01/2012 09:00 AM    Urobilinogen 0.2 08/17/2019 05:06 AM    Nitrites NEGATIVE  08/17/2019 05:06 AM    Leukocyte Esterase NEGATIVE  08/17/2019 05:06 AM    Epithelial cells FEW 08/17/2019 05:06 AM    Bacteria 1+ (A) 08/17/2019 05:06 AM    WBC 0-4 08/17/2019 05:06 AM    RBC 0-5 08/17/2019 05:06 AM         Medications Reviewed:     Current Facility-Administered Medications Medication Dose Route Frequency    metroNIDAZOLE (FLAGYL) tablet 500 mg  500 mg Oral Q12H    [START ON 8/23/2019] pantoprazole (PROTONIX) tablet 40 mg  40 mg Oral ACB    heparin (porcine) injection 5,000 Units  5,000 Units SubCUTAneous Q12H    melatonin tablet 3 mg  3 mg Oral QHS    buPROPion SR (WELLBUTRIN SR) tablet 150 mg  150 mg Oral BID    calcium carbonate (TUMS) chewable tablet 400 mg [elemental]  400 mg Oral QID PRN    methylPREDNISolone (PF) (Solu-MEDROL) injection 40 mg  40 mg IntraVENous Q8H    FLUoxetine (PROzac) capsule 20 mg  20 mg Oral DAILY    folic acid tablet 0.8 mg  800 mcg Oral DAILY    mesalamine DR (ASACOL HD) tablet 800 mg  800 mg Oral TID    simvastatin (ZOCOR) tablet 40 mg  40 mg Oral QHS    therapeutic multivitamin (THERAGRAN) tablet 1 Tab  1 Tab Oral DAILY    sodium chloride (NS) flush 5-40 mL  5-40 mL IntraVENous Q8H    sodium chloride (NS) flush 5-40 mL  5-40 mL IntraVENous PRN    acetaminophen (TYLENOL) tablet 650 mg  650 mg Oral Q4H PRN    HYDROcodone-acetaminophen (NORCO) 5-325 mg per tablet 1 Tab  1 Tab Oral Q4H PRN    HYDROmorphone (PF) (DILAUDID) injection 1 mg  1 mg IntraVENous Q4H PRN    lactated Ringers infusion  50 mL/hr IntraVENous CONTINUOUS    ondansetron (ZOFRAN) injection 4 mg  4 mg IntraVENous Q4H PRN    albuterol-ipratropium (DUO-NEB) 2.5 MG-0.5 MG/3 ML  3 mL Nebulization Q4H PRN     ______________________________________________________________________  EXPECTED LENGTH OF STAY: 5d 7h  ACTUAL LENGTH OF STAY:          6                 Keyonheriberto Coleman MD

## 2019-08-22 NOTE — PROGRESS NOTES
Bedside shift change report given to AFUA Kelley (oncoming nurse) by Pantera Wang RN (offgoing nurse). Report included the following information SBAR, Kardex, Procedure Summary, Intake/Output, MAR and Recent Results.

## 2019-08-22 NOTE — PROGRESS NOTES
Clinical Pharmacy Note: Re: IV to PO Automatic Conversion - Antibiotic    Please note: Taz Ny medication(s) (metronidazole and pantoprazole) has/have been changed from IV to PO based on the following criteria:    The patient:  1. Has received IV therapy for at least 48 hours   2. Has a functioning GI tract  - Taking scheduled oral medications  - Tolerating tube feeds at goal rate or a full liquid, soft, or regular diet         3. Is clinically stable        - Temperature < 100.4F for at least 24 hours        - WBC is trending down    This IV to PO conversion is based on the [employer] approved automatic conversion policy for eligible patients. Please call with questions. Lyudmila Mckeon Pharm. D., BCPS

## 2019-08-22 NOTE — PROGRESS NOTES
[address]  [address] [facility], [address]       GI PROGRESS NOTE  [facility] Console  [phone] office  [phone] NP/PA in-hospital cell phone M-F until 4:30PM  After 5PM or on weekends, please call  for physician on call      NAME: Misbah Coughlin   :  1950   MRN:  [id number]       Subjective:   Patient is sitting in the chair. No nausea or abdominal pain. Passing flatus. No bowel movement. He is tolerating full liquids. Objective:     VITALS:   Last 24hrs VS reviewed since prior progress note. Most recent are:  Visit Vitals  /66 (BP 1 Location: Right arm, BP Patient Position: At rest)   Pulse 61   Temp 97.8 °F (36.6 °C)   Resp 18   Wt 84.4 kg (186 lb 1.1 oz)   SpO2 94%   BMI 25.24 kg/m²       PHYSICAL EXAM:  General: Cooperative, no acute distress    Neurologic:  Alert and oriented  HEENT: EOMI, no scleral icterus   Lungs:  CTA bilaterally anteriorly  Heart:  S1 S2  Abdomen: Soft, mildly distended, no tenderness, no guarding, no rebound. +Bowel sounds. Extremities: Warm  Psych:   Not anxious or agitated    Lab Data Reviewed:     No results found for this or any previous visit (from the past 24 hour(s)). Assessment:   · Crohn's disease: status post segmental ileal resection in early , on Asacol. CT abdomen/pelvis without contrast (19): small bowel obstruction with a zone of transition at the level of the terminal ileum, thick-walled; findings consistent with patient's history of Crohn's disease; additional nonobstructing area of wall thickening involving the distal transverse colon. Quantiferon-TB pending. HepB surface Ag negative.    · Small bowel obstruction     Patient Active Problem List   Diagnosis Code    Hyperlipidemia E78.5    Crohn disease ([address] K50.90    Encounter for long-term (current) drug use Z79.899    COPD (chronic obstructive pulmonary disease) ([facility] [address] J44.9    Exacerbation of Crohn's disease ([facility] [address] K50.90    CHRISTIAN (acute kidney injury) ([facility] [address] N17.9    Small bowel obstruction ([facility] [address] K56.609     Plan:   · On full liquids  · Continue mesalamine  · Continue steroids  · Continue supportive measures  · Quantiferon-TB pending for possible biologic therapy  · Surgery signed off     Signed By: PHILLIP Portillo     8/22/2019  10:27 AM       This patient was seen and examined by me in a face-to-face visit today. I reviewed the medical record including lab work, imaging and other provider notes. I confirmed the interval history as described above. I spoke to the patient, discussing our findings and plans. I discussed this case in detail with Sandoval Cummings. I formulated an updated  assessment of this patient and guided our treatment plan. I agree with the above progress note. I agree with the history, exam and assessment and plan as outlined in the note. I would like to add the following: He doing weill. Passing lots of fltus but no bowel movement. Abdominal exam is benign. I plan to advance to GI lite diet,. If tolerated.  I would recommend dicharge on Prednisone 40 mg per day, asacol and we will arrange starting humira next week,

## 2019-08-22 NOTE — PROGRESS NOTES
Problem: Falls - Risk of  Goal: *Absence of Falls  Description  Document Janki Frank Fall Risk and appropriate interventions in the flowsheet.   Outcome: Progressing Towards Goal  Note:   Fall Risk Interventions:  Mobility Interventions: Patient to call before getting OOB         Medication Interventions: Teach patient to arise slowly

## 2019-08-23 VITALS
TEMPERATURE: 98.1 F | HEART RATE: 64 BPM | OXYGEN SATURATION: 94 % | SYSTOLIC BLOOD PRESSURE: 131 MMHG | DIASTOLIC BLOOD PRESSURE: 62 MMHG | RESPIRATION RATE: 18 BRPM | WEIGHT: 186.07 LBS | BODY MASS INDEX: 25.24 KG/M2

## 2019-08-23 LAB
ALBUMIN SERPL-MCNC: 2.8 G/DL (ref 3.5–5)
ALBUMIN/GLOB SERPL: 0.9 {RATIO} (ref 1.1–2.2)
ALP SERPL-CCNC: 60 U/L (ref 45–117)
ALT SERPL-CCNC: 38 U/L (ref 12–78)
ANION GAP SERPL CALC-SCNC: 8 MMOL/L (ref 5–15)
AST SERPL-CCNC: 20 U/L (ref 15–37)
BASOPHILS # BLD: 0 K/UL (ref 0–0.1)
BASOPHILS NFR BLD: 0 % (ref 0–1)
BILIRUB SERPL-MCNC: 0.9 MG/DL (ref 0.2–1)
BUN SERPL-MCNC: 29 MG/DL (ref 6–20)
BUN/CREAT SERPL: 23 (ref 12–20)
CALCIUM SERPL-MCNC: 8.6 MG/DL (ref 8.5–10.1)
CHLORIDE SERPL-SCNC: 106 MMOL/L (ref 97–108)
CO2 SERPL-SCNC: 27 MMOL/L (ref 21–32)
CREAT SERPL-MCNC: 1.27 MG/DL (ref 0.7–1.3)
DIFFERENTIAL METHOD BLD: ABNORMAL
EOSINOPHIL # BLD: 0 K/UL (ref 0–0.4)
EOSINOPHIL NFR BLD: 0 % (ref 0–7)
ERYTHROCYTE [DISTWIDTH] IN BLOOD BY AUTOMATED COUNT: 13.8 % (ref 11.5–14.5)
GLOBULIN SER CALC-MCNC: 3.1 G/DL (ref 2–4)
GLUCOSE SERPL-MCNC: 127 MG/DL (ref 65–100)
HCT VFR BLD AUTO: 43.2 % (ref 36.6–50.3)
HGB BLD-MCNC: 14 G/DL (ref 12.1–17)
IMM GRANULOCYTES # BLD AUTO: 0.2 K/UL (ref 0–0.04)
IMM GRANULOCYTES NFR BLD AUTO: 2 % (ref 0–0.5)
LYMPHOCYTES # BLD: 0.7 K/UL (ref 0.8–3.5)
LYMPHOCYTES NFR BLD: 7 % (ref 12–49)
MCH RBC QN AUTO: 28.4 PG (ref 26–34)
MCHC RBC AUTO-ENTMCNC: 32.4 G/DL (ref 30–36.5)
MCV RBC AUTO: 87.6 FL (ref 80–99)
MONOCYTES # BLD: 0.4 K/UL (ref 0–1)
MONOCYTES NFR BLD: 4 % (ref 5–13)
NEUTS SEG # BLD: 9.3 K/UL (ref 1.8–8)
NEUTS SEG NFR BLD: 87 % (ref 32–75)
NRBC # BLD: 0 K/UL (ref 0–0.01)
NRBC BLD-RTO: 0 PER 100 WBC
PLATELET # BLD AUTO: 175 K/UL (ref 150–400)
PMV BLD AUTO: 10.2 FL (ref 8.9–12.9)
POTASSIUM SERPL-SCNC: 3.9 MMOL/L (ref 3.5–5.1)
PROT SERPL-MCNC: 5.9 G/DL (ref 6.4–8.2)
RBC # BLD AUTO: 4.93 M/UL (ref 4.1–5.7)
RBC MORPH BLD: ABNORMAL
SODIUM SERPL-SCNC: 141 MMOL/L (ref 136–145)
WBC # BLD AUTO: 10.6 K/UL (ref 4.1–11.1)

## 2019-08-23 PROCEDURE — 85025 COMPLETE CBC W/AUTO DIFF WBC: CPT

## 2019-08-23 PROCEDURE — 74011250637 HC RX REV CODE- 250/637: Performed by: INTERNAL MEDICINE

## 2019-08-23 PROCEDURE — 80053 COMPREHEN METABOLIC PANEL: CPT

## 2019-08-23 PROCEDURE — 36415 COLL VENOUS BLD VENIPUNCTURE: CPT

## 2019-08-23 PROCEDURE — 74011250636 HC RX REV CODE- 250/636: Performed by: INTERNAL MEDICINE

## 2019-08-23 PROCEDURE — 74011250637 HC RX REV CODE- 250/637: Performed by: HOSPITALIST

## 2019-08-23 RX ORDER — POLYETHYLENE GLYCOL 3350 17 G/17G
17 POWDER, FOR SOLUTION ORAL
Qty: 7 EACH | Refills: 0 | Status: SHIPPED | OUTPATIENT
Start: 2019-08-23

## 2019-08-23 RX ORDER — PREDNISONE 20 MG/1
40 TABLET ORAL
Qty: 10 TAB | Refills: 0 | Status: SHIPPED | OUTPATIENT
Start: 2019-08-23 | End: 2019-08-23 | Stop reason: SDUPTHER

## 2019-08-23 RX ORDER — PREDNISONE 20 MG/1
40 TABLET ORAL
Qty: 28 TAB | Refills: 0 | Status: SHIPPED | OUTPATIENT
Start: 2019-08-23 | End: 2019-09-06

## 2019-08-23 RX ORDER — PANTOPRAZOLE SODIUM 40 MG/1
40 TABLET, DELAYED RELEASE ORAL
Qty: 15 TAB | Refills: 0 | Status: SHIPPED | OUTPATIENT
Start: 2019-08-24

## 2019-08-23 RX ADMIN — PANTOPRAZOLE SODIUM 40 MG: 40 TABLET, DELAYED RELEASE ORAL at 06:41

## 2019-08-23 RX ADMIN — FLUOXETINE 20 MG: 20 CAPSULE ORAL at 08:34

## 2019-08-23 RX ADMIN — METHYLPREDNISOLONE SODIUM SUCCINATE 40 MG: 125 INJECTION, POWDER, FOR SOLUTION INTRAMUSCULAR; INTRAVENOUS at 06:02

## 2019-08-23 RX ADMIN — METHYLPREDNISOLONE SODIUM SUCCINATE 40 MG: 125 INJECTION, POWDER, FOR SOLUTION INTRAMUSCULAR; INTRAVENOUS at 14:46

## 2019-08-23 RX ADMIN — BUPROPION HYDROCHLORIDE 150 MG: 150 TABLET, EXTENDED RELEASE ORAL at 08:34

## 2019-08-23 RX ADMIN — MESALAMINE 800 MG: 800 TABLET, DELAYED RELEASE ORAL at 08:34

## 2019-08-23 RX ADMIN — Medication 10 ML: at 14:46

## 2019-08-23 RX ADMIN — Medication 10 ML: at 06:02

## 2019-08-23 RX ADMIN — METRONIDAZOLE 500 MG: 250 TABLET ORAL at 08:34

## 2019-08-23 RX ADMIN — THERA TABS 1 TABLET: TAB at 08:31

## 2019-08-23 RX ADMIN — FOLIC ACID TAB 400 MCG 0.8 MG: 400 TAB at 08:34

## 2019-08-23 NOTE — PROGRESS NOTES
Hospital follow-up PCP transitional care appointment has been scheduled with Dr. Andrea Cummins for Tuesday, 8/27/19 at 3:30 p.m. Pending patient discharge.   Adali Benjamin, Care Management Specialist.

## 2019-08-23 NOTE — PROGRESS NOTES
Patient will discharge home today with his wife transporting. He does not have any discharge concerns at this time. CM explained IM letter to patient, he accepted and signed a copy for the chart.     Iona Moses, [facility]

## 2019-08-23 NOTE — ROUTINE PROCESS
Bedside and Verbal shift change report given to Dorian Navarrete (oncoming nurse) by Dionna Alonso (offgoing nurse). Report included the following information SBAR.

## 2019-08-23 NOTE — PROGRESS NOTES
Problem: Pain  Goal: *Control of Pain  Outcome: Progressing Towards Goal  No complaints of pain. Problem: Falls - Risk of  Goal: *Absence of Falls  Description  Document Nathan Irb[place]ouie Fall Risk and appropriate interventions in the flowsheet.   Outcome: Progressing Towards Goal  Note:   Fall Risk Interventions:  Mobility Interventions: Patient to call before getting OOB    Medication Interventions: Patient to call before getting OOB, Teach patient to arise slowly      Problem: General Medical Care Plan  Goal: *Vital signs within specified parameters  Outcome: Progressing Towards Goal  Goal: *Labs within defined limits  Outcome: Progressing Towards Goal  Goal: *Absence of infection signs and symptoms  Outcome: Progressing Towards Goal  Goal: *Optimal pain control at patient's stated goal  Outcome: Progressing Towards Goal  Goal: *Skin integrity maintained  Outcome: Progressing Towards Goal  Goal: *Fluid volume balance  Outcome: Progressing Towards Goal  Goal: *Optimize nutritional status  Outcome: Progressing Towards Goal  Goal: *Anxiety reduced or absent  Outcome: Progressing Towards Goal  Goal: *Progressive mobility and function (eg: ADL's)  Outcome: Progressing Towards Goal

## 2019-08-25 NOTE — DISCHARGE SUMMARY
Discharge Summary       PATIENT ID: Siddhartha Juarez  MRN: [id number]   YOB: 1950    DATE OF ADMISSION: 8/16/2019  6:27 PM    DATE OF DISCHARGE: 8/23/2019   PRIMARY CARE PROVIDER: Cresencio Burgos MD     ATTENDING PHYSICIAN: Arnold Claros MD    DISCHARGING PROVIDER: Ally Velarde MD    To contact this individual call [phone] and ask the  to page. If unavailable ask to be transferred the Adult Hospitalist Department. CONSULTATIONS: IP CONSULT TO GASTROENTEROLOGY    PROCEDURES/SURGERIES: * No surgery found *    ADMITTING DIAGNOSES & HOSPITAL COURSE:     DISCHARGE DIAGNOSES / PLAN:      69 Y/O gentlemanwith a past medical history significant for Crohn's disease, dyslipidemia, COPD, and  depression, was in his usual state of health until the day prior to presentation when the patient developed abdominal pain which got progressively worse. The pain was located in the epigastric region,constant, sharp in nature, 7/10 in severity. No radiation. No known aggravating or relieving factors. The patient has Crohn's disease, with small bowel obstruction as a result of the Crohn's disease which was resected. Due to the worsening symptoms, the patient presented to the emergency room for further evaluation.  No fevers, no rigors, no chills.  No record of prior admission to this hospital.  When the patient arrived at the emergency room, CT scan of the abdomen and pelvis showed small bowel obstruction.  The emergency room physician consulted general surgeon on-call; conservative therapy was advised.  The patient was then referred to the hospitalist service for evaluation for admission.     #Small bowel obstruction:improved  -due to active crohn's disease, CT abdomen -Small bowel obstruction with a zone of transition at the level of the terminal ileum, which is thick-walled.  Findings are consistent with the patient's history of Crohn's disease  -Gen surg following  -Diet advanced to GI lite diet -tolerating well       #Crohn's disease exacerbation:  -h/o ileal resection in 1990s. -On mesalamine.  -on IV steroids in the hospital - discharged on oral prednisone. -GI following.  -Had colonoscopy recently which showed active severe crohn's disease  -continue mesalamine.  -was started on empiric levaquin and flagyl -d/c abx and monitor. White count elevation due to steroids     #LE cramps/pain:improving  - venous dopplers negative for  DVT  -d/c levaquin as it can cause myopathy  -It could also be due to steroids -monitor for now        #CHRISTIAN -resolved:        #Depression: continue home meds     #Hyperlipidemia: on statin     #Sinus bradycardia: assymptomatic. Monitor for now        PENDING TEST RESULTS:   At the time of discharge the following test results are still pending: none    Xr Abd Acute W 1 V Chest    Result Date: 8/19/2019  INDICATION: Worsening abdominal pain and distention FINDINGS: Frontal chest radiograph demonstrates a normal cardiomediastinal silhouette and clear lungs bilaterally. No intraperitoneal free air is seen. Upright and supine views of the abdomen demonstrate diffuse small bowel distention with air-fluid levels. . No soft tissue mass or pathological soft tissue calcification is seen. The osseous structures are unremarkable. IMPRESSION: Diffuse small bowel distention with air-fluid levels, compatible with distal small bowel obstruction. Ct Abd Pelv Wo Cont    Result Date: 8/16/2019  EXAM: CT ABD PELV WO CONT INDICATION: upper abdominal pain, diaphoresis COMPARISON: CT chest of 8/31/2010 CONTRAST:  None. TECHNIQUE: Thin axial images were obtained through the abdomen and pelvis. Coronal and sagittal reconstructions were generated. Oral contrast was not administered. CT dose reduction was achieved through use of a standardized protocol tailored for this examination and automatic exposure control for dose modulation.  The absence of intravenous contrast material reduces the sensitivity for evaluation of the solid parenchymal organs of the abdomen. FINDINGS: LUNG BASES: Mild bibasilar discoid atelectasis. No pleural fluid. INCIDENTALLY IMAGED HEART AND MEDIASTINUM: Unremarkable. LIVER: No mass or biliary dilatation. GALLBLADDER: Unremarkable. SPLEEN: No mass. PANCREAS: No mass or ductal dilatation. ADRENALS: Low attenuation left adrenal nodule, unchanged since 2010, likely representing a benign adenoma. Normal appearance of the right adrenal gland. KIDNEYS/URETERS: No mass, calculus, or hydronephrosis. Right renal cysts. STOMACH: Markedly distended with fluid. . SMALL BOWEL: Moderate fluid-filled dilatation of the small bowel. A zone of transition in the right lower quadrant where there is a long segment thick-walled loop of terminal ileum, at least 10 cm in length. No other thick-walled segments of small bowel. COLON: Thick-walled narrowed segment of distal transverse colon which does not appear obstructing. APPENDIX: Not identified. Not distended. PERITONEUM: No ascites or pneumoperitoneum. RETROPERITONEUM: No lymphadenopathy or aortic aneurysm. REPRODUCTIVE ORGANS: Mild prostatic enlargement. URINARY BLADDER: Nondistended. BONES: Lower lumbar spine degenerative changes. ADDITIONAL COMMENTS: N/A     IMPRESSION: 1. Small bowel obstruction with a zone of transition at the level of the terminal ileum, which is thick-walled. Findings are consistent with the patient's history of Crohn's disease. 2. Additional nonobstructing area of wall thickening involving the distal transverse colon. Also consistent with Crohn's disease.     FOLLOW UP APPOINTMENTS:    Follow-up Information     Follow up With Specialties Details Why Contact Info    Lali Ugarte MD Family Practice On 8/27/2019 Hospital f/u PCP appointment Tuesday, 8/27/19 @ 3:30 p.m.  [address] [place] [address]   [address] [address]      Donna Davis MD Gastroenterology In 1 week  [address]  [phone]               ADDITIONAL CARE RECOMMENDATIONS:   -Take prednisone 40 mg daily for crohns disease  -follow up with Vanessa Esposito next week for further management of crohns disease  -Take miralax for constipation'    DIET: Regular Diet      ACTIVITY: Activity as tolerated    WOUND CARE: na    EQUIPMENT needed: na        DISCHARGE MEDICATIONS:  Discharge Medication List as of 8/23/2019  3:20 PM      START taking these medications    Details   pantoprazole (PROTONIX) 40 mg tablet Take 1 Tab by mouth Daily (before breakfast). , Normal, Disp-15 Tab, R-0      polyethylene glycol (MIRALAX) 17 gram packet Take 1 Packet by mouth daily as needed (constipation). , Normal, Disp-7 Each, R-0         CONTINUE these medications which have CHANGED    Details   predniSONE (DELTASONE) 20 mg tablet Take 40 mg by mouth daily (with breakfast) for 14 days. , Normal, Disp-28 Tab, R-0         CONTINUE these medications which have NOT CHANGED    Details   mesalamine DR (ASACOL HD) 800 mg DR tablet Take 800 mg by mouth three (3) times daily. , Historical Med      FLUoxetine (PROZAC) 20 mg capsule Take 20 mg by mouth daily. , Historical Med      MULTIVITAMIN PO Take  by mouth. Takes one po once daily. , Historical Med      folic acid 528 mcg tablet Take 800 mcg by mouth daily. , Historical Med      simvastatin (ZOCOR) 40 mg tablet TAKE 1 TABLET DAILY, Normal, Disp-90 Tab, R-3      buPROPion SR (WELLBUTRIN SR) 150 mg SR tablet TAKE 1 TABLET TWICE A DAY, Normal, Disp-180 Tab, R-1               NOTIFY YOUR PHYSICIAN FOR ANY OF THE FOLLOWING:   Fever over 101 degrees for 24 hours. Chest pain, shortness of breath, fever, chills, nausea, vomiting, diarrhea, change in mentation, falling, weakness, bleeding. Severe pain or pain not relieved by medications. Or, any other signs or symptoms that you may have questions about.     DISPOSITION:  X  Home With:   OT  PT  SONYA  RN       Long term SNF/Inpatient Rehab Independent/assisted living    Hospice    Other:       PATIENT CONDITION AT DISCHARGE:     Functional status    Poor     Deconditioned    X Independent      Cognition    X Lucid     Forgetful     Dementia      Catheters/lines (plus indication)    Emery     PICC     PEG    X None      Code status   X  Full code     DNR      PHYSICAL EXAMINATION AT DISCHARGE:     Constitutional:  No acute distress. HEENT: Head is a traumatic,  Un icteric sclera. Pink conjunctiva,no erythema or discharge. Oral mucous moist, oropharynx benign. Neck supple,    Resp:  B/L air entry. CV:  Regular rhythm, normal rate, no murmurs, gallops, rubs    GI:  Soft,non  distended, mild generalized tenderness on deep palpation,normoactive bowel sounds, no hepatosplenomegaly    :  No CVA or suprapubic tenderness   Skin  :  Intact    Musculoskeletal:  No edema, warm, 2+ pulses throughout  CNS: Awake, alert and oriented. No focal deficits           CHRONIC MEDICAL DIAGNOSES:  Problem List as of 8/23/2019 Date Reviewed: 8/17/2019          Codes Class Noted - Resolved    CHRISTIAN (acute kidney injury) ([facility] ICD-10-CM: N17.9  ICD-9-CM: 584.9  8/17/2019 - Present        Small bowel obstruction ([facility] ICD-10-CM: H39.527  ICD-9-CM: 560.9  8/17/2019 - Present        * (Principal) Exacerbation of Crohn's disease ([facility] ICD-10-CM: K50.90  ICD-9-CM: 555.9  8/16/2019 - Present        COPD (chronic obstructive pulmonary disease) ([facility] ICD-10-CM: J44.9  ICD-9-CM: 476  Unknown - Present        Hyperlipidemia ICD-10-CM: E78.5  ICD-9-CM: 272.4  8/30/2011 - Present        Crohn disease ([facility] ICD-10-CM: K50.90  ICD-9-CM: 555.9  8/30/2011 - Present        Encounter for long-term (current) drug use ICD-10-CM: Z79.899  ICD-9-CM: V58.69  8/30/2011 - Present              35 minutes were spent with the patient on counseling and coordination of care    Signed:   Karla Hunter MD  8/25/2019  3:39 PM

## 2019-08-27 LAB
M TB IFN-G BLD-IMP: NEGATIVE
QUANTIFERON CRITERIA, QFI1T: NORMAL
QUANTIFERON MITOGEN VALUE: 0.78 IU/ML
QUANTIFERON NIL VALUE: 0.02 IU/ML
QUANTIFERON TB1 AG: 0.03 IU/ML
QUANTIFERON TB2 AG: 0.02 IU/ML

## 2019-08-27 NOTE — DISCHARGE INSTRUCTIONS
Discharge Instructions       PATIENT ID: Ernesto Thompson  MRN: [id number]   YOB: 1950    DATE OF ADMISSION: 8/16/2019  6:27 PM    DATE OF DISCHARGE: 8/23/2019    PRIMARY CARE PROVIDER: Alyx Loredo MD     ATTENDING PHYSICIAN: Jh Jensen MD  DISCHARGING PROVIDER: Sabi Sykes MD    To contact this individual call [phone] and ask the  to page. If unavailable ask to be transferred the Adult Hospitalist Department. DISCHARGE DIAGNOSES Crohn's disease exacerbation,small bowel obstruction. CONSULTATIONS: IP CONSULT TO GENERAL SURGERY  IP CONSULT TO GASTROENTEROLOGY    PROCEDURES/SURGERIES: * No surgery found *    PENDING TEST RESULTS:   At the time of discharge the following test results are still pending: none    Xr Abd Acute W 1 V Chest    Result Date: 8/19/2019  IMPRESSION: Diffuse small bowel distention with air-fluid levels, compatible with distal small bowel obstruction. Ct Abd Pelv Wo Cont    Result Date: 8/16/2019  IMPRESSION: 1. Small bowel obstruction with a zone of transition at the level of the terminal ileum, which is thick-walled. Findings are consistent with the patient's history of Crohn's disease. 2. Additional nonobstructing area of wall thickening involving the distal transverse colon. Also consistent with Crohn's disease.         FOLLOW UP APPOINTMENTS:   Follow-up Information     Follow up With Specialties Details Why Contact Info    Alyx Loredo MD Family Practice In 1 week  [address] [place] [address]   [address] [place] [address] [address] [place] [address]      Neymar Batista MD Gastroenterology In 1 week  [facility]  [phone]             ADDITIONAL CARE RECOMMENDATIONS:   -Take prednisone 40 mg daily for crohns disease  -follow up with Rafael Hardin next week for further management of crohns disease  -Take miralax for constipation'    DIET: Regular Diet      ACTIVITY: Activity as tolerated    WOUND CARE: na    EQUIPMENT needed: na      DISCHARGE MEDICATIONS:   See Medication Reconciliation Form    · It is important that you take the medication exactly as they are prescribed. · Keep your medication in the bottles provided by the pharmacist and keep a list of the medication names, dosages, and times to be taken in your wallet. · Do not take other medications without consulting your doctor. NOTIFY YOUR PHYSICIAN FOR ANY OF THE FOLLOWING:   Fever over 101 degrees for 24 hours. Chest pain, shortness of breath, fever, chills, nausea, vomiting, diarrhea, change in mentation, falling, weakness, bleeding. Severe pain or pain not relieved by medications. Or, any other signs or symptoms that you may have questions about.       DISPOSITION:  X  Home With:   OT  PT  HH  RN       SNF/Inpatient Rehab/LTAC    Independent/assisted living    Hospice    Other:           Signed:   Dominic Caban MD  8/23/2019  8:57 AM

## 2020-09-03 NOTE — PROGRESS NOTES
Continued Stay Review    Date: 09/03/2020                          Current Patient Class: Inpatient  Current Level of Care: Level 2 stepdown    HPI:52 y o  male initially admitted on 08/30/2020   Brain mass  Assessment/Plan: Waiting brain bx results  Neuro checks q2h  Continue IV decadron  Pertinent Labs/Diagnostic Results:     Results from last 7 days   Lab Units 09/03/20  0546 09/02/20  0455 09/01/20  2109 09/01/20  0503 08/31/20  0511  08/30/20  1422   WBC Thousand/uL 14 92* 15 18* 14 42* 10 69* 8 59  --  7 40   HEMOGLOBIN g/dL 14 0 14 6 14 9 16 1 15 9  --  16 6   HEMATOCRIT % 40 4 42 6 43 8 46 8 46 4  --  48 7*   PLATELETS Thousands/uL 166 188 183 180 206   < > 193   NEUTROS ABS Thousands/µL  --   --   --  7 72* 7 23  --  4 70   BANDS PCT %  --   --  2  --   --   --   --     < > = values in this interval not displayed       Results from last 7 days   Lab Units 09/03/20  0546 09/02/20  0455 09/01/20 2109 09/01/20  0503 08/31/20  0510   SODIUM mmol/L 144 141 145 143 139   POTASSIUM mmol/L 3 9 3 7 3 9 3 7 4 0   CHLORIDE mmol/L 112* 108 113* 110* 107   CO2 mmol/L 27 27 26 27 28   ANION GAP mmol/L 5 6 6 6 4   BUN mg/dL 16 11 13 16 16   CREATININE mg/dL 0 60 0 66 0 85 0 71 0 84   EGFR ml/min/1 73sq m 115 110 99 107 100   CALCIUM mg/dL 8 5 8 5 8 1* 8 8 8 8   MAGNESIUM mg/dL 2 5 2 2 2 1  --   --    PHOSPHORUS mg/dL 3 1 3 4 3 8  --   --      Results from last 7 days   Lab Units 08/31/20  0510 08/30/20  1422   AST U/L 16 16   ALT U/L 30 22   ALK PHOS U/L 84 69   TOTAL PROTEIN g/dL 7 4 7 3   ALBUMIN g/dL 3 6 4 7   TOTAL BILIRUBIN mg/dL 0 36 0 50     Results from last 7 days   Lab Units 09/02/20  1758 09/02/20  1152 09/02/20  0636 09/01/20  2352 08/30/20  1305   POC GLUCOSE mg/dl 121 145* 126 112 107     Results from last 7 days   Lab Units 09/03/20  0546 09/02/20  0455 09/01/20  2109 09/01/20  0503 08/31/20  0510 08/30/20  1422   GLUCOSE RANDOM mg/dL 139 136 124 108 119 99     Results from last 7 days   Lab Units Spiritual Care Assessment/Progress Note  [facility]      NAME: Alejandro Silva      MRN: [id number]  AGE: 70 y.o. SEX: male  Yarsanism Affiliation: Uatsdin   Language: English     8/21/2019     Total Time (in minutes): 16     Spiritual Assessment begun in [facility] 6W1 ORTHO SPINE through conversation with:         [x]Patient        [x] Family    [] Friend(s)        Reason for Consult: Initial/Spiritual assessment, patient floor     Spiritual beliefs: (Please include comment if needed)     [x] Identifies with a audrey tradition: Uatsdin        [] Supported by a audrey community:            [] Claims no spiritual orientation:           [] Seeking spiritual identity:                [] Adheres to an individual form of spirituality:           [] Not able to assess:                           Identified resources for coping:      [] Prayer                               [] Music                  [] Guided Imagery     [x] Family/friends                 [] Pet visits     [] Devotional reading                         [] Unknown     [] Other:                                               Interventions offered during this visit: (See comments for more details)    Patient Interventions: Affirmation of emotions/emotional suffering, Affirmation of audrey, Catharsis/review of pertinent events in supportive environment, Coping skills reviewed/reinforced, Iconic (affirming the presence of God/Higher Power)     Family/Friend(s):  Affirmation of audrey, Coping skills reviewed/reinforced, Iconic (affirming the presence of God/Higher Power)(Wife)     Plan of Care:     [] Support spiritual and/or cultural needs    [] Support AMD and/or advance care planning process      [] Support grieving process   [] Coordinate Rites and/or Rituals    [] Coordination with community clergy   [] No spiritual needs identified at this time   [] Detailed Plan of Care below (See Comments)  [] Make referral to Music Therapy  [] Make referral to Pet Therapy     [] 08/30/20  1422   TROPONIN I ng/mL <0 03     Results from last 7 days   Lab Units 09/02/20  0455 09/01/20  0503 08/30/20  1422   PROTIME seconds 13 5 13 3 13 3   INR  1 03 1 01 1 02   PTT seconds 23 25 24     Results from last 7 days   Lab Units 08/30/20  1422   LACTIC ACID mmol/L 1 2     Results from last 7 days   Lab Units 08/31/20  0622   CLARITY UA  Clear   COLOR UA  Yellow   SPEC GRAV UA  >1 045*   PH UA  6 0   GLUCOSE UA mg/dl Negative   KETONES UA mg/dl Negative   BLOOD UA  Negative   PROTEIN UA mg/dl Negative   NITRITE UA  Negative   BILIRUBIN UA  Negative   UROBILINOGEN UA E U /dl 1 0   LEUKOCYTES UA  Negative   WBC UA /hpf None Seen   RBC UA /hpf None Seen   BACTERIA UA /hpf None Seen   EPITHELIAL CELLS WET PREP /hpf None Seen     Vital Signs: /78   Pulse 68   Temp 98 1 °F (36 7 °C)   Resp 21   Ht 5' 10" (1 778 m)   Wt 88 3 kg (194 lb 10 7 oz)   SpO2 90%   BMI 27 93 kg/m²       Medications:   Scheduled Medications:  acetaminophen, 975 mg, Oral, Q8H [facility]  atorvastatin, 40 mg, Oral, Daily With Dinner  dexamethasone, 4 mg, Intravenous, Q6H [facility]  [START ON 9/9/2020] dexamethasone, 2 mg, Oral, Q8H [facility]  [START ON 9/6/2020] dexamethasone, 2 mg, Oral, Q6H [facility]  [START ON 9/12/2020] dexamethasone, 2 mg, Oral, Q12H [facility]  [START ON 9/16/2020] dexamethasone, 2 mg, Oral, Q24H [facility]  [START ON 9/4/2020] dexamethasone, 4 mg, Oral, Q8H [facility]  docusate sodium, 100 mg, Oral, BID  folic acid, 1 mg, Oral, Daily  guaiFENesin, 600 mg, Oral, Q12H [facility]  heparin (porcine), 5,000 Units, Subcutaneous, Q8H [facility]  levETIRAcetam, 500 mg, Intravenous, Q12H [facility]  lisinopril, 10 mg, Oral, Daily  methocarbamol, 500 mg, Oral, Q6H [facility]  multivitamin-minerals, 1 tablet, Oral, Daily  nicotine, 1 patch, Transdermal, Daily  senna-docusate sodium, 2 tablet, Oral, Daily  thiamine, 100 mg, Oral, Daily      Continuous IV Infusions:  niCARdipine, 1-15 mg/hr, Intravenous, Titrated  sodium chloride, 75 mL/hr, Intravenous, Continuous      PRN Make referral to Addiction services  [] Make referral to [facility]  [] Make referral to Spiritual Care Partner  [] No future visits requested        [x] Follow up visits as needed     Comments:  visit for initial spiritual assessment. Patient sitting in chair at bedside playing cards with his wife. Good eye contact, smiling, friendly. Says he is feeling pretty good today. Provided spiritual presence and listening as he spoke briefly about his present thoughts, feelings, and concerns. Did not identify any particular needs at this time. Expressed gratitude for the care provided by [facility] Volunteer saying she was very kind. Appeared encouraged as a result of this visit and expressed gratitude for this visit. Visited by Rev. Manny Daniel MDiv, [facility], [facility]   paging service: [phone]) Meds:  albuterol, 2 puff, Inhalation, Q6H PRN  calcium carbonate, 1,000 mg, Oral, Daily PRN  HYDROmorphone, 0 5 mg, Intravenous, Q1H PRN  Labetalol HCl, 10 mg, Intravenous, Q4H PRN  ondansetron, 4 mg, Intravenous, Q4H PRN  oxyCODONE, 10 mg, Oral, Q4H PRN  oxyCODONE, 5 mg, Oral, Q4H PRN        Discharge Plan: D    Network Utilization Review Department  [email or web address]  ATTENTION: Please call with any questions or concerns to [phone] and carefully listen to the prompts so that you are directed to the right person  All voicemails are confidential   Dodie Sellersy all requests for admission clinical reviews, approved or denied determinations and any other requests to dedicated fax number below belonging to the campus where the patient is receiving treatment   List of dedicated fax numbers for the Facilities:  [address] [place] [address] DENIALS (Administrative/Medical Necessity) [phone]   [address] (Maternity/NICU/Pediatrics) [phone]   [place] [phone]   Ryan Skinenria [phone]   Benjamine Manzo [phone]   Jerrol Balloon [phone]   [address] [place] [address] [phone]   [facility]  [phone]   [address] [facility]  [address] [facility] [address] [facility] [phone]

## 2020-09-17 RX ORDER — SODIUM CHLORIDE 9 MG/ML
25 INJECTION, SOLUTION INTRAVENOUS CONTINUOUS
Status: DISCONTINUED | OUTPATIENT
Start: 2020-09-22 | End: 2020-09-23 | Stop reason: HOSPADM

## 2020-09-17 RX ORDER — ACETAMINOPHEN 325 MG/1
650 TABLET ORAL
Status: DISCONTINUED | OUTPATIENT
Start: 2020-09-22 | End: 2020-09-23 | Stop reason: HOSPADM

## 2020-09-17 RX ORDER — DIPHENHYDRAMINE HYDROCHLORIDE 50 MG/ML
50 INJECTION, SOLUTION INTRAMUSCULAR; INTRAVENOUS
Status: DISCONTINUED | OUTPATIENT
Start: 2020-09-22 | End: 2020-09-23 | Stop reason: HOSPADM

## 2020-09-22 ENCOUNTER — HOSPITAL ENCOUNTER (OUTPATIENT)
Dept: INFUSION THERAPY | Age: 70
Discharge: HOME | End: 2020-09-22
Payer: MEDICARE

## 2020-09-22 VITALS — SYSTOLIC BLOOD PRESSURE: 109 MMHG | DIASTOLIC BLOOD PRESSURE: 63 MMHG | TEMPERATURE: 96.6 F | HEART RATE: 66 BPM

## 2020-09-22 PROCEDURE — 96365 THER/PROPH/DIAG IV INF INIT: CPT

## 2020-09-22 PROCEDURE — 74011250636 HC RX REV CODE- 250/636: Performed by: INTERNAL MEDICINE

## 2020-09-22 RX ADMIN — VEDOLIZUMAB 300 MG: 300 INJECTION, POWDER, LYOPHILIZED, FOR SOLUTION INTRAVENOUS at 13:51

## 2020-09-22 RX ADMIN — SODIUM CHLORIDE 25 ML/HR: 900 INJECTION, SOLUTION INTRAVENOUS at 13:22

## 2020-09-22 NOTE — PROGRESS NOTES
[facility] Short Note                       Date: September 22, 2020      1255 Pt admit to [facility] for [facility] ambulatory in stable condition. Assessment completed. No new concerns voiced. Patient has been receiving this treatment at [facility], transferring care to [facility]. PIV established to left AC, positive blood return. NS @ [facility] initiated. Mr. Bekah Shah vitals were reviewed prior to and after treatment. Patient Vitals for the past 12 hrs:   Temp Pulse BP   09/22/20 1435  66 109/63   09/22/20 1255 (!) 96.6 °F (35.9 °C) 78 127/70       Medications given:   Medications Administered     0.9% sodium chloride infusion     Admin Date  09/22/2020 Action  New Bag Dose  25 mL/hr Rate  25 mL/hr Route  IntraVENous Administered By  Sallie Mac          vedolizumab (ENTYVIO) 300 mg in 0.9% sodium chloride 250 mL, overfill volume 25 mL infusion     Admin Date  09/22/2020 Action  Given Dose  300 mg Rate  560 mL/hr Route  IntraVENous Administered By  [place] Mac                Mr. Joyce Law tolerated the infusion, and had no complaints. PIV flushed per protocol. PIV removed and 2x2 with coban placed. Mr. Joyce Law was discharged from [place] [address] in stable condition at 1440.  Patient is aware of next scheduled [facility] appointment on 11/17/2020 @ Vaughn Hoffmann RN  September 22, 2020

## 2020-11-11 RX ORDER — DIPHENHYDRAMINE HYDROCHLORIDE 50 MG/ML
50 INJECTION, SOLUTION INTRAMUSCULAR; INTRAVENOUS
Status: DISCONTINUED | OUTPATIENT
Start: 2020-11-17 | End: 2020-11-18 | Stop reason: HOSPADM

## 2020-11-11 RX ORDER — SODIUM CHLORIDE 9 MG/ML
25 INJECTION, SOLUTION INTRAVENOUS CONTINUOUS
Status: DISCONTINUED | OUTPATIENT
Start: 2020-11-17 | End: 2020-11-18 | Stop reason: HOSPADM

## 2020-11-11 RX ORDER — ACETAMINOPHEN 325 MG/1
650 TABLET ORAL
Status: DISCONTINUED | OUTPATIENT
Start: 2020-11-17 | End: 2020-11-18 | Stop reason: HOSPADM

## 2020-11-17 ENCOUNTER — HOSPITAL ENCOUNTER (OUTPATIENT)
Dept: INFUSION THERAPY | Age: 70
Discharge: HOME | End: 2020-11-17
Payer: MEDICARE

## 2020-11-17 VITALS
SYSTOLIC BLOOD PRESSURE: 103 MMHG | HEART RATE: 67 BPM | DIASTOLIC BLOOD PRESSURE: 61 MMHG | TEMPERATURE: 97.3 F | RESPIRATION RATE: 16 BRPM

## 2020-11-17 PROCEDURE — 74011000258 HC RX REV CODE- 258: Performed by: INTERNAL MEDICINE

## 2020-11-17 PROCEDURE — 96365 THER/PROPH/DIAG IV INF INIT: CPT

## 2020-11-17 PROCEDURE — 74011250636 HC RX REV CODE- 250/636: Performed by: INTERNAL MEDICINE

## 2020-11-17 RX ADMIN — VEDOLIZUMAB 300 MG: 300 INJECTION, POWDER, LYOPHILIZED, FOR SOLUTION INTRAVENOUS at 13:42

## 2020-11-17 RX ADMIN — SODIUM CHLORIDE 25 ML/HR: 900 INJECTION, SOLUTION INTRAVENOUS at 13:21

## 2020-11-17 NOTE — PROGRESS NOTES
OPIC Short Note                       Date: November 17, 2020      1300 Pt admit to [facility] for q 8 week Entyvio ambulatory in stable condition. Assessment completed. No new concerns voiced. PIV established to left AC, positive blood return. NS @ [facility] Headings initiated. Mr. Maira Yung vitals were reviewed prior to and after treatment. Patient Vitals for the past 12 hrs:   Temp Pulse Resp BP   11/17/20 1425 -- 67 -- 103/61   11/17/20 1256 97.3 °F (36.3 °C) 68 16 113/64       Medications given:   Medications Administered     0.9% sodium chloride infusion     Admin Date  11/17/2020 Action  New Bag Dose  25 mL/hr Rate  25 mL/hr Route  IntraVENous Administered By  Adrianna Galarza RN          vedolizumab (ENTYVIO) 300 mg in 0.9% sodium chloride 250 mL, overfill volume 25 mL infusion     Admin Date  11/17/2020 Action  Given Dose  300 mg Rate  560 mL/hr Route  IntraVENous Administered By  Adrianna Galarza RN                     Mr. Jourdan Alexis tolerated the infusion, and had no complaints. PIV flushed per protocol. PIV removed and 2x2 with coban placed. Mr. Jourdan Alexis was discharged from [place] [address] in stable condition at 1425.  Patient is aware of next scheduled [facility]IC appointment on   Future Appointments   Date Time Provider Anne Mathew   1/12/2021  1:00 PM G1 ABDON [address]   3/9/2021  1:00 PM G1 ABDON [address]   5/4/2021  1:00 PM G1 ABDON [address], AFUA, RN  November 17, 2020

## 2021-01-07 RX ORDER — ACETAMINOPHEN 325 MG/1
650 TABLET ORAL
Status: DISCONTINUED | OUTPATIENT
Start: 2021-01-12 | End: 2021-01-13 | Stop reason: HOSPADM

## 2021-01-07 RX ORDER — SODIUM CHLORIDE 9 MG/ML
25 INJECTION, SOLUTION INTRAVENOUS CONTINUOUS
Status: DISCONTINUED | OUTPATIENT
Start: 2021-01-12 | End: 2021-01-13 | Stop reason: HOSPADM

## 2021-01-07 RX ORDER — DIPHENHYDRAMINE HYDROCHLORIDE 50 MG/ML
50 INJECTION, SOLUTION INTRAMUSCULAR; INTRAVENOUS
Status: DISCONTINUED | OUTPATIENT
Start: 2021-01-12 | End: 2021-01-13 | Stop reason: HOSPADM

## 2021-01-12 ENCOUNTER — HOSPITAL ENCOUNTER (OUTPATIENT)
Dept: INFUSION THERAPY | Age: 71
Discharge: HOME | End: 2021-01-12
Payer: MEDICARE

## 2021-01-12 VITALS
HEART RATE: 68 BPM | TEMPERATURE: 97.3 F | RESPIRATION RATE: 16 BRPM | DIASTOLIC BLOOD PRESSURE: 75 MMHG | SYSTOLIC BLOOD PRESSURE: 127 MMHG

## 2021-01-12 PROCEDURE — 96365 THER/PROPH/DIAG IV INF INIT: CPT

## 2021-01-12 PROCEDURE — 74011250636 HC RX REV CODE- 250/636: Performed by: INTERNAL MEDICINE

## 2021-01-12 PROCEDURE — 74011000258 HC RX REV CODE- 258: Performed by: INTERNAL MEDICINE

## 2021-01-12 RX ADMIN — SODIUM CHLORIDE 25 ML/HR: 900 INJECTION, SOLUTION INTRAVENOUS at 13:40

## 2021-01-12 RX ADMIN — VEDOLIZUMAB 300 MG: 300 INJECTION, POWDER, LYOPHILIZED, FOR SOLUTION INTRAVENOUS at 13:45

## 2021-01-12 NOTE — PROGRESS NOTES
OPIC Short Note                       Date: 2021    Name: Mik Kumar    MRN: [id number]         : 1950      1245 Pt admit to [facility] for [facility] ambulatory in stable condition. Assessment completed. No new concerns voiced. Peripheral IV established in left AC with good blood return. PIV connected to NS KVO. Patient denies SOB, fever, cough, and/or general not feeling well. Patient denies recent exposure to someone who has tested positive for COVID-19. Patient denies contact with anyone who has a pending COVID test.     Mr. Last Villegas vitals were reviewed prior to and after treatment. Patient Vitals for the past 12 hrs:   Temp Pulse Resp BP   21 1421  68  127/75   21 1245 97.3 °F (36.3 °C) 72 16 125/72       Medications Administered     0.9% sodium chloride infusion     Admin Date  2021 Action  New Bag Dose  25 mL/hr Rate  25 mL/hr Route  IntraVENous Administered By  Corrinne Haring, RN          vedolizumab (ENTYVIO) 300 mg in 0.9% sodium chloride 250 mL, overfill volume 25 mL infusion     Admin Date  2021 Action  Given Dose  300 mg Rate  560 mL/hr Route  IntraVENous Administered By  Corrinne Haring, RN              Mr. Anastasia Willoughby tolerated the infusion, and had no complaints. PIV removed without difficulty. Mr. Anastasia Willoughby was discharged from [place] [address] in stable condition at [phone]. Patient is aware of next appointment on 3/9/21.      Future Appointments   Date Time Provider Anne Mathew   3/9/2021  1:00 PM [facility]   2021  1:00 PM G1 ABDON [address] Tracey RN  2021  6:13 PM

## 2021-03-04 RX ORDER — SODIUM CHLORIDE 9 MG/ML
25 INJECTION, SOLUTION INTRAVENOUS CONTINUOUS
Status: DISCONTINUED | OUTPATIENT
Start: 2021-03-09 | End: 2021-03-10 | Stop reason: HOSPADM

## 2021-03-04 RX ORDER — DIPHENHYDRAMINE HYDROCHLORIDE 50 MG/ML
50 INJECTION, SOLUTION INTRAMUSCULAR; INTRAVENOUS
Status: DISCONTINUED | OUTPATIENT
Start: 2021-03-09 | End: 2021-03-10 | Stop reason: HOSPADM

## 2021-03-04 RX ORDER — ACETAMINOPHEN 325 MG/1
650 TABLET ORAL
Status: DISCONTINUED | OUTPATIENT
Start: 2021-03-09 | End: 2021-03-10 | Stop reason: HOSPADM

## 2021-03-09 ENCOUNTER — HOSPITAL ENCOUNTER (OUTPATIENT)
Dept: INFUSION THERAPY | Age: 71
Discharge: HOME | End: 2021-03-09
Payer: MEDICARE

## 2021-03-09 VITALS — TEMPERATURE: 97.1 F | SYSTOLIC BLOOD PRESSURE: 133 MMHG | DIASTOLIC BLOOD PRESSURE: 70 MMHG | HEART RATE: 71 BPM

## 2021-03-09 PROCEDURE — 74011250636 HC RX REV CODE- 250/636: Performed by: INTERNAL MEDICINE

## 2021-03-09 PROCEDURE — 96365 THER/PROPH/DIAG IV INF INIT: CPT

## 2021-03-09 RX ADMIN — SODIUM CHLORIDE 25 ML/HR: 900 INJECTION, SOLUTION INTRAVENOUS at 14:47

## 2021-03-09 RX ADMIN — VEDOLIZUMAB 300 MG: 300 INJECTION, POWDER, LYOPHILIZED, FOR SOLUTION INTRAVENOUS at 14:45

## 2021-03-09 NOTE — PROGRESS NOTES
OPIC Short Note                       Date: 2021    Name: Dana Wang    MRN: [id number]         : 1950      1400 Pt admit to [facility] for [facility] ambulatory in stable condition. Assessment completed. No new concerns voiced. Peripheral IV established in left AC with good blood return. PIV connected to NS KVO. Patient denies SOB, fever, cough, and/or general not feeling well. Patient denies recent exposure to someone who has tested positive for COVID-19. Patient denies contact with anyone who has a pending COVID test.     Mr. Nicholas Castellanos vitals were reviewed prior to and after treatment. Patient Vitals for the past 12 hrs:   Temp Pulse BP   21 1346 97.1 °F (36.2 °C) 71 133/70     Medications Administered     0.9% sodium chloride infusion     Admin Date  2021 Action  New Bag Dose  25 mL/hr Rate  25 mL/hr Route  IntraVENous Administered By  Christopher Layton RN          vedolizumab (ENTYVIO) 300 mg in 0.9% sodium chloride 250 mL, overfill volume 25 mL infusion     Admin Date  2021 Action  Given Dose  300 mg Rate  560 mL/hr Route  IntraVENous Administered By  Christopher Layton RN                Mr. Rl Calvin tolerated the infusion, and had no complaints. PIV removed without difficulty. Mr. Rl Calvin was discharged from [place] [address] in stable condition at .  Patient is aware of next appointment on     Future Appointments   Date Time Provider Anne Mathew   2021  1:00 PM [address], RN  2021

## 2021-03-25 ENCOUNTER — TRANSCRIBE ORDER (OUTPATIENT)
Dept: SCHEDULING | Age: 71
End: 2021-03-25

## 2021-03-25 DIAGNOSIS — E78.5 HYPERLIPEMIA: Primary | ICD-10-CM

## 2021-03-25 DIAGNOSIS — N18.31 CHRONIC KIDNEY DISEASE (CKD) STAGE G3A/A1, MODERATELY DECREASED GLOMERULAR FILTRATION RATE (GFR) BETWEEN 45-59 ML/MIN/1.73 SQUARE METER AND ALBUMINURIA CREATININE RATIO LESS THAN 30 MG/G (HCC): ICD-10-CM

## 2021-03-25 DIAGNOSIS — K50.90 CROHN DISEASE (HCC): ICD-10-CM

## 2021-03-29 ENCOUNTER — HOSPITAL ENCOUNTER (OUTPATIENT)
Dept: CT IMAGING | Age: 71
Discharge: HOME | End: 2021-03-29
Attending: INTERNAL MEDICINE
Payer: MEDICARE

## 2021-03-29 DIAGNOSIS — E78.5 HYPERLIPEMIA: ICD-10-CM

## 2021-03-29 DIAGNOSIS — N18.31 CHRONIC KIDNEY DISEASE (CKD) STAGE G3A/A1, MODERATELY DECREASED GLOMERULAR FILTRATION RATE (GFR) BETWEEN 45-59 ML/MIN/1.73 SQUARE METER AND ALBUMINURIA CREATININE RATIO LESS THAN 30 MG/G (HCC): ICD-10-CM

## 2021-03-29 DIAGNOSIS — K50.90 CROHN DISEASE (HCC): ICD-10-CM

## 2021-03-29 PROCEDURE — 74011000636 HC RX REV CODE- 636: Performed by: INTERNAL MEDICINE

## 2021-03-29 PROCEDURE — 74176 CT ABD & PELVIS W/O CONTRAST: CPT

## 2021-03-29 RX ADMIN — IOHEXOL 50 ML: 240 INJECTION, SOLUTION INTRATHECAL; INTRAVASCULAR; INTRAVENOUS; ORAL at 12:34

## 2021-05-04 ENCOUNTER — APPOINTMENT (OUTPATIENT)
Dept: INFUSION THERAPY | Age: 71
End: 2021-05-04

## 2021-08-19 RX ORDER — ACETAMINOPHEN 325 MG/1
650 TABLET ORAL
Status: DISCONTINUED | OUTPATIENT
Start: 2021-08-24 | End: 2021-08-25 | Stop reason: HOSPADM

## 2021-08-19 RX ORDER — DIPHENHYDRAMINE HYDROCHLORIDE 50 MG/ML
50 INJECTION, SOLUTION INTRAMUSCULAR; INTRAVENOUS
Status: DISCONTINUED | OUTPATIENT
Start: 2021-08-24 | End: 2021-08-25 | Stop reason: HOSPADM

## 2021-08-19 RX ORDER — SODIUM CHLORIDE 9 MG/ML
25 INJECTION, SOLUTION INTRAVENOUS CONTINUOUS
Status: DISCONTINUED | OUTPATIENT
Start: 2021-08-24 | End: 2021-08-25 | Stop reason: HOSPADM

## 2021-08-24 ENCOUNTER — HOSPITAL ENCOUNTER (OUTPATIENT)
Dept: INFUSION THERAPY | Age: 71
Discharge: HOME | End: 2021-08-24
Payer: MEDICARE

## 2021-08-24 VITALS
DIASTOLIC BLOOD PRESSURE: 70 MMHG | RESPIRATION RATE: 16 BRPM | HEART RATE: 68 BPM | SYSTOLIC BLOOD PRESSURE: 123 MMHG | TEMPERATURE: 97 F

## 2021-08-24 PROCEDURE — 96365 THER/PROPH/DIAG IV INF INIT: CPT

## 2021-08-24 PROCEDURE — 74011250636 HC RX REV CODE- 250/636: Performed by: INTERNAL MEDICINE

## 2021-08-24 RX ADMIN — SODIUM CHLORIDE 25 ML/HR: 900 INJECTION, SOLUTION INTRAVENOUS at 15:33

## 2021-08-24 RX ADMIN — VEDOLIZUMAB 300 MG: 300 INJECTION, POWDER, LYOPHILIZED, FOR SOLUTION INTRAVENOUS at 15:34

## 2021-08-24 NOTE — PROGRESS NOTES
hospitals Progress Note    Date: 2021    Name: Edel Ribeiro    MRN: [id number]         : 1950        1500: Pt arrived ambulatory to [facility] for Entyvio in stable condition. Assessment completed. No other complaints voiced at this time. Peripheral IV estalbihed with positive blood return. Normal Saline started at [facility]. Patient denies SOB, fever, cough, general not feeling well. Patient denies recent exposure to someone who has tested positive for COVID-19. Patient denies having contact with anyone who has a pending COVID test.      Patient Vitals for the past 12 hrs:   Temp Pulse Resp BP   21 1500 97 °F (36.1 °C) 68 16 123/70         Medications Administered     0.9% sodium chloride infusion     Admin Date  2021 Action  New Bag Dose  25 mL/hr Rate  25 mL/hr Route  IntraVENous Administered By  Memo Germain RN          vedolizumab (ENTYVIO) 300 mg in 0.9% sodium chloride 250 mL, overfill volume 25 mL infusion     Admin Date  2021 Action  New Bag Dose  300 mg Rate  560 mL/hr Route  IntraVENous Administered By  Memo Germain RN                  8310: Tolerated treatment well, no adverse reactions noted. D/Cd from [facility] ambulatory and in no distress.       Future Appointments   Date Time Provider Anne Mathew   10/19/2021  3:00 PM F4 ABDON MED [address] [place] [address]   2021  3:00 PM F4 ABDON MED [address] [place] [address]   2022  3:00 PM F4 ABDON MED [address], RN  2021

## 2021-10-18 RX ORDER — ACETAMINOPHEN 325 MG/1
650 TABLET ORAL
Status: DISCONTINUED | OUTPATIENT
Start: 2021-10-19 | End: 2021-10-20 | Stop reason: HOSPADM

## 2021-10-18 RX ORDER — SODIUM CHLORIDE 9 MG/ML
25 INJECTION, SOLUTION INTRAVENOUS CONTINUOUS
Status: DISCONTINUED | OUTPATIENT
Start: 2021-10-19 | End: 2021-10-20 | Stop reason: HOSPADM

## 2021-10-18 RX ORDER — DIPHENHYDRAMINE HYDROCHLORIDE 50 MG/ML
50 INJECTION, SOLUTION INTRAMUSCULAR; INTRAVENOUS
Status: DISCONTINUED | OUTPATIENT
Start: 2021-10-19 | End: 2021-10-20 | Stop reason: HOSPADM

## 2021-10-19 ENCOUNTER — HOSPITAL ENCOUNTER (OUTPATIENT)
Dept: INFUSION THERAPY | Age: 71
Discharge: HOME | End: 2021-10-19
Payer: MEDICARE

## 2021-10-19 VITALS
TEMPERATURE: 96.4 F | HEART RATE: 62 BPM | RESPIRATION RATE: 16 BRPM | SYSTOLIC BLOOD PRESSURE: 135 MMHG | DIASTOLIC BLOOD PRESSURE: 70 MMHG

## 2021-10-19 PROCEDURE — 74011250636 HC RX REV CODE- 250/636: Performed by: INTERNAL MEDICINE

## 2021-10-19 PROCEDURE — 96365 THER/PROPH/DIAG IV INF INIT: CPT

## 2021-10-19 RX ADMIN — SODIUM CHLORIDE 25 ML/HR: 900 INJECTION, SOLUTION INTRAVENOUS at 15:05

## 2021-10-19 RX ADMIN — VEDOLIZUMAB 300 MG: 300 INJECTION, POWDER, LYOPHILIZED, FOR SOLUTION INTRAVENOUS at 16:07

## 2021-10-19 NOTE — PROGRESS NOTES
OPIC Short Note                   1500 Pt admit to [facility] for Entyvio ambulatory in stable condition. Assessment completed. No new concerns voiced. PIV established in [facility] with good blood return. Mr. Anson Lee vitals were reviewed prior to and after treatment. Patient Vitals for the past 12 hrs:   Temp Pulse Resp BP   10/19/21 1648  62  135/70   10/19/21 1503 (!) 96.4 °F (35.8 °C) 67 16 128/65       Medications Administered     0.9% sodium chloride infusion     Admin Date  10/19/2021 Action  New Bag Dose  25 mL/hr Rate  25 mL/hr Route  IntraVENous Administered By  Jose Liu RN          vedolizumab (ENTYVIO) 300 mg in 0.9% sodium chloride 250 mL, overfill volume 25 mL infusion     Admin Date  10/19/2021 Action  New Bag Dose  300 mg Rate  560 mL/hr Route  IntraVENous Administered By  Jose Liu RN              Mr. Familia Castaneda tolerated the infusion, and had no complaints. Line flushed following infusion. PIV removed without difficulty. Mr. Familia Castaneda was discharged from [place] [address] in stable condition. Patient is aware of next appointment.     Future Appointments   Date Time Provider Anne Mathew   12/14/2021  3:00 PM F4 [address]   2/8/2022  3:00 PM F4 ABDON MED [facility] ALEYDA Tracey RN  October 19, 2021  5:21 PM

## 2021-12-07 RX ORDER — ACETAMINOPHEN 325 MG/1
650 TABLET ORAL
Status: DISCONTINUED | OUTPATIENT
Start: 2021-12-14 | End: 2021-12-15 | Stop reason: HOSPADM

## 2021-12-07 RX ORDER — DIPHENHYDRAMINE HYDROCHLORIDE 50 MG/ML
50 INJECTION, SOLUTION INTRAMUSCULAR; INTRAVENOUS
Status: DISCONTINUED | OUTPATIENT
Start: 2021-12-14 | End: 2021-12-15 | Stop reason: HOSPADM

## 2021-12-07 RX ORDER — SODIUM CHLORIDE 9 MG/ML
25 INJECTION, SOLUTION INTRAVENOUS CONTINUOUS
Status: DISCONTINUED | OUTPATIENT
Start: 2021-12-14 | End: 2021-12-15 | Stop reason: HOSPADM

## 2021-12-14 ENCOUNTER — HOSPITAL ENCOUNTER (OUTPATIENT)
Dept: INFUSION THERAPY | Age: 71
Discharge: HOME | End: 2021-12-14
Payer: MEDICARE

## 2021-12-14 VITALS
SYSTOLIC BLOOD PRESSURE: 129 MMHG | RESPIRATION RATE: 16 BRPM | HEART RATE: 67 BPM | TEMPERATURE: 96.8 F | DIASTOLIC BLOOD PRESSURE: 77 MMHG

## 2021-12-14 PROCEDURE — 96413 CHEMO IV INFUSION 1 HR: CPT

## 2021-12-14 PROCEDURE — 74011250636 HC RX REV CODE- 250/636: Performed by: INTERNAL MEDICINE

## 2021-12-14 RX ADMIN — VEDOLIZUMAB 300 MG: 300 INJECTION, POWDER, LYOPHILIZED, FOR SOLUTION INTRAVENOUS at 15:30

## 2021-12-14 RX ADMIN — SODIUM CHLORIDE 25 ML/HR: 900 INJECTION, SOLUTION INTRAVENOUS at 15:25

## 2021-12-14 NOTE — PROGRESS NOTES
[facility] Progress Note    Date: 2021    Name: Sam Maldonado    MRN: [id number]         : 1950    Mr. Adrian Dawson Arrived ambulatory and in no distress for Modesta Moncada. Assessment was completed, no acute issues at this time, no new complaints voiced. 24G  PIV placed in left AC, positive blood return noted. nO labs drawn today. Mr. Marcio Argueta vitals were reviewed. Patient Vitals for the past 12 hrs:   Temp Pulse Resp BP   21 1608  67 16 129/77   21 1457 96.8 °F (36 °C) 74 16 128/78     Medications:  Medications Administered     0.9% sodium chloride infusion     Admin Date  2021 Action  New Bag Dose  25 mL/hr Rate  25 mL/hr Route  IntraVENous Administered By  Quang Pabon RN          vedolizumab (ENTYVIO) 300 mg in 0.9% sodium chloride 250 mL, overfill volume 25 mL infusion     Admin Date  2021 Action  New Bag Dose  300 mg Rate  560 mL/hr Route  IntraVENous Administered By  Quang Pabon RN              Flushed with 30mL NS at completion. Mr. Adrian Dawson tolerated treatment well and was discharged from [place] [address] in stable condition. PIV flushed and removed. He is to return on  for his next appointment.     Ion Solitario RN  2021

## 2022-02-08 ENCOUNTER — APPOINTMENT (OUTPATIENT)
Dept: INFUSION THERAPY | Age: 72
End: 2022-02-08

## 2022-03-18 PROBLEM — K50.90 EXACERBATION OF CROHN'S DISEASE (HCC): Status: ACTIVE | Noted: 2019-08-16

## 2022-03-19 PROBLEM — N17.9 AKI (ACUTE KIDNEY INJURY) (HCC): Status: ACTIVE | Noted: 2019-08-17

## 2022-03-19 PROBLEM — K56.609 SMALL BOWEL OBSTRUCTION (HCC): Status: ACTIVE | Noted: 2019-08-17

## 2022-03-29 RX ORDER — SODIUM CHLORIDE 9 MG/ML
25 INJECTION, SOLUTION INTRAVENOUS CONTINUOUS
Status: DISPENSED | OUTPATIENT
Start: 2022-04-05 | End: 2022-04-05

## 2022-03-29 RX ORDER — ACETAMINOPHEN 325 MG/1
650 TABLET ORAL
Status: ACTIVE | OUTPATIENT
Start: 2022-04-05 | End: 2022-04-05

## 2022-03-29 RX ORDER — DIPHENHYDRAMINE HYDROCHLORIDE 50 MG/ML
50 INJECTION, SOLUTION INTRAMUSCULAR; INTRAVENOUS
Status: ACTIVE | OUTPATIENT
Start: 2022-04-05 | End: 2022-04-05

## 2022-04-05 ENCOUNTER — HOSPITAL ENCOUNTER (OUTPATIENT)
Dept: INFUSION THERAPY | Age: 72
Discharge: HOME | End: 2022-04-05
Payer: MEDICARE

## 2022-04-05 VITALS
RESPIRATION RATE: 16 BRPM | SYSTOLIC BLOOD PRESSURE: 123 MMHG | DIASTOLIC BLOOD PRESSURE: 64 MMHG | TEMPERATURE: 97.8 F | HEART RATE: 61 BPM

## 2022-04-05 PROCEDURE — 74011250636 HC RX REV CODE- 250/636: Performed by: INTERNAL MEDICINE

## 2022-04-05 PROCEDURE — 96365 THER/PROPH/DIAG IV INF INIT: CPT

## 2022-04-05 RX ADMIN — VEDOLIZUMAB 300 MG: 300 INJECTION, POWDER, LYOPHILIZED, FOR SOLUTION INTRAVENOUS at 16:36

## 2022-04-05 NOTE — PROGRESS NOTES
[facility] Peds/Adult Note                       Date: 2022    Name: Shahbaz Balderas    MRN: [id number]         : 1950    1445 Patient arrives for [facility] without acute problems. Please see [facility] for complete assessment and education provided. Prior to treatment, patient was screened for COVID 19. Patient denies any signs or symptoms of COVID. Denies any known physical contact with anyone diagnosed with, or with pending or positive COVID test. Denies a pending or positive COVID test himself. Vital signs stable throughout and prior to discharge. Patient tolerated procedure well and was discharged without incident. Patient is aware of next [facility] appointment on 2022 Appointment card give to the Patient      Mr. Arizmendi's vitals were reviewed prior to and after treatment. Patient Vitals for the past 12 hrs:   Temp Pulse Resp BP   22 1711  61 16 123/64   22 1501 97.8 °F (36.6 °C) 65 16 124/73         Lab results were obtained and reviewed. No results found for this or any previous visit (from the past 12 hour(s)). Medications given:   Medications Administered     vedolizumab (ENTYVIO) 300 mg in 0.9% sodium chloride 250 mL, overfill volume 25 mL infusion     Admin Date  2022 Action  New Bag Dose  300 mg Rate  560 mL/hr Route  IntraVENous Administered By  Whitney Bucio RN                Mr. Jaime Laws tolerated the infusion, and had no complaints. PIV flushed and removed per protocol. Mr. Jaime Laws was discharged from [place] [address] in stable condition. Discharge Instructions provided to patient, patient verbalized understanding but denied the request for a copy of d/c instructions.      Henry Blanco RN  2022  5:21 PM

## 2022-04-28 ENCOUNTER — HOSPITAL ENCOUNTER (OUTPATIENT)
Dept: GENERAL RADIOLOGY | Age: 72
Discharge: HOME | End: 2022-04-28
Attending: FAMILY MEDICINE
Payer: MEDICARE

## 2022-04-28 ENCOUNTER — TRANSCRIBE ORDER (OUTPATIENT)
Dept: GENERAL RADIOLOGY | Age: 72
End: 2022-04-28

## 2022-04-28 DIAGNOSIS — M54.50 LOW BACK PAIN: ICD-10-CM

## 2022-04-28 DIAGNOSIS — M54.50 LOW BACK PAIN: Primary | ICD-10-CM

## 2022-04-28 PROCEDURE — 72100 X-RAY EXAM L-S SPINE 2/3 VWS: CPT

## 2022-05-23 RX ORDER — ACETAMINOPHEN 325 MG/1
650 TABLET ORAL
Status: ACTIVE | OUTPATIENT
Start: 2022-05-31 | End: 2022-05-31

## 2022-05-23 RX ORDER — SODIUM CHLORIDE 9 MG/ML
25 INJECTION, SOLUTION INTRAVENOUS CONTINUOUS
Status: DISPENSED | OUTPATIENT
Start: 2022-05-31 | End: 2022-05-31

## 2022-05-23 RX ORDER — DIPHENHYDRAMINE HYDROCHLORIDE 50 MG/ML
50 INJECTION, SOLUTION INTRAMUSCULAR; INTRAVENOUS
Status: ACTIVE | OUTPATIENT
Start: 2022-05-31 | End: 2022-05-31

## 2022-05-31 ENCOUNTER — HOSPITAL ENCOUNTER (OUTPATIENT)
Dept: INFUSION THERAPY | Age: 72
Discharge: HOME | End: 2022-05-31
Payer: MEDICARE

## 2022-05-31 VITALS
RESPIRATION RATE: 18 BRPM | DIASTOLIC BLOOD PRESSURE: 72 MMHG | TEMPERATURE: 97.3 F | SYSTOLIC BLOOD PRESSURE: 135 MMHG | HEART RATE: 70 BPM

## 2022-05-31 PROCEDURE — 96365 THER/PROPH/DIAG IV INF INIT: CPT

## 2022-05-31 PROCEDURE — 74011250636 HC RX REV CODE- 250/636: Performed by: INTERNAL MEDICINE

## 2022-05-31 RX ADMIN — VEDOLIZUMAB 300 MG: 300 INJECTION, POWDER, LYOPHILIZED, FOR SOLUTION INTRAVENOUS at 13:42

## 2022-05-31 RX ADMIN — SODIUM CHLORIDE 25 ML/HR: 9 INJECTION, SOLUTION INTRAVENOUS at 13:30

## 2022-05-31 NOTE — PROGRESS NOTES
OPIC Progress Note    Date: May 31, 2022        1245: Pt arrived ambulatory to [facility] for Entyvio in stable condition. Assessment completed. PIV placed to left forearm with positive blood return. Criteria for treatment was met. Patient Vitals for the past 12 hrs:   Temp Pulse Resp BP   05/31/22 1244 97.3 °F (36.3 °C) 70 18 135/72       Medications Administered     0.9% sodium chloride infusion     Admin Date  05/31/2022 Action  New Bag Dose  25 mL/hr Rate  25 mL/hr Route  IntraVENous Administered By  Nithya Castro          vedolizumab (ENTYVIO) 300 mg in 0.9% sodium chloride 250 mL, overfill volume 25 mL infusion     Admin Date  05/31/2022 Action  New Bag Dose  300 mg Rate  560 mL/hr Route  IntraVENous Administered By  Nithya Castro                1420: Tolerated treatment well, no adverse reactions noted. PIV flushed and removed. 2x2 and coban placed. D/Cd from [facility] ambulatory and in no distress. Patient is aware of next scheduled [facility] appointment on 7/26/22.     Future Appointments   Date Time Provider Anne Mathew   7/26/2022  1:00 PM D4 ABDON MED [address]   9/20/2022  1:00 PM D4 ABDON MED [address]   11/15/2022  1:00 PM D4 ABDON MED [address]           Calos Herzog RN  May 31, 2022

## 2022-07-19 RX ORDER — SODIUM CHLORIDE 9 MG/ML
25 INJECTION, SOLUTION INTRAVENOUS CONTINUOUS
Status: DISCONTINUED | OUTPATIENT
Start: 2022-07-26 | End: 2022-07-27 | Stop reason: HOSPADM

## 2022-07-19 RX ORDER — ACETAMINOPHEN 325 MG/1
650 TABLET ORAL
Status: DISCONTINUED | OUTPATIENT
Start: 2022-07-26 | End: 2022-07-27 | Stop reason: HOSPADM

## 2022-07-19 RX ORDER — DIPHENHYDRAMINE HYDROCHLORIDE 50 MG/ML
50 INJECTION, SOLUTION INTRAMUSCULAR; INTRAVENOUS
Status: DISCONTINUED | OUTPATIENT
Start: 2022-07-26 | End: 2022-07-27 | Stop reason: HOSPADM

## 2022-07-26 ENCOUNTER — HOSPITAL ENCOUNTER (OUTPATIENT)
Dept: INFUSION THERAPY | Age: 72
Discharge: HOME | End: 2022-07-26
Payer: MEDICARE

## 2022-07-26 VITALS
DIASTOLIC BLOOD PRESSURE: 58 MMHG | SYSTOLIC BLOOD PRESSURE: 129 MMHG | TEMPERATURE: 98.5 F | RESPIRATION RATE: 18 BRPM | HEART RATE: 68 BPM

## 2022-07-26 PROCEDURE — 74011250636 HC RX REV CODE- 250/636: Performed by: INTERNAL MEDICINE

## 2022-07-26 PROCEDURE — 96365 THER/PROPH/DIAG IV INF INIT: CPT

## 2022-07-26 RX ORDER — ATORVASTATIN CALCIUM 40 MG/1
40 TABLET, FILM COATED ORAL DAILY
COMMUNITY

## 2022-07-26 RX ADMIN — VEDOLIZUMAB 300 MG: 300 INJECTION, POWDER, LYOPHILIZED, FOR SOLUTION INTRAVENOUS at 13:40

## 2022-07-26 RX ADMIN — SODIUM CHLORIDE 25 ML/HR: 900 INJECTION, SOLUTION INTRAVENOUS at 13:30

## 2022-07-26 NOTE — PROGRESS NOTES
OPIC Progress Note    Date: July 26, 2022        1255: Pt arrived ambulatory to [facility] for [facility] in stable condition. Assessment completed. PIV placed to right upper forearm with positive blood return. Criteria for treatment was met. Patient Vitals for the past 12 hrs:   Temp Pulse Resp BP   07/26/22 1418 -- 68 -- (!) 129/58   07/26/22 1255 98.5 °F (36.9 °C) 70 18 131/69       Medications Administered       0.9% sodium chloride infusion       Admin Date  07/26/2022 Action  New Bag Dose  25 mL/hr Rate  25 mL/hr Route  IntraVENous Administered By  Levell Crumb              vedolizumab (ENTYVIO) 300 mg in 0.9% sodium chloride 250 mL, overfill volume 25 mL infusion       Admin Date  07/26/2022 Action  New Bag Dose  300 mg Rate  560 mL/hr Route  IntraVENous Administered By  Levell Crumb                    1420: Tolerated treatment well, no adverse reactions noted. PIV flushed and removed. 2x2 and coban placed. D/Cd from [facility] ambulatory and in no distress. Patient is aware of next scheduled [facility] appointment on 9/20/22.     Future Appointments   Date Time Provider Anne Mathew   9/20/2022  1:00 PM D4 [facility] [place] [facility]   11/15/2022  1:00 PM D4 ABDON MED [address] [facility]           Elisabeth Portillo RN  July 26, 2022

## 2022-09-12 RX ORDER — DIPHENHYDRAMINE HYDROCHLORIDE 50 MG/ML
50 INJECTION, SOLUTION INTRAMUSCULAR; INTRAVENOUS
Status: ACTIVE | OUTPATIENT
Start: 2022-09-20 | End: 2022-09-20

## 2022-09-12 RX ORDER — SODIUM CHLORIDE 9 MG/ML
25 INJECTION, SOLUTION INTRAVENOUS CONTINUOUS
Status: DISPENSED | OUTPATIENT
Start: 2022-09-20 | End: 2022-09-20

## 2022-09-12 RX ORDER — ACETAMINOPHEN 325 MG/1
650 TABLET ORAL
Status: ACTIVE | OUTPATIENT
Start: 2022-09-20 | End: 2022-09-20

## 2022-09-20 ENCOUNTER — HOSPITAL ENCOUNTER (OUTPATIENT)
Dept: INFUSION THERAPY | Age: 72
Discharge: HOME | End: 2022-09-20
Payer: MEDICARE

## 2022-09-20 VITALS
RESPIRATION RATE: 17 BRPM | TEMPERATURE: 97.5 F | SYSTOLIC BLOOD PRESSURE: 125 MMHG | HEART RATE: 59 BPM | DIASTOLIC BLOOD PRESSURE: 66 MMHG

## 2022-09-20 PROCEDURE — 74011250636 HC RX REV CODE- 250/636: Performed by: INTERNAL MEDICINE

## 2022-09-20 PROCEDURE — 96365 THER/PROPH/DIAG IV INF INIT: CPT

## 2022-09-20 RX ADMIN — SODIUM CHLORIDE 25 ML/HR: 900 INJECTION, SOLUTION INTRAVENOUS at 13:11

## 2022-09-20 RX ADMIN — VEDOLIZUMAB 300 MG: 300 INJECTION, POWDER, LYOPHILIZED, FOR SOLUTION INTRAVENOUS at 13:39

## 2022-09-20 NOTE — PROGRESS NOTES
Outpatient Infusion Center Progress Note    7032 Pt admit to [facility] for Entyvio infusion ambulatory in stable condition. Assessment completed. No new concerns voiced. PIV established in right ac with good blood return. Visit Vitals  /66   Pulse (!) 59   Temp 97.5 °F (36.4 °C)   Resp 17       Medications:  Medications Administered       0.9% sodium chloride infusion       Admin Date  09/20/2022 Action  New Bag Dose  25 mL/hr Rate  25 mL/hr Route  IntraVENous Administered By  Jere Mi RN              vedolizumab (ENTYVIO) 300 mg in 0.9% sodium chloride 250 mL, overfill volume 25 mL infusion       Admin Date  09/20/2022 Action  New Bag Dose  300 mg Rate  560 mL/hr Route  IntraVENous Administered By  Jere Mi, RN                     3849 Pt tolerated treatment well. PIV removed per protocol at end of infusion. D/c home ambulatory in no distress. Pt aware of next appointment scheduled for 11/15/22.

## 2022-09-21 NOTE — PROGRESS NOTES
Body Location Override (Optional - Billing Will Still Be Based On Selected Body Map Location If Applicable): Left proximal posterior upper arm Cardiology Office Note     Subjective:      Yosef Taylor is a 75 y.o. male patient who had been seen for evaluation of excessive diaphoresis with minimal exertion and HERNÁNDEZ in the late summer/fall. Referred by Dr Deepak Andrade. The excessive perspiration started this summer. He says with just standing outside and doing minimal activity he will is \"soaked\". He has chronic HERNÁNDEZ d/t COPD  He had seen Dr Shilpa Foreman and had non contrast chest CT in March 2017, I do not have result. He told me the nodules did not change from 2010  In 2010 he had chest CT with lung nodules and left adrenal adenoma  He smoked cigarettes 1 ppdx 40 years, he quit 10 years ago. PMH includes COPD, Crohn's disease, hyperlipidemia. 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 . Grown children   Family hx: No family hx of early CAD. Father unstable angina- no reported CAD. No hx of ppm/ICD. No SCD. Patient Active Problem List   Diagnosis Code    Hyperlipidemia E78.5    [facility] K50.90    Encounter for long-term (current) drug use Z79.899    COPD (chronic obstructive pulmonary disease) ([facility]) J44.9     Current Outpatient Prescriptions   Medication Sig Dispense Refill    folic acid 789 mcg tablet Take 800 mcg by mouth daily.  simvastatin (ZOCOR) 40 mg tablet TAKE 1 TABLET DAILY 90 Tab 3    DELZICOL 400 mg cpDR DR capsule TAKE 2 CAPSULES THREE TIMES A  Cap 3    SPIRIVA WITH HANDIHALER 18 mcg inhalation capsule INHALE THE CONTENTS OF 1 CAPSULE ONCE DAILY AS DIRECTED IN THE PACKAGE 90 Cap 3    buPROPion SR (WELLBUTRIN SR) 150 mg SR tablet TAKE 1 TABLET TWICE A  Tab 1    FLUoxetine (PROZAC) 20 mg capsule TAKE 1 CAPSULE BY MOUTH DAILY (Patient taking differently: 40 mg daily.  TAKE 1 CAPSULE BY MOUTH DAILY) 90 Cap 3    cyclobenzaprine (FLEXERIL) 10 mg tablet TAKE 1 TABLET 3 TIMES A DAY AS NEEDED MUSCLE SPASM 20 Tab 0     Allergies   Allergen Reactions    Sulfa (Sulfonamide Antibiotics) Unknown (comments)     Past Medical History:   Diagnosis Date    [facility]chronic obstructive pulmonary disease) [facility]     Crohn's disease ([facility]     Depression     Elevated lipids     Hyperlipidemia 8/30/2011     Past Surgical History:   Procedure Laterality Date    ENDOSCOPY, COLON, DIAGNOSTIC  2006    Rep 5 yrs    ENDOSCOPY, COLON, DIAGNOSTIC  2/2012    OK. Rep 5 yrs.  HX SMALL BOWEL RESECTION  about 1990    For Crohn's     Family History   Problem Relation Age of Onset    Kidney Disease Mother     Cancer Mother     Parkinsonism Father     Dementia Father     Heart Disease Father      Social History   Substance Use Topics    Smoking status: Former Smoker     Quit date: 1/1/2008    Smokeless tobacco: Never Used    Alcohol use Yes      Comment: social alcohol        Review of Systems:   Constitutional: Negative for fever, chills, weight loss, +malaise/fatigue. HEENT: Negative for nosebleeds, vision changes. Respiratory: Negative for cough, hemoptysis, sputum production, and wheezing. +HERNÁNDEZ  Cardiovascular: Negative for chest pain, palpitations, orthopnea, claudication, no syncope, and PND. +HERNÁNDEZ  Gastrointestinal: Negative for nausea, vomiting, diarrhea, constipation, blood in stool and melena. Genitourinary: Negative for dysuria, and hematuria. Musculoskeletal: Negative for myalgias, arthralgia. Skin: Negative for rash. Heme: Does not bleed or bruise easily. Neurological: Negative for speech change and focal weakness     Objective:     Visit Vitals    /70 (BP 1 Location: Left arm, BP Patient Position: Sitting)    Pulse 79    Resp 16    Ht 5' 11\" (1.803 m)    Wt 190 lb (86.2 kg)    SpO2 98%    BMI 26.5 kg/m2      Physical Exam:   Constitutional: well-developed and well-nourished. No distress. Head: Normocephalic and atraumatic. Eyes: Pupils are equal, round  Neck: supple. No JVD present. Cardiovascular: Normal rate, regular rhythm.   Exam reveals no gallop Detail Level: Detailed Use Modifier 58: No and no friction rub. No murmur heard. Pulmonary/Chest: Effort normal and breath sounds normal. No wheezes. Abdominal: Soft, no tenderness. Musculoskeletal: no edema. compression socks on  Neurological: alert,oriented. Skin: Skin is warm and dry  Psychiatric: normal mood and affect. Behavior is normal. Judgment and thought content normal.      EKG: normal sinus rhythm with non specific T wave abnormalities       Assessment/Plan:       ICD-10-CM ICD-9-CM    1. Dyspnea on exertion R06.09 786.09    2. Chronic obstructive pulmonary disease, unspecified COPD type ([facility] J44.9 496    3. Lung nodule R91.1 793.11    4. Crohn's disease with complication, unspecified gastrointestinal tract location [facility]) K50.919 555.9      Stress echo was normal.  He may have symptoms of COPD but he said he is not going back to see Dr Yessy Martinez. He said chest CT with Dr Erik Rivas was stable  He will call me if HERNÁNDEZ is worse  Without chest pain and because HERNÁNDEZ is better in winter, I do not think he has CAD and consider CTA or cardiac cath    Follow-up Disposition:  Return in about 1 year (around 11/21/2018), or if symptoms worsen or fail to improve. Thank you for involving me in this patient's care and please call with further concerns or questions. Jermaine Casey M.D.   Electrophysiology/Cardiology  [facility] [place]  [address] [place] [address], [facility] 91  [address] [facility], [facility] [address]  [phone] Stage: 2 Initial Size Of Lesion, X-Axis (Required For Stage 1): 1.4 Initial Size Of Lesion, Y-Axis (Required For Stage 1): 1.8 Margin In Cm (Required For Initial Stage): 0.6 Additional Stage Excised Diameter, Y-Axis (Required Stage 2 And Above): 4 Total Surgical Defect X-Axis In Cm (Optional For Stage 2 And Above): 0 Referring Physician (Optional): Dr. Burton Lab: [id number] Anesthesia Type: 1% lidocaine with epinephrine Anesthesia Volume In Cc: 6 Hemostasis: Electrocautery Estimated Blood Loss (Cc): minimal Stage 1 Preamble: A sterile surgical marking pen was used to outline the lesion that the patient identified.  The afore mentioned margin was then drawn around the outlined area. Anesthesia was infiltrated as noted above.  Sterile drapes were placed and the eyes were protected. Additional Stages Preamble: A sterile surgical marking pen was used to outline the previously created surgical wound with the afore mentioned margin around the corresponding positive peripheral margins determined by previous stage histology. Anesthesia was infiltrated as noted above.  Sterile drapes were placed and the eyes were protected. Excision Text: Incisions were then made with a 15-blade scalpel along the previously drawn lines. Sharp dissection at the appropriate tissue plane was then performed beneath the tissue and the specimen was extirpated with care given to maintain the tissue orientation. Staining Text: The tissue was inked and mapped according to the Mohs map and sent for rush permanent section analysis. Path Notes (To The Dermatopathologist)- Will Also Render In The Note: Check Margins\\nA - 12-3 o'clock Consent/Introductory Paragraph: The rationale for staged excision was explained to the patient and consent was obtained. The risks, benefits and alternatives to therapy were discussed in detail. Specifically, the risks of infection, scarring, bleeding, prolonged wound healing, incomplete removal, allergy to anesthesia, nerve injury and recurrence were addressed. Prior to the procedure, the treatment site was clearly identified and confirmed by the patient. All components of Universal Protocol/PAUSE Rule completed. Dressing Text: Vaseline and a pressure dressing were then applied to the wound. Return To Clinic: 2 days Post-Care Instructions: I reviewed with the patient in detail post-care instructions. Patient is not to engage in any heavy lifting, exercise, or swimming for the next 14 days. Should the patient develop any fevers, chills, bleeding, severe pain patient will contact the office immediately. Billing Type: Third-Party Bill

## 2022-11-15 ENCOUNTER — HOSPITAL ENCOUNTER (OUTPATIENT)
Dept: INFUSION THERAPY | Age: 72
End: 2022-11-15

## 2022-11-15 RX ORDER — SODIUM CHLORIDE 9 MG/ML
25 INJECTION, SOLUTION INTRAVENOUS CONTINUOUS
Status: DISPENSED | OUTPATIENT
Start: 2022-11-17 | End: 2022-11-17

## 2022-11-15 RX ORDER — DIPHENHYDRAMINE HYDROCHLORIDE 50 MG/ML
50 INJECTION, SOLUTION INTRAMUSCULAR; INTRAVENOUS
Status: ACTIVE | OUTPATIENT
Start: 2022-11-17 | End: 2022-11-17

## 2022-11-15 RX ORDER — ACETAMINOPHEN 325 MG/1
650 TABLET ORAL
Status: ACTIVE | OUTPATIENT
Start: 2022-11-17 | End: 2022-11-17

## 2022-11-17 ENCOUNTER — HOSPITAL ENCOUNTER (OUTPATIENT)
Dept: INFUSION THERAPY | Age: 72
Discharge: HOME | End: 2022-11-17
Payer: MEDICARE

## 2022-11-17 VITALS
RESPIRATION RATE: 18 BRPM | BODY MASS INDEX: 25.53 KG/M2 | DIASTOLIC BLOOD PRESSURE: 74 MMHG | WEIGHT: 188.27 LBS | TEMPERATURE: 98.8 F | HEART RATE: 71 BPM | SYSTOLIC BLOOD PRESSURE: 134 MMHG

## 2022-11-17 PROCEDURE — 74011250636 HC RX REV CODE- 250/636: Performed by: FAMILY MEDICINE

## 2022-11-17 PROCEDURE — 96413 CHEMO IV INFUSION 1 HR: CPT

## 2022-11-17 RX ADMIN — SODIUM CHLORIDE 25 ML/HR: 9 INJECTION, SOLUTION INTRAVENOUS at 14:08

## 2022-11-17 RX ADMIN — VEDOLIZUMAB 300 MG: 300 INJECTION, POWDER, LYOPHILIZED, FOR SOLUTION INTRAVENOUS at 14:10

## 2022-11-17 NOTE — PROGRESS NOTES
OPIC Short Note                       Date: 2022    Name: Black Goel    MRN: [id number]         : 1950      1330 Pt admit to [facility] for [facility] ambulatory in stable condition. Assessment completed. No new concerns voiced. PIV placed L AC without difficulty      Mr. Arizmendi's vitals were reviewed prior to and after treatment. Patient Vitals for the past 12 hrs:   Temp Pulse Resp BP   22 1315 98.8 °F (37.1 °C) 71 18 134/74         No results found for this or any previous visit (from the past 12 hour(s)). Medications given:   Medications Administered       0.9% sodium chloride infusion       Admin Date  2022 Action  New Bag Dose  25 mL/hr Rate  25 mL/hr Route  IntraVENous Administered By  Teri Garcia RN              vedolizumab (ENTYVIO) 300 mg in 0.9% sodium chloride 250 mL, overfill volume 25 mL infusion       Admin Date  2022 Action  New Bag Dose  300 mg Rate  560 mL/hr Route  IntraVENous Administered By  Teri Garcia RN                 PIV removed    Mr. Arizmendi tolerated the infusion   and had no complaints. Mr. Tegan Zarate was discharged from [place] [address] in stable condition.    Pt aware of next appt    Future Appointments   Date Time Provider Anne Mathew   2023  1:00 PM [address] [address] [facility]  2022  2:54 PM

## 2022-12-15 ENCOUNTER — TRANSCRIBE ORDER (OUTPATIENT)
Dept: GENERAL RADIOLOGY | Age: 72
End: 2022-12-15

## 2022-12-15 ENCOUNTER — HOSPITAL ENCOUNTER (OUTPATIENT)
Dept: GENERAL RADIOLOGY | Age: 72
Discharge: HOME | End: 2022-12-15
Attending: FAMILY MEDICINE
Payer: MEDICARE

## 2022-12-15 DIAGNOSIS — J40 BRONCHITIS: ICD-10-CM

## 2022-12-15 DIAGNOSIS — J40 BRONCHITIS: Primary | ICD-10-CM

## 2022-12-15 PROCEDURE — 71046 X-RAY EXAM CHEST 2 VIEWS: CPT

## 2023-01-12 ENCOUNTER — APPOINTMENT (OUTPATIENT)
Dept: INFUSION THERAPY | Age: 73
End: 2023-01-12

## 2023-03-09 ENCOUNTER — APPOINTMENT (OUTPATIENT)
Dept: INFUSION THERAPY | Age: 73
End: 2023-03-09

## 2023-04-13 ENCOUNTER — HOSPITAL ENCOUNTER (OUTPATIENT)
Dept: INFUSION THERAPY | Age: 73
End: 2023-04-13

## 2023-04-28 RX ORDER — SODIUM CHLORIDE 9 MG/ML
25 INJECTION, SOLUTION INTRAVENOUS CONTINUOUS
Status: DISCONTINUED | OUTPATIENT
Start: 2023-05-05 | End: 2023-05-06 | Stop reason: HOSPADM

## 2023-04-28 RX ORDER — DIPHENHYDRAMINE HYDROCHLORIDE 50 MG/ML
50 INJECTION, SOLUTION INTRAMUSCULAR; INTRAVENOUS
Status: DISCONTINUED | OUTPATIENT
Start: 2023-05-05 | End: 2023-05-06 | Stop reason: HOSPADM

## 2023-04-28 RX ORDER — ACETAMINOPHEN 325 MG/1
650 TABLET ORAL
Status: DISCONTINUED | OUTPATIENT
Start: 2023-05-05 | End: 2023-05-06 | Stop reason: HOSPADM

## 2023-05-05 ENCOUNTER — HOSPITAL ENCOUNTER (OUTPATIENT)
Dept: INFUSION THERAPY | Age: 73
Discharge: HOME | End: 2023-05-05
Payer: MEDICARE

## 2023-05-05 VITALS
SYSTOLIC BLOOD PRESSURE: 134 MMHG | RESPIRATION RATE: 18 BRPM | TEMPERATURE: 96.9 F | HEART RATE: 58 BPM | DIASTOLIC BLOOD PRESSURE: 74 MMHG

## 2023-05-05 PROCEDURE — 74011250636 HC RX REV CODE- 250/636: Performed by: INTERNAL MEDICINE

## 2023-05-05 PROCEDURE — 96365 THER/PROPH/DIAG IV INF INIT: CPT

## 2023-05-05 RX ADMIN — VEDOLIZUMAB 300 MG: 300 INJECTION, POWDER, LYOPHILIZED, FOR SOLUTION INTRAVENOUS at 09:35

## 2023-05-05 RX ADMIN — SODIUM CHLORIDE 25 ML/HR: 9 INJECTION, SOLUTION INTRAVENOUS at 09:00

## 2023-06-23 RX ORDER — SODIUM CHLORIDE 0.9 % (FLUSH) 0.9 %
5-40 SYRINGE (ML) INJECTION PRN
Status: CANCELLED | OUTPATIENT
Start: 2023-06-30

## 2023-06-23 RX ORDER — HEPARIN 100 UNIT/ML
500 SYRINGE INTRAVENOUS PRN
Status: CANCELLED | OUTPATIENT
Start: 2023-06-30

## 2023-06-23 RX ORDER — ACETAMINOPHEN 325 MG/1
650 TABLET ORAL
Status: CANCELLED | OUTPATIENT
Start: 2023-06-30

## 2023-06-23 RX ORDER — SODIUM CHLORIDE 9 MG/ML
5-250 INJECTION, SOLUTION INTRAVENOUS PRN
Status: CANCELLED | OUTPATIENT
Start: 2023-06-30

## 2023-06-23 RX ORDER — DIPHENHYDRAMINE HYDROCHLORIDE 50 MG/ML
50 INJECTION INTRAMUSCULAR; INTRAVENOUS
Status: CANCELLED | OUTPATIENT
Start: 2023-06-30

## 2023-06-23 RX ORDER — EPINEPHRINE 1 MG/ML
0.3 INJECTION, SOLUTION, CONCENTRATE INTRAVENOUS PRN
Status: CANCELLED | OUTPATIENT
Start: 2023-06-30

## 2023-06-23 RX ORDER — ONDANSETRON 2 MG/ML
8 INJECTION INTRAMUSCULAR; INTRAVENOUS
Status: CANCELLED | OUTPATIENT
Start: 2023-06-30

## 2023-06-23 RX ORDER — SODIUM CHLORIDE 9 MG/ML
INJECTION, SOLUTION INTRAVENOUS CONTINUOUS
Status: CANCELLED | OUTPATIENT
Start: 2023-06-30

## 2023-06-23 RX ORDER — ALBUTEROL SULFATE 90 UG/1
4 AEROSOL, METERED RESPIRATORY (INHALATION) PRN
Status: CANCELLED | OUTPATIENT
Start: 2023-06-30

## 2023-06-30 ENCOUNTER — HOSPITAL ENCOUNTER (OUTPATIENT)
Facility: HOSPITAL | Age: 73
Setting detail: INFUSION SERIES
End: 2023-06-30
Payer: MEDICARE

## 2023-06-30 VITALS
HEART RATE: 62 BPM | TEMPERATURE: 97.8 F | RESPIRATION RATE: 18 BRPM | SYSTOLIC BLOOD PRESSURE: 140 MMHG | WEIGHT: 177 LBS | DIASTOLIC BLOOD PRESSURE: 82 MMHG

## 2023-06-30 DIAGNOSIS — K50.919 CROHN'S DISEASE WITH COMPLICATION, UNSPECIFIED GASTROINTESTINAL TRACT LOCATION (HCC): Primary | ICD-10-CM

## 2023-06-30 PROCEDURE — 2580000003 HC RX 258: Performed by: INTERNAL MEDICINE

## 2023-06-30 PROCEDURE — 6360000002 HC RX W HCPCS: Performed by: INTERNAL MEDICINE

## 2023-06-30 PROCEDURE — 96413 CHEMO IV INFUSION 1 HR: CPT

## 2023-06-30 RX ORDER — LANOLIN ALCOHOL/MO/W.PET/CERES
1000 CREAM (GRAM) TOPICAL DAILY
COMMUNITY

## 2023-06-30 RX ORDER — HEPARIN 100 UNIT/ML
500 SYRINGE INTRAVENOUS PRN
Status: CANCELLED | OUTPATIENT
Start: 2023-08-25

## 2023-06-30 RX ORDER — ALBUTEROL SULFATE 90 UG/1
4 AEROSOL, METERED RESPIRATORY (INHALATION) PRN
Status: CANCELLED | OUTPATIENT
Start: 2023-08-25

## 2023-06-30 RX ORDER — DIPHENHYDRAMINE HYDROCHLORIDE 50 MG/ML
50 INJECTION INTRAMUSCULAR; INTRAVENOUS
Status: CANCELLED | OUTPATIENT
Start: 2023-08-25

## 2023-06-30 RX ORDER — SODIUM CHLORIDE 9 MG/ML
5-250 INJECTION, SOLUTION INTRAVENOUS PRN
Status: CANCELLED | OUTPATIENT
Start: 2023-08-25

## 2023-06-30 RX ORDER — SODIUM CHLORIDE 9 MG/ML
5-250 INJECTION, SOLUTION INTRAVENOUS PRN
Status: DISCONTINUED | OUTPATIENT
Start: 2023-06-30 | End: 2023-07-01 | Stop reason: HOSPADM

## 2023-06-30 RX ORDER — ACETAMINOPHEN 325 MG/1
650 TABLET ORAL
Status: CANCELLED | OUTPATIENT
Start: 2023-08-25

## 2023-06-30 RX ORDER — ONDANSETRON 2 MG/ML
8 INJECTION INTRAMUSCULAR; INTRAVENOUS
Status: CANCELLED | OUTPATIENT
Start: 2023-08-25

## 2023-06-30 RX ORDER — SODIUM CHLORIDE 9 MG/ML
INJECTION, SOLUTION INTRAVENOUS CONTINUOUS
Status: CANCELLED | OUTPATIENT
Start: 2023-08-25

## 2023-06-30 RX ORDER — SODIUM CHLORIDE 0.9 % (FLUSH) 0.9 %
5-40 SYRINGE (ML) INJECTION PRN
Status: CANCELLED | OUTPATIENT
Start: 2023-08-25

## 2023-06-30 RX ORDER — EPINEPHRINE 1 MG/ML
0.3 INJECTION, SOLUTION, CONCENTRATE INTRAVENOUS PRN
Status: CANCELLED | OUTPATIENT
Start: 2023-08-25

## 2023-06-30 RX ADMIN — VEDOLIZUMAB 300 MG: 300 INJECTION, POWDER, LYOPHILIZED, FOR SOLUTION INTRAVENOUS at 09:00

## 2023-06-30 RX ADMIN — SODIUM CHLORIDE 50 ML/HR: 9 INJECTION, SOLUTION INTRAVENOUS at 08:57

## 2023-08-25 ENCOUNTER — HOSPITAL ENCOUNTER (OUTPATIENT)
Facility: HOSPITAL | Age: 73
Setting detail: INFUSION SERIES
End: 2023-08-25
Payer: MEDICARE

## 2023-08-25 VITALS
BODY MASS INDEX: 24.24 KG/M2 | DIASTOLIC BLOOD PRESSURE: 69 MMHG | RESPIRATION RATE: 18 BRPM | OXYGEN SATURATION: 95 % | SYSTOLIC BLOOD PRESSURE: 144 MMHG | HEART RATE: 65 BPM | WEIGHT: 179 LBS | HEIGHT: 72 IN | TEMPERATURE: 98 F

## 2023-08-25 DIAGNOSIS — K50.919 CROHN'S DISEASE WITH COMPLICATION, UNSPECIFIED GASTROINTESTINAL TRACT LOCATION (HCC): Primary | ICD-10-CM

## 2023-08-25 PROCEDURE — 96413 CHEMO IV INFUSION 1 HR: CPT

## 2023-08-25 PROCEDURE — 6360000002 HC RX W HCPCS: Performed by: INTERNAL MEDICINE

## 2023-08-25 PROCEDURE — 2580000003 HC RX 258: Performed by: INTERNAL MEDICINE

## 2023-08-25 RX ORDER — DIPHENHYDRAMINE HYDROCHLORIDE 50 MG/ML
50 INJECTION INTRAMUSCULAR; INTRAVENOUS
Status: CANCELLED | OUTPATIENT
Start: 2023-10-20

## 2023-08-25 RX ORDER — SODIUM CHLORIDE 9 MG/ML
INJECTION, SOLUTION INTRAVENOUS CONTINUOUS
Status: CANCELLED | OUTPATIENT
Start: 2023-10-20

## 2023-08-25 RX ORDER — HEPARIN 100 UNIT/ML
500 SYRINGE INTRAVENOUS PRN
Status: CANCELLED | OUTPATIENT
Start: 2023-10-20

## 2023-08-25 RX ORDER — SODIUM CHLORIDE 0.9 % (FLUSH) 0.9 %
5-40 SYRINGE (ML) INJECTION PRN
Status: DISCONTINUED | OUTPATIENT
Start: 2023-08-25 | End: 2023-08-26 | Stop reason: HOSPADM

## 2023-08-25 RX ORDER — SODIUM CHLORIDE 9 MG/ML
5-250 INJECTION, SOLUTION INTRAVENOUS PRN
Status: CANCELLED | OUTPATIENT
Start: 2023-10-20

## 2023-08-25 RX ORDER — EPINEPHRINE 1 MG/ML
0.3 INJECTION, SOLUTION, CONCENTRATE INTRAVENOUS PRN
Status: CANCELLED | OUTPATIENT
Start: 2023-10-20

## 2023-08-25 RX ORDER — SODIUM CHLORIDE 9 MG/ML
5-250 INJECTION, SOLUTION INTRAVENOUS PRN
Status: DISCONTINUED | OUTPATIENT
Start: 2023-08-25 | End: 2023-08-26 | Stop reason: HOSPADM

## 2023-08-25 RX ORDER — ONDANSETRON 2 MG/ML
8 INJECTION INTRAMUSCULAR; INTRAVENOUS
Status: CANCELLED | OUTPATIENT
Start: 2023-10-20

## 2023-08-25 RX ORDER — ACETAMINOPHEN 325 MG/1
650 TABLET ORAL
Status: CANCELLED | OUTPATIENT
Start: 2023-10-20

## 2023-08-25 RX ORDER — ALBUTEROL SULFATE 90 UG/1
4 AEROSOL, METERED RESPIRATORY (INHALATION) PRN
Status: CANCELLED | OUTPATIENT
Start: 2023-10-20

## 2023-08-25 RX ORDER — SODIUM CHLORIDE 0.9 % (FLUSH) 0.9 %
5-40 SYRINGE (ML) INJECTION PRN
Status: CANCELLED | OUTPATIENT
Start: 2023-10-20

## 2023-08-25 RX ADMIN — SODIUM CHLORIDE 25 ML/HR: 9 INJECTION, SOLUTION INTRAVENOUS at 08:20

## 2023-08-25 RX ADMIN — SODIUM CHLORIDE, PRESERVATIVE FREE 10 ML: 5 INJECTION INTRAVENOUS at 08:59

## 2023-08-25 RX ADMIN — SODIUM CHLORIDE, PRESERVATIVE FREE 10 ML: 5 INJECTION INTRAVENOUS at 07:30

## 2023-08-25 RX ADMIN — VEDOLIZUMAB 300 MG: 300 INJECTION, POWDER, LYOPHILIZED, FOR SOLUTION INTRAVENOUS at 08:20

## 2023-10-03 RX ORDER — DIPHENHYDRAMINE HYDROCHLORIDE 50 MG/ML
50 INJECTION INTRAMUSCULAR; INTRAVENOUS EVERY 4 HOURS PRN
Status: CANCELLED | OUTPATIENT
Start: 2023-10-15

## 2023-10-03 RX ORDER — ACETAMINOPHEN 325 MG/1
650 TABLET ORAL EVERY 4 HOURS PRN
Status: CANCELLED | OUTPATIENT
Start: 2023-10-15

## 2023-10-17 ENCOUNTER — HOSPITAL ENCOUNTER (OUTPATIENT)
Age: 73
Discharge: HOME | End: 2023-10-20
Payer: MEDICARE

## 2023-10-17 DIAGNOSIS — M54.2 CERVICALGIA: ICD-10-CM

## 2023-10-17 PROCEDURE — 72040 X-RAY EXAM NECK SPINE 2-3 VW: CPT

## 2023-10-20 ENCOUNTER — HOSPITAL ENCOUNTER (OUTPATIENT)
Facility: HOSPITAL | Age: 73
Setting detail: INFUSION SERIES
End: 2023-10-20
Payer: MEDICARE

## 2023-10-23 ENCOUNTER — HOSPITAL ENCOUNTER (OUTPATIENT)
Facility: HOSPITAL | Age: 73
Setting detail: INFUSION SERIES
Discharge: HOME | End: 2023-10-23
Payer: MEDICARE

## 2023-10-23 VITALS
DIASTOLIC BLOOD PRESSURE: 69 MMHG | HEART RATE: 62 BPM | TEMPERATURE: 97.5 F | SYSTOLIC BLOOD PRESSURE: 133 MMHG | WEIGHT: 182.2 LBS | RESPIRATION RATE: 16 BRPM | BODY MASS INDEX: 24.71 KG/M2

## 2023-10-23 DIAGNOSIS — K50.919 CROHN'S DISEASE WITH COMPLICATION, UNSPECIFIED GASTROINTESTINAL TRACT LOCATION (HCC): Primary | ICD-10-CM

## 2023-10-23 PROCEDURE — 6360000002 HC RX W HCPCS: Performed by: INTERNAL MEDICINE

## 2023-10-23 PROCEDURE — 2580000003 HC RX 258: Performed by: INTERNAL MEDICINE

## 2023-10-23 PROCEDURE — 96413 CHEMO IV INFUSION 1 HR: CPT

## 2023-10-23 RX ORDER — SODIUM CHLORIDE 9 MG/ML
5-250 INJECTION, SOLUTION INTRAVENOUS PRN
OUTPATIENT
Start: 2023-12-11

## 2023-10-23 RX ORDER — DIPHENHYDRAMINE HYDROCHLORIDE 50 MG/ML
50 INJECTION INTRAMUSCULAR; INTRAVENOUS EVERY 4 HOURS PRN
OUTPATIENT
Start: 2023-12-11

## 2023-10-23 RX ORDER — SODIUM CHLORIDE 9 MG/ML
5-250 INJECTION, SOLUTION INTRAVENOUS PRN
Status: DISCONTINUED | OUTPATIENT
Start: 2023-10-23 | End: 2023-10-24 | Stop reason: HOSPADM

## 2023-10-23 RX ORDER — ACETAMINOPHEN 325 MG/1
650 TABLET ORAL EVERY 4 HOURS PRN
OUTPATIENT
Start: 2023-12-11

## 2023-10-23 RX ADMIN — SODIUM CHLORIDE 25 ML/HR: 9 INJECTION, SOLUTION INTRAVENOUS at 15:23

## 2023-10-23 RX ADMIN — VEDOLIZUMAB 300 MG: 300 INJECTION, POWDER, LYOPHILIZED, FOR SOLUTION INTRAVENOUS at 15:25

## 2023-10-23 ASSESSMENT — PAIN SCALES - GENERAL: PAINLEVEL_OUTOF10: 0

## 2023-11-09 NOTE — PROGRESS NOTES
Bedside and Verbal shift change report given to Zfaar Garcia RN (oncoming nurse) by AFUA Forrester (offgoing nurse). Report included the following information SBAR, Kardex, Intake/Output, MAR and Recent Results. Zyclara Counseling:  I discussed with the patient the risks of imiquimod including but not limited to erythema, scaling, itching, weeping, crusting, and pain.  Patient understands that the inflammatory response to imiquimod is variable from person to person and was educated regarded proper titration schedule.  If flu-like symptoms develop, patient knows to discontinue the medication and contact us.

## 2023-12-18 ENCOUNTER — APPOINTMENT (OUTPATIENT)
Facility: HOSPITAL | Age: 73
End: 2023-12-18
Payer: MEDICARE

## 2024-04-03 RX ORDER — DIPHENHYDRAMINE HYDROCHLORIDE 50 MG/ML
50 INJECTION INTRAMUSCULAR; INTRAVENOUS EVERY 4 HOURS PRN
Status: CANCELLED | OUTPATIENT
Start: 2024-04-08

## 2024-04-03 RX ORDER — ACETAMINOPHEN 325 MG/1
650 TABLET ORAL EVERY 4 HOURS PRN
Status: CANCELLED | OUTPATIENT
Start: 2024-04-08

## 2024-04-03 RX ORDER — SODIUM CHLORIDE 0.9 % (FLUSH) 0.9 %
5-40 SYRINGE (ML) INJECTION PRN
Status: CANCELLED | OUTPATIENT
Start: 2024-04-08

## 2024-04-03 RX ORDER — SODIUM CHLORIDE 9 MG/ML
5-250 INJECTION, SOLUTION INTRAVENOUS PRN
Status: CANCELLED | OUTPATIENT
Start: 2024-04-08

## 2024-04-08 ENCOUNTER — HOSPITAL ENCOUNTER (OUTPATIENT)
Facility: HOSPITAL | Age: 74
Setting detail: INFUSION SERIES
Discharge: HOME | End: 2024-04-08
Payer: MEDICARE

## 2024-04-08 VITALS
HEART RATE: 64 BPM | RESPIRATION RATE: 18 BRPM | TEMPERATURE: 97 F | DIASTOLIC BLOOD PRESSURE: 72 MMHG | WEIGHT: 186 LBS | OXYGEN SATURATION: 93 % | SYSTOLIC BLOOD PRESSURE: 142 MMHG | BODY MASS INDEX: 25.23 KG/M2

## 2024-04-08 DIAGNOSIS — K50.919 CROHN'S DISEASE WITH COMPLICATION, UNSPECIFIED GASTROINTESTINAL TRACT LOCATION (HCC): Primary | ICD-10-CM

## 2024-04-08 PROCEDURE — 6360000002 HC RX W HCPCS: Performed by: FAMILY MEDICINE

## 2024-04-08 PROCEDURE — 2580000003 HC RX 258: Performed by: FAMILY MEDICINE

## 2024-04-08 PROCEDURE — 96413 CHEMO IV INFUSION 1 HR: CPT

## 2024-04-08 RX ORDER — SODIUM CHLORIDE 9 MG/ML
5-250 INJECTION, SOLUTION INTRAVENOUS PRN
Status: DISCONTINUED | OUTPATIENT
Start: 2024-04-08 | End: 2024-04-09 | Stop reason: HOSPADM

## 2024-04-08 RX ORDER — ACETAMINOPHEN 325 MG/1
650 TABLET ORAL EVERY 4 HOURS PRN
OUTPATIENT
Start: 2024-06-03

## 2024-04-08 RX ORDER — DIPHENHYDRAMINE HYDROCHLORIDE 50 MG/ML
50 INJECTION INTRAMUSCULAR; INTRAVENOUS EVERY 4 HOURS PRN
OUTPATIENT
Start: 2024-06-03

## 2024-04-08 RX ORDER — SODIUM CHLORIDE 0.9 % (FLUSH) 0.9 %
5-40 SYRINGE (ML) INJECTION PRN
OUTPATIENT
Start: 2024-06-03

## 2024-04-08 RX ORDER — SODIUM CHLORIDE 9 MG/ML
5-250 INJECTION, SOLUTION INTRAVENOUS PRN
OUTPATIENT
Start: 2024-06-03

## 2024-04-08 RX ADMIN — SODIUM CHLORIDE 25 ML/HR: 9 INJECTION, SOLUTION INTRAVENOUS at 09:42

## 2024-04-08 RX ADMIN — VEDOLIZUMAB 300 MG: 300 INJECTION, POWDER, LYOPHILIZED, FOR SOLUTION INTRAVENOUS at 09:43

## 2024-04-08 NOTE — PLAN OF CARE
[facility] Short Note                       Date: 2024    Name: Timothy Jaime    MRN: [id number]         : 1950      Pt admit to [facility] for Entyvio ambulatory in stable condition. Assessment completed and documented in flowsheets. No acute concerns at this time.    Mr. Jaime's vitals were reviewed  Patient Vitals for the past 12 hrs:   Temp Pulse Resp BP SpO2   24 0852 97 °F (36.1 °C) 64 18 (!) 142/72 93 %       Medications given: via PIV  Medications Administered         0.9 % sodium chloride infusion Admin Date  2024 Action  New Bag Dose  25 mL/hr Rate  25 mL/hr Route  IntraVENous Administered By  Brooklynn Warren RN        vedolizumab (ENTYVIO) 300 mg in sodium chloride 0.9 % 250 mL infusion Admin Date  2024 Action  New Bag Dose  300 mg Rate  560 mL/hr Route  IntraVENous Administered By  Brooklynn Warren RN            PIV positive blood return noted, flushed and removed prior to discharge.    Mr. Jaime tolerated the infusion, and had no complaints.    [facility] and is aware of future appointments.     Future Appointments   Date Time Provider Department Center   6/3/2024  2:00 PM B1 [facility] [place] [facility]   2024  1:30 PM [facility]       Brooklynn Warren RN  2024  10:01 AM    Problem: Safety - Adult  Goal: Free from fall injury  Outcome: Progressing

## 2024-06-03 ENCOUNTER — HOSPITAL ENCOUNTER (OUTPATIENT)
Facility: HOSPITAL | Age: 74
Setting detail: INFUSION SERIES
End: 2024-06-03

## 2024-07-01 ENCOUNTER — OFFICE VISIT (OUTPATIENT)
Age: 74
End: 2024-07-01
Payer: MEDICARE

## 2024-07-01 VITALS
HEIGHT: 72 IN | WEIGHT: 179 LBS | DIASTOLIC BLOOD PRESSURE: 78 MMHG | BODY MASS INDEX: 24.24 KG/M2 | OXYGEN SATURATION: 97 % | HEART RATE: 68 BPM | SYSTOLIC BLOOD PRESSURE: 130 MMHG

## 2024-07-01 DIAGNOSIS — N18.2 CKD (CHRONIC KIDNEY DISEASE) STAGE 2, GFR 60-89 ML/MIN: ICD-10-CM

## 2024-07-01 DIAGNOSIS — I20.0 UNSTABLE ANGINA (HCC): Primary | ICD-10-CM

## 2024-07-01 DIAGNOSIS — Z87.891 HISTORY OF TOBACCO USE: ICD-10-CM

## 2024-07-01 DIAGNOSIS — E78.2 MIXED HYPERLIPIDEMIA: ICD-10-CM

## 2024-07-01 PROCEDURE — 93010 ELECTROCARDIOGRAM REPORT: CPT | Performed by: INTERNAL MEDICINE

## 2024-07-01 PROCEDURE — 3017F COLORECTAL CA SCREEN DOC REV: CPT | Performed by: INTERNAL MEDICINE

## 2024-07-01 PROCEDURE — 93005 ELECTROCARDIOGRAM TRACING: CPT | Performed by: INTERNAL MEDICINE

## 2024-07-01 PROCEDURE — 99204 OFFICE O/P NEW MOD 45 MIN: CPT | Performed by: INTERNAL MEDICINE

## 2024-07-01 PROCEDURE — G8420 CALC BMI NORM PARAMETERS: HCPCS | Performed by: INTERNAL MEDICINE

## 2024-07-01 PROCEDURE — 1123F ACP DISCUSS/DSCN MKR DOCD: CPT | Performed by: INTERNAL MEDICINE

## 2024-07-01 PROCEDURE — 1036F TOBACCO NON-USER: CPT | Performed by: INTERNAL MEDICINE

## 2024-07-01 PROCEDURE — G8427 DOCREV CUR MEDS BY ELIG CLIN: HCPCS | Performed by: INTERNAL MEDICINE

## 2024-07-01 ASSESSMENT — PATIENT HEALTH QUESTIONNAIRE - PHQ9
SUM OF ALL RESPONSES TO PHQ9 QUESTIONS 1 & 2: 0
SUM OF ALL RESPONSES TO PHQ QUESTIONS 1-9: 0
1. LITTLE INTEREST OR PLEASURE IN DOING THINGS: NOT AT ALL
2. FEELING DOWN, DEPRESSED OR HOPELESS: NOT AT ALL

## 2024-07-01 NOTE — PATIENT INSTRUCTIONS
You will be scheduled for a Stress Echo after your appointment today.  Please wear comfortable clothing (shorts or pants with a shirt or blouse) and walking/athletic shoes.  Do not eat or drink anything, except water, for at least 2 hours prior to your test.  Do take your scheduled medications prior to your test.

## 2024-07-01 NOTE — PROGRESS NOTES
[facility] [place]:   [phone]    History of Present Illness:  Mr. Timothy Jaime is a 72 yo M with a history of tobacco use, stage 2 CKD, followed by renal Dr. Landa, Crohn's disease, followed by GI, mixed hyperlipidemia, here for cardiac evaluation. He is here due to shortness of breath.  This has been progressive over the last few years and in particular when he is going uphill or going up or down stairs.  He does have occasional \"twinge\" in the left side of his chest that will happen with no clear triggers or exacerbating factors, sometimes when it is happening it will be more so with a deep breath.  He was concerned that he was reading about the possibility of silent heart attacks and he did have severe back pain a year or two ago. He is compensated on exam with clear lungs and no lower extremity edema. EKG is normal sinus rhythm, nonspecific ST-T wave abnormality.  Soc hx. Quit tobacco >10 yrs a  Fam hx. No early CAD  Assessment/Plan:  1. Unstable angina.  Shortness of breath with possible anginal equivalent with typical/atypical features and multiple risk factors; will proceed with a treadmill stress echo for further evaluation.  2. Tobacco use.  Quit greater than ten years ago.  3. CKD stage 2.  Followed by renal, Dr. Landa.  4. Crohn's disease.  Followed by [facility] and has been seen at [place] in the past.  This has been fairly stable per patient.  5. Mixed hyperlipidemia.  Tolerating statin.        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 . Grown children   Family hx: No family hx of early CAD. Father unstable angina- no reported CAD. No hx of ppm/ICD. No SCD.       He  has a past medical history of COPD (chronic obstructive pulmonary disease) (HCC), Crohn's disease (HCC), Depression, Elevated lipids, and Hyperlipidemia.    All other systems negative except as above.     PE  Vitals:    07/01/24 1024   BP: 130/78   Site: Left Upper Arm   Position: Sitting   Cuff Size: Large

## 2024-07-29 ENCOUNTER — HOSPITAL ENCOUNTER (OUTPATIENT)
Facility: HOSPITAL | Age: 74
Setting detail: INFUSION SERIES
Discharge: HOME | End: 2024-07-29
Payer: MEDICARE

## 2024-07-29 VITALS
DIASTOLIC BLOOD PRESSURE: 56 MMHG | TEMPERATURE: 97.8 F | BODY MASS INDEX: 24.28 KG/M2 | RESPIRATION RATE: 17 BRPM | HEART RATE: 63 BPM | WEIGHT: 179 LBS | SYSTOLIC BLOOD PRESSURE: 131 MMHG

## 2024-07-29 DIAGNOSIS — K50.919 CROHN'S DISEASE WITH COMPLICATION, UNSPECIFIED GASTROINTESTINAL TRACT LOCATION (HCC): Primary | ICD-10-CM

## 2024-07-29 PROCEDURE — 2580000003 HC RX 258: Performed by: FAMILY MEDICINE

## 2024-07-29 PROCEDURE — 6360000002 HC RX W HCPCS: Performed by: FAMILY MEDICINE

## 2024-07-29 PROCEDURE — 96413 CHEMO IV INFUSION 1 HR: CPT

## 2024-07-29 PROCEDURE — 96365 THER/PROPH/DIAG IV INF INIT: CPT

## 2024-07-29 RX ORDER — SODIUM CHLORIDE 9 MG/ML
5-250 INJECTION, SOLUTION INTRAVENOUS PRN
Status: DISCONTINUED | OUTPATIENT
Start: 2024-07-29 | End: 2024-07-30 | Stop reason: HOSPADM

## 2024-07-29 RX ORDER — ACETAMINOPHEN 325 MG/1
650 TABLET ORAL EVERY 4 HOURS PRN
OUTPATIENT
Start: 2024-09-23

## 2024-07-29 RX ORDER — DIPHENHYDRAMINE HYDROCHLORIDE 50 MG/ML
50 INJECTION INTRAMUSCULAR; INTRAVENOUS EVERY 4 HOURS PRN
OUTPATIENT
Start: 2024-09-23

## 2024-07-29 RX ORDER — SODIUM CHLORIDE 0.9 % (FLUSH) 0.9 %
5-40 SYRINGE (ML) INJECTION PRN
OUTPATIENT
Start: 2024-09-23

## 2024-07-29 RX ORDER — SODIUM CHLORIDE 9 MG/ML
5-250 INJECTION, SOLUTION INTRAVENOUS PRN
OUTPATIENT
Start: 2024-09-23

## 2024-07-29 RX ADMIN — VEDOLIZUMAB 300 MG: 300 INJECTION, POWDER, LYOPHILIZED, FOR SOLUTION INTRAVENOUS at 14:58

## 2024-07-29 RX ADMIN — SODIUM CHLORIDE 25 ML/HR: 9 INJECTION, SOLUTION INTRAVENOUS at 13:42

## 2024-07-29 ASSESSMENT — PAIN SCALES - GENERAL: PAINLEVEL_OUTOF10: 0

## 2024-07-29 NOTE — PROGRESS NOTES
Pt arrived to [facility] for Entyvio in stable condition.  PIV established to left arm with positive blood return.  NS started at [facility].    Vitals:    07/29/24 1330   BP: (!) 131/56   Pulse: 63   Resp: 17   Temp: 97.8 °F (36.6 °C)     Medications Administered         0.9 % sodium chloride infusion Admin Date  07/29/2024 Action  New Bag Dose  25 mL/hr Rate  25 mL/hr Route  IntraVENous Administered By  Mariama Rogers, MONIE        vedolizumab (ENTYVIO) 300 mg in sodium chloride 0.9 % 250 mL infusion Admin Date  07/29/2024 Action  New Bag Dose  300 mg Rate  560 mL/hr Route  IntraVENous Administered By  Mariama Rogers, MONIE              PIV flushed and removed per protocol. Pt tolerated treatment well. D/Cd from [facility] in no distress.  Future Appointments   Date Time Provider Department Center   8/12/2024  3:00 PM [facility] STRESS ECHO DANIELA MCGOVERN [facility]   9/23/2024  9:30 AM [facility] CHAIR 1 [facility]   11/18/2024  9:30 AM [facility]

## 2024-08-12 ENCOUNTER — TELEPHONE (OUTPATIENT)
Age: 74
End: 2024-08-12

## 2024-08-12 NOTE — TELEPHONE ENCOUNTER
Verified Patient with two identifiers.    Patient requesting to cancel and R/S Stress test scheduled for today at 3    Please Assist  Thank you    Preferred Number:   [phone]

## 2024-09-23 ENCOUNTER — HOSPITAL ENCOUNTER (OUTPATIENT)
Facility: HOSPITAL | Age: 74
Setting detail: INFUSION SERIES
Discharge: HOME | End: 2024-09-23
Payer: MEDICARE

## 2024-09-23 VITALS
SYSTOLIC BLOOD PRESSURE: 137 MMHG | RESPIRATION RATE: 18 BRPM | WEIGHT: 184 LBS | HEART RATE: 71 BPM | TEMPERATURE: 97.8 F | BODY MASS INDEX: 24.95 KG/M2 | DIASTOLIC BLOOD PRESSURE: 74 MMHG | OXYGEN SATURATION: 96 %

## 2024-09-23 DIAGNOSIS — K50.919 CROHN'S DISEASE WITH COMPLICATION, UNSPECIFIED GASTROINTESTINAL TRACT LOCATION (HCC): Primary | ICD-10-CM

## 2024-09-23 PROCEDURE — 6360000002 HC RX W HCPCS: Performed by: FAMILY MEDICINE

## 2024-09-23 PROCEDURE — 2580000003 HC RX 258: Performed by: FAMILY MEDICINE

## 2024-09-23 PROCEDURE — 96413 CHEMO IV INFUSION 1 HR: CPT

## 2024-09-23 PROCEDURE — 96365 THER/PROPH/DIAG IV INF INIT: CPT

## 2024-09-23 RX ORDER — DIPHENHYDRAMINE HYDROCHLORIDE 50 MG/ML
50 INJECTION INTRAMUSCULAR; INTRAVENOUS EVERY 4 HOURS PRN
OUTPATIENT
Start: 2024-11-18

## 2024-09-23 RX ORDER — SODIUM CHLORIDE 0.9 % (FLUSH) 0.9 %
5-40 SYRINGE (ML) INJECTION PRN
Status: DISCONTINUED | OUTPATIENT
Start: 2024-09-23 | End: 2024-09-24 | Stop reason: HOSPADM

## 2024-09-23 RX ORDER — ACETAMINOPHEN 325 MG/1
650 TABLET ORAL EVERY 4 HOURS PRN
OUTPATIENT
Start: 2024-11-18

## 2024-09-23 RX ORDER — SODIUM CHLORIDE 9 MG/ML
5-250 INJECTION, SOLUTION INTRAVENOUS PRN
OUTPATIENT
Start: 2024-11-18

## 2024-09-23 RX ORDER — SODIUM CHLORIDE 0.9 % (FLUSH) 0.9 %
5-40 SYRINGE (ML) INJECTION PRN
OUTPATIENT
Start: 2024-11-18

## 2024-09-23 RX ORDER — SODIUM CHLORIDE 9 MG/ML
5-250 INJECTION, SOLUTION INTRAVENOUS PRN
Status: DISCONTINUED | OUTPATIENT
Start: 2024-09-23 | End: 2024-09-24 | Stop reason: HOSPADM

## 2024-09-23 RX ADMIN — VEDOLIZUMAB 300 MG: 300 INJECTION, POWDER, LYOPHILIZED, FOR SOLUTION INTRAVENOUS at 09:51

## 2024-09-23 RX ADMIN — SODIUM CHLORIDE 30 ML/HR: 900 INJECTION, SOLUTION INTRAVENOUS at 09:30

## 2024-09-23 NOTE — PLAN OF CARE
[facility] Progress Note    Date: 2024    Name: Timothy Jaime    MRN: [id number]         : 1950    Mr. Jaime arrived ambulatory and in no distress for Entyvio.      Assessment was completed and documented in flowsheets. No acute concerns at this time. 24G IV placed without difficulty, positive blood return noted.    Mr. Jaime's vital signs for this visit.  Vitals:    24 0924   BP: 137/74   Pulse: 71   Resp: 18   Temp: 97.8 °F (36.6 °C)   SpO2: 96%        Medications given:   Medications Administered         0.9 % sodium chloride infusion Admin Date  2024 Action  New Bag Dose  30 mL/hr Rate  30 mL/hr Route  IntraVENous Documented By  Fannie Saleh RN        vedolizumab (ENTYVIO) 300 mg in sodium chloride 0.9 % 250 mL infusion Admin Date  2024 Action  New Bag Dose  300 mg Rate  560 mL/hr Route  IntraVENous Documented By  Fannie Saleh RN            Mr. Jaime tolerated the infusion, and had no complaints.  PIV flushed and removed after completion of infusion. 2x2 and coban placed.   [facility]. Jaime [facility] in [facility] condition and is aware of future appointments.     Future Appointments   Date Time Provider Department Center   2024  9:30 AM [facility] INF CHAIR 1 [facility]       FANNIE SALEH RN  2024  9:56 AM   Problem: Safety - Adult  Goal: Free from fall injury  Outcome: Progressing

## 2024-11-18 ENCOUNTER — HOSPITAL ENCOUNTER (OUTPATIENT)
Facility: HOSPITAL | Age: 74
Setting detail: INFUSION SERIES
Discharge: HOME | End: 2024-11-18
Payer: MEDICARE

## 2024-11-18 VITALS
BODY MASS INDEX: 24.57 KG/M2 | OXYGEN SATURATION: 97 % | RESPIRATION RATE: 18 BRPM | DIASTOLIC BLOOD PRESSURE: 72 MMHG | HEIGHT: 72 IN | WEIGHT: 181.4 LBS | SYSTOLIC BLOOD PRESSURE: 150 MMHG | TEMPERATURE: 97.3 F | HEART RATE: 70 BPM

## 2024-11-18 DIAGNOSIS — K50.919 CROHN'S DISEASE WITH COMPLICATION, UNSPECIFIED GASTROINTESTINAL TRACT LOCATION (HCC): Primary | ICD-10-CM

## 2024-11-18 PROCEDURE — 2580000003 HC RX 258: Performed by: FAMILY MEDICINE

## 2024-11-18 PROCEDURE — 6360000002 HC RX W HCPCS: Performed by: FAMILY MEDICINE

## 2024-11-18 PROCEDURE — 96413 CHEMO IV INFUSION 1 HR: CPT

## 2024-11-18 PROCEDURE — 96365 THER/PROPH/DIAG IV INF INIT: CPT

## 2024-11-18 RX ORDER — SODIUM CHLORIDE 9 MG/ML
5-250 INJECTION, SOLUTION INTRAVENOUS PRN
Status: DISCONTINUED | OUTPATIENT
Start: 2024-11-18 | End: 2024-11-19 | Stop reason: HOSPADM

## 2024-11-18 RX ORDER — DIPHENHYDRAMINE HYDROCHLORIDE 50 MG/ML
50 INJECTION INTRAMUSCULAR; INTRAVENOUS EVERY 4 HOURS PRN
OUTPATIENT
Start: 2025-01-13

## 2024-11-18 RX ORDER — SODIUM CHLORIDE 0.9 % (FLUSH) 0.9 %
5-40 SYRINGE (ML) INJECTION PRN
OUTPATIENT
Start: 2025-01-13

## 2024-11-18 RX ORDER — SODIUM CHLORIDE 9 MG/ML
5-250 INJECTION, SOLUTION INTRAVENOUS PRN
OUTPATIENT
Start: 2025-01-13

## 2024-11-18 RX ORDER — ACETAMINOPHEN 325 MG/1
650 TABLET ORAL EVERY 4 HOURS PRN
OUTPATIENT
Start: 2025-01-13

## 2024-11-18 RX ADMIN — VEDOLIZUMAB 300 MG: 300 INJECTION, POWDER, LYOPHILIZED, FOR SOLUTION INTRAVENOUS at 10:28

## 2024-11-18 ASSESSMENT — PAIN SCALES - GENERAL: PAINLEVEL_OUTOF10: 0

## 2024-12-14 ENCOUNTER — OFFICE VISIT (OUTPATIENT)
Age: 74
End: 2024-12-14

## 2024-12-14 VITALS
OXYGEN SATURATION: 96 % | HEIGHT: 72 IN | DIASTOLIC BLOOD PRESSURE: 74 MMHG | TEMPERATURE: 98.1 F | HEART RATE: 73 BPM | WEIGHT: 187 LBS | SYSTOLIC BLOOD PRESSURE: 126 MMHG | BODY MASS INDEX: 25.33 KG/M2

## 2024-12-14 DIAGNOSIS — J40 BRONCHITIS: Primary | ICD-10-CM

## 2024-12-15 PROBLEM — R06.09 DYSPNEA ON EXERTION: Status: ACTIVE | Noted: 2024-04-18

## 2024-12-15 PROBLEM — K58.9 IRRITABLE BOWEL SYNDROME: Status: ACTIVE | Noted: 2024-12-15

## 2024-12-15 PROBLEM — D64.9 ANEMIA: Status: ACTIVE | Noted: 2017-01-31

## 2024-12-15 PROBLEM — F32.A DEPRESSIVE DISORDER: Status: ACTIVE | Noted: 2024-12-15

## 2024-12-15 PROBLEM — L98.9 DISORDER OF SKIN OF LOWER EXTREMITY: Status: ACTIVE | Noted: 2024-09-13

## 2024-12-15 PROBLEM — I73.9 PERIPHERAL VASCULAR DISEASE (HCC): Status: ACTIVE | Noted: 2017-01-31

## 2024-12-15 PROBLEM — H43.819 POSTERIOR VITREOUS DETACHMENT: Status: ACTIVE | Noted: 2024-12-15

## 2024-12-15 PROBLEM — R03.0 ELEVATED BLOOD PRESSURE READING: Status: ACTIVE | Noted: 2024-07-01

## 2024-12-15 PROBLEM — K50.80 CROHN'S DISEASE OF SMALL AND LARGE INTESTINES (HCC): Status: ACTIVE | Noted: 2020-04-06

## 2024-12-15 PROBLEM — M54.2 NECK PAIN: Status: ACTIVE | Noted: 2023-10-17

## 2024-12-15 PROBLEM — M19.90 OSTEOARTHROSIS: Status: ACTIVE | Noted: 2024-12-15

## 2024-12-15 PROBLEM — K21.9 GASTROESOPHAGEAL REFLUX DISEASE: Status: ACTIVE | Noted: 2024-12-15

## 2024-12-15 PROBLEM — G62.9 PERIPHERAL NERVE DISEASE: Status: ACTIVE | Noted: 2024-12-15

## 2024-12-15 PROBLEM — N28.9 KIDNEY DYSFUNCTION: Status: ACTIVE | Noted: 2024-12-15

## 2024-12-15 PROBLEM — N18.2 STAGE 2 CHRONIC KIDNEY DISEASE: Status: ACTIVE | Noted: 2023-05-01

## 2024-12-15 PROBLEM — H26.9 BILATERAL CATARACTS: Status: ACTIVE | Noted: 2024-12-15

## 2024-12-15 PROBLEM — M54.50 LOW BACK PAIN: Status: ACTIVE | Noted: 2024-12-15

## 2024-12-15 PROBLEM — E27.8 MASS OF LEFT ADRENAL GLAND (HCC): Status: ACTIVE | Noted: 2024-12-15

## 2024-12-15 PROBLEM — D51.0 PERNICIOUS ANEMIA: Status: ACTIVE | Noted: 2024-12-15

## 2024-12-15 RX ORDER — BENZONATATE 200 MG/1
200 CAPSULE ORAL 3 TIMES DAILY PRN
Qty: 21 CAPSULE | Refills: 0 | Status: SHIPPED | OUTPATIENT
Start: 2024-12-15 | End: 2024-12-22

## 2024-12-15 RX ORDER — METHYLPREDNISOLONE 4 MG/1
TABLET ORAL
Qty: 21 KIT | Refills: 0 | Status: SHIPPED | OUTPATIENT
Start: 2024-12-15

## 2025-01-13 ENCOUNTER — HOSPITAL ENCOUNTER (OUTPATIENT)
Facility: HOSPITAL | Age: 75
Setting detail: INFUSION SERIES
Discharge: HOME | End: 2025-01-13
Payer: MEDICARE

## 2025-01-13 VITALS
TEMPERATURE: 97.2 F | HEART RATE: 70 BPM | WEIGHT: 183 LBS | RESPIRATION RATE: 17 BRPM | DIASTOLIC BLOOD PRESSURE: 79 MMHG | OXYGEN SATURATION: 95 % | BODY MASS INDEX: 24.82 KG/M2 | SYSTOLIC BLOOD PRESSURE: 136 MMHG

## 2025-01-13 DIAGNOSIS — K50.919 CROHN'S DISEASE WITH COMPLICATION, UNSPECIFIED GASTROINTESTINAL TRACT LOCATION (HCC): Primary | ICD-10-CM

## 2025-01-13 PROCEDURE — 96413 CHEMO IV INFUSION 1 HR: CPT

## 2025-01-13 PROCEDURE — 96365 THER/PROPH/DIAG IV INF INIT: CPT

## 2025-01-13 PROCEDURE — 2580000003 HC RX 258: Performed by: FAMILY MEDICINE

## 2025-01-13 PROCEDURE — 6360000002 HC RX W HCPCS: Performed by: FAMILY MEDICINE

## 2025-01-13 RX ORDER — SODIUM CHLORIDE 0.9 % (FLUSH) 0.9 %
5-40 SYRINGE (ML) INJECTION PRN
OUTPATIENT
Start: 2025-03-10

## 2025-01-13 RX ORDER — DIPHENHYDRAMINE HYDROCHLORIDE 50 MG/ML
50 INJECTION INTRAMUSCULAR; INTRAVENOUS EVERY 4 HOURS PRN
OUTPATIENT
Start: 2025-03-10

## 2025-01-13 RX ORDER — SODIUM CHLORIDE 9 MG/ML
5-250 INJECTION, SOLUTION INTRAVENOUS PRN
OUTPATIENT
Start: 2025-03-10

## 2025-01-13 RX ORDER — ACETAMINOPHEN 325 MG/1
650 TABLET ORAL EVERY 4 HOURS PRN
OUTPATIENT
Start: 2025-03-10

## 2025-01-13 RX ADMIN — VEDOLIZUMAB 300 MG: 300 INJECTION, POWDER, LYOPHILIZED, FOR SOLUTION INTRAVENOUS at 10:22

## 2025-01-13 ASSESSMENT — PAIN SCALES - GENERAL: PAINLEVEL_OUTOF10: 0

## 2025-01-13 NOTE — PROGRESS NOTES
Pt arrived to [facility] for Entyvio in stable condition.  PIV established to left arm with positive blood return.     Vitals:    01/13/25 1054   BP: 136/79   Pulse: 70   Resp:    Temp:    SpO2:      Medications Administered         vedolizumab (ENTYVIO) 300 mg in sodium chloride 0.9 % 250 mL infusion Admin Date  01/13/2025 Action  New Bag Dose  300 mg Rate  560 mL/hr Route  IntraVENous Documented By  Mariama Rogers, RN              PIV flushed and removed per protocol. Pt tolerated treatment well. D/Cd from [facility] in no distress.  Future Appointments   Date Time Provider Department Center   4/28/2025  9:30 AM [facility] INF CHAIR 1 [facility]

## 2025-03-11 ENCOUNTER — HOSPITAL ENCOUNTER (OUTPATIENT)
Facility: HOSPITAL | Age: 75
Setting detail: INFUSION SERIES
Discharge: HOME | End: 2025-03-11
Payer: MEDICARE

## 2025-03-11 VITALS
DIASTOLIC BLOOD PRESSURE: 69 MMHG | SYSTOLIC BLOOD PRESSURE: 128 MMHG | TEMPERATURE: 97.7 F | RESPIRATION RATE: 16 BRPM | HEART RATE: 59 BPM

## 2025-03-11 DIAGNOSIS — K50.919 CROHN'S DISEASE WITH COMPLICATION, UNSPECIFIED GASTROINTESTINAL TRACT LOCATION (HCC): Primary | ICD-10-CM

## 2025-03-11 PROCEDURE — 96413 CHEMO IV INFUSION 1 HR: CPT

## 2025-03-11 PROCEDURE — 6360000002 HC RX W HCPCS: Performed by: FAMILY MEDICINE

## 2025-03-11 PROCEDURE — 2580000003 HC RX 258: Performed by: FAMILY MEDICINE

## 2025-03-11 PROCEDURE — 96365 THER/PROPH/DIAG IV INF INIT: CPT

## 2025-03-11 RX ORDER — SODIUM CHLORIDE 9 MG/ML
5-250 INJECTION, SOLUTION INTRAVENOUS PRN
Status: CANCELLED | OUTPATIENT
Start: 2025-03-11

## 2025-03-11 RX ORDER — DIPHENHYDRAMINE HYDROCHLORIDE 50 MG/ML
50 INJECTION, SOLUTION INTRAMUSCULAR; INTRAVENOUS EVERY 4 HOURS PRN
OUTPATIENT
Start: 2025-03-11

## 2025-03-11 RX ORDER — ACETAMINOPHEN 325 MG/1
650 TABLET ORAL EVERY 4 HOURS PRN
OUTPATIENT
Start: 2025-03-11

## 2025-03-11 RX ORDER — SODIUM CHLORIDE 9 MG/ML
5-250 INJECTION, SOLUTION INTRAVENOUS PRN
Status: DISCONTINUED | OUTPATIENT
Start: 2025-03-11 | End: 2025-03-12 | Stop reason: HOSPADM

## 2025-03-11 RX ORDER — SODIUM CHLORIDE 0.9 % (FLUSH) 0.9 %
5-40 SYRINGE (ML) INJECTION PRN
OUTPATIENT
Start: 2025-03-11

## 2025-03-11 RX ORDER — SODIUM CHLORIDE 0.9 % (FLUSH) 0.9 %
5-40 SYRINGE (ML) INJECTION PRN
Status: DISCONTINUED | OUTPATIENT
Start: 2025-03-11 | End: 2025-03-12 | Stop reason: HOSPADM

## 2025-03-11 RX ADMIN — VEDOLIZUMAB 300 MG: 300 INJECTION, POWDER, LYOPHILIZED, FOR SOLUTION INTRAVENOUS at 14:21

## 2025-03-11 ASSESSMENT — PAIN SCALES - GENERAL: PAINLEVEL_OUTOF10: 0

## 2025-03-11 NOTE — PROGRESS NOTES
[facility] Short Note                       Date: 2025    Name: Timothy Jaime    MRN: [id number]         : 1950      1:30 Pt admit to [facility] for ENTYVIO ambulatory in stable condition. Assessment completed. No new concerns voiced.        Mr. Jaime's vitals were reviewed prior to and after treatment.   Patient Vitals for the past 12 hrs:   Temp Pulse Resp BP   25 1451 -- 59 -- 128/69   25 1321 97.7 °F (36.5 °C) 68 16 (!) 144/57         Medications given: PIV R FA #24  Medications Administered         vedolizumab (ENTYVIO) 300 mg in sodium chloride 0.9 % 250 mL infusion Admin Date  2025 Action  New Bag Dose  300 mg Rate  560 mL/hr Route  IntraVENous Documented By  Erica Nagel, RN            Mr. Jaime tolerated the infusion, and had no complaints.    [facility] Ja[facility] condition.     Future Appointments   Date Time Provider Department Center   2025  9:30 AM [facility] CHAIR 1 [facility]       Erica Nagel RN  2025  2:57 PM

## 2025-04-28 ENCOUNTER — APPOINTMENT (OUTPATIENT)
Facility: HOSPITAL | Age: 75
End: 2025-04-28
Payer: MEDICARE

## 2025-05-05 ENCOUNTER — HOSPITAL ENCOUNTER (OUTPATIENT)
Facility: HOSPITAL | Age: 75
Setting detail: INFUSION SERIES
Discharge: HOME | End: 2025-05-05
Payer: MEDICARE

## 2025-05-05 VITALS — DIASTOLIC BLOOD PRESSURE: 73 MMHG | SYSTOLIC BLOOD PRESSURE: 149 MMHG | HEART RATE: 69 BPM | TEMPERATURE: 96.8 F

## 2025-05-05 DIAGNOSIS — K50.919 CROHN'S DISEASE WITH COMPLICATION, UNSPECIFIED GASTROINTESTINAL TRACT LOCATION (HCC): Primary | ICD-10-CM

## 2025-05-05 PROCEDURE — 2580000003 HC RX 258: Performed by: FAMILY MEDICINE

## 2025-05-05 PROCEDURE — 6360000002 HC RX W HCPCS: Performed by: FAMILY MEDICINE

## 2025-05-05 PROCEDURE — 96365 THER/PROPH/DIAG IV INF INIT: CPT

## 2025-05-05 RX ORDER — SODIUM CHLORIDE 9 MG/ML
5-250 INJECTION, SOLUTION INTRAVENOUS PRN
Status: DISCONTINUED | OUTPATIENT
Start: 2025-05-05 | End: 2025-05-06 | Stop reason: HOSPADM

## 2025-05-05 RX ORDER — ACETAMINOPHEN 325 MG/1
650 TABLET ORAL EVERY 4 HOURS PRN
Start: 2025-06-30

## 2025-05-05 RX ORDER — DIPHENHYDRAMINE HYDROCHLORIDE 50 MG/ML
50 INJECTION, SOLUTION INTRAMUSCULAR; INTRAVENOUS EVERY 4 HOURS PRN
Start: 2025-06-30

## 2025-05-05 RX ORDER — SODIUM CHLORIDE 9 MG/ML
5-250 INJECTION, SOLUTION INTRAVENOUS PRN
OUTPATIENT
Start: 2025-06-30

## 2025-05-05 RX ORDER — DIPHENHYDRAMINE HYDROCHLORIDE 50 MG/ML
50 INJECTION, SOLUTION INTRAMUSCULAR; INTRAVENOUS EVERY 4 HOURS PRN
Status: DISCONTINUED | OUTPATIENT
Start: 2025-05-05 | End: 2025-05-06 | Stop reason: HOSPADM

## 2025-05-05 RX ORDER — ACETAMINOPHEN 325 MG/1
650 TABLET ORAL EVERY 4 HOURS PRN
Status: ACTIVE | OUTPATIENT
Start: 2025-05-05 | End: 2025-05-05

## 2025-05-05 RX ADMIN — VEDOLIZUMAB 300 MG: 300 INJECTION, POWDER, LYOPHILIZED, FOR SOLUTION INTRAVENOUS at 10:36

## 2025-05-05 ASSESSMENT — PAIN SCALES - GENERAL: PAINLEVEL_OUTOF10: 0

## 2025-05-05 NOTE — PROGRESS NOTES
[facility] Progress Note    0915 Pt admit to [facility] for En[facility]vio ambulatory in stable condition. Assessment completed. No new concerns voiced. PIV access established with positive blood return.       BP (!) 149/73   Pulse 69   Temp 96.8 °F (36 °C) (Temporal)     Medications Administered         vedolizumab (ENTYVIO) 300 mg in sodium chloride 0.9 % 250 mL infusion Admin Date  05/05/2025 Action  New Bag Dose  300 mg Rate  560 mL/hr Route  IntraVENous Documented By  Madalyn Pisano, RN            1120 Pt tolerated treatment well. PIV removed at discharge. D/c home ambulatory in no distress. Pt aware of next [facility] appointment scheduled for 06/30/2025.

## 2025-06-07 ENCOUNTER — APPOINTMENT (OUTPATIENT)
Facility: HOSPITAL | Age: 75
End: 2025-06-07
Payer: MEDICARE

## 2025-06-07 LAB
ALBUMIN SERPL-MCNC: 3.5 G/DL (ref 3.5–5)
ALBUMIN/GLOB SERPL: 1 (ref 1.1–2.2)
ALP SERPL-CCNC: 109 U/L (ref 45–117)
ALT SERPL-CCNC: 38 U/L (ref 12–78)
ANION GAP SERPL CALC-SCNC: 4 MMOL/L (ref 2–12)
AST SERPL-CCNC: 25 U/L (ref 15–37)
BASOPHILS # BLD: 0.03 K/UL (ref 0–0.1)
BASOPHILS NFR BLD: 0.4 % (ref 0–1)
BILIRUB SERPL-MCNC: 1.9 MG/DL (ref 0.2–1)
BUN SERPL-MCNC: 25 MG/DL (ref 6–20)
BUN/CREAT SERPL: 14 (ref 12–20)
CALCIUM SERPL-MCNC: 9.4 MG/DL (ref 8.5–10.1)
CHLORIDE SERPL-SCNC: 108 MMOL/L (ref 97–108)
CO2 SERPL-SCNC: 29 MMOL/L (ref 21–32)
COMMENT:: NORMAL
CREAT SERPL-MCNC: 1.8 MG/DL (ref 0.7–1.3)
DIFFERENTIAL METHOD BLD: NORMAL
EOSINOPHIL # BLD: 0.06 K/UL (ref 0–0.4)
EOSINOPHIL NFR BLD: 0.7 % (ref 0–7)
ERYTHROCYTE [DISTWIDTH] IN BLOOD BY AUTOMATED COUNT: 13.3 % (ref 11.5–14.5)
GLOBULIN SER CALC-MCNC: 3.5 G/DL (ref 2–4)
GLUCOSE SERPL-MCNC: 127 MG/DL (ref 65–100)
HCT VFR BLD AUTO: 46.3 % (ref 36.6–50.3)
HGB BLD-MCNC: 14.6 G/DL (ref 12.1–17)
IMM GRANULOCYTES # BLD AUTO: 0.02 K/UL (ref 0–0.04)
IMM GRANULOCYTES NFR BLD AUTO: 0.2 % (ref 0–0.5)
LYMPHOCYTES # BLD: 1.98 K/UL (ref 0.8–3.5)
LYMPHOCYTES NFR BLD: 23.6 % (ref 12–49)
MCH RBC QN AUTO: 29.1 PG (ref 26–34)
MCHC RBC AUTO-ENTMCNC: 31.5 G/DL (ref 30–36.5)
MCV RBC AUTO: 92.2 FL (ref 80–99)
MONOCYTES # BLD: 0.67 K/UL (ref 0–1)
MONOCYTES NFR BLD: 8 % (ref 5–13)
NEUTS SEG # BLD: 5.63 K/UL (ref 1.8–8)
NEUTS SEG NFR BLD: 67.1 % (ref 32–75)
NRBC # BLD: 0 K/UL (ref 0–0.01)
NRBC BLD-RTO: 0 PER 100 WBC
PLATELET # BLD AUTO: 208 K/UL (ref 150–400)
PMV BLD AUTO: 9.5 FL (ref 8.9–12.9)
POTASSIUM SERPL-SCNC: 4 MMOL/L (ref 3.5–5.1)
PROT SERPL-MCNC: 7 G/DL (ref 6.4–8.2)
RBC # BLD AUTO: 5.02 M/UL (ref 4.1–5.7)
SODIUM SERPL-SCNC: 141 MMOL/L (ref 136–145)
SPECIMEN HOLD: NORMAL
TROPONIN I SERPL HS-MCNC: 7 NG/L (ref 0–76)
WBC # BLD AUTO: 8.4 K/UL (ref 4.1–11.1)

## 2025-06-07 PROCEDURE — 85025 COMPLETE CBC W/AUTO DIFF WBC: CPT

## 2025-06-07 PROCEDURE — 71275 CT ANGIOGRAPHY CHEST: CPT

## 2025-06-07 PROCEDURE — 80053 COMPREHEN METABOLIC PANEL: CPT

## 2025-06-07 PROCEDURE — 99285 EMERGENCY DEPT VISIT HI MDM: CPT

## 2025-06-07 PROCEDURE — 84484 ASSAY OF TROPONIN QUANT: CPT

## 2025-06-07 PROCEDURE — 93005 ELECTROCARDIOGRAM TRACING: CPT | Performed by: EMERGENCY MEDICINE

## 2025-06-07 PROCEDURE — 6360000004 HC RX CONTRAST MEDICATION: Performed by: RADIOLOGY

## 2025-06-07 PROCEDURE — 71046 X-RAY EXAM CHEST 2 VIEWS: CPT

## 2025-06-07 RX ORDER — IOPAMIDOL 755 MG/ML
100 INJECTION, SOLUTION INTRAVASCULAR
Status: COMPLETED | OUTPATIENT
Start: 2025-06-07 | End: 2025-06-07

## 2025-06-07 RX ADMIN — IOPAMIDOL 80 ML: 755 INJECTION, SOLUTION INTRAVENOUS at 23:41

## 2025-06-07 ASSESSMENT — PAIN SCALES - GENERAL: PAINLEVEL_OUTOF10: 4

## 2025-06-07 ASSESSMENT — PAIN DESCRIPTION - PAIN TYPE: TYPE: ACUTE PAIN

## 2025-06-07 ASSESSMENT — PAIN DESCRIPTION - DIRECTION: RADIATING_TOWARDS: UPPER BACK

## 2025-06-07 ASSESSMENT — PAIN DESCRIPTION - ORIENTATION: ORIENTATION: UPPER;MID

## 2025-06-07 ASSESSMENT — PAIN - FUNCTIONAL ASSESSMENT
PAIN_FUNCTIONAL_ASSESSMENT: ACTIVITIES ARE NOT PREVENTED
PAIN_FUNCTIONAL_ASSESSMENT: 0-10

## 2025-06-07 ASSESSMENT — PAIN DESCRIPTION - FREQUENCY: FREQUENCY: CONTINUOUS

## 2025-06-07 ASSESSMENT — PAIN DESCRIPTION - LOCATION: LOCATION: CHEST

## 2025-06-07 ASSESSMENT — PAIN DESCRIPTION - DESCRIPTORS: DESCRIPTORS: TIGHTNESS

## 2025-06-07 ASSESSMENT — PAIN DESCRIPTION - ONSET: ONSET: ON-GOING

## 2025-06-08 ENCOUNTER — HOSPITAL ENCOUNTER (EMERGENCY)
Facility: HOSPITAL | Age: 75
Discharge: HOME | End: 2025-06-08
Attending: EMERGENCY MEDICINE
Payer: MEDICARE

## 2025-06-08 VITALS
HEIGHT: 72 IN | SYSTOLIC BLOOD PRESSURE: 141 MMHG | HEART RATE: 61 BPM | OXYGEN SATURATION: 95 % | WEIGHT: 179.01 LBS | TEMPERATURE: 99.1 F | RESPIRATION RATE: 18 BRPM | BODY MASS INDEX: 24.25 KG/M2 | DIASTOLIC BLOOD PRESSURE: 70 MMHG

## 2025-06-08 DIAGNOSIS — R07.9 CHEST PAIN, UNSPECIFIED TYPE: Primary | ICD-10-CM

## 2025-06-08 LAB — TROPONIN I SERPL HS-MCNC: 7 NG/L (ref 0–76)

## 2025-06-08 PROCEDURE — 2580000003 HC RX 258: Performed by: PHYSICIAN ASSISTANT

## 2025-06-08 PROCEDURE — 96361 HYDRATE IV INFUSION ADD-ON: CPT

## 2025-06-08 PROCEDURE — 84484 ASSAY OF TROPONIN QUANT: CPT

## 2025-06-08 PROCEDURE — 96360 HYDRATION IV INFUSION INIT: CPT

## 2025-06-08 RX ORDER — SODIUM CHLORIDE 9 MG/ML
INJECTION, SOLUTION INTRAVENOUS ONCE
Status: COMPLETED | OUTPATIENT
Start: 2025-06-08 | End: 2025-06-08

## 2025-06-08 RX ADMIN — SODIUM CHLORIDE: 0.9 INJECTION, SOLUTION INTRAVENOUS at 01:03

## 2025-06-08 ASSESSMENT — PAIN SCALES - GENERAL: PAINLEVEL_OUTOF10: 0

## 2025-06-08 NOTE — ED TRIAGE NOTES
Pt arrives from home with cc of chest pain that started about an hour ago. Pt denies cardiac history, denies SOB, denies previous illness. Pt endorses pain worsens when taking a deep breath. Pt describes pain as \"feeling like I got punched in the chest.\" Pt has done no strenuous activity today and states he was resting when this occurred.

## 2025-06-08 NOTE — ED PROVIDER NOTES
abnormalities.  CTA of chest shows no evidence of PE, aortic dissection, aortic aneurysm.  Patient's heart score 3 which indicates patient is able to go home at low risk.  Patient's creatinine trending up, 1 L fluid given in ED.  Discussed my clinical impression(s), any labs and/or radiology results with the patient. I answered any questions and addressed any concerns. Discussed the importance of following up with their primary care physician and/or specialist(s). Discussed signs or symptoms that would warrant return back to the ER for further evaluation. The patient is agreeable with discharge.    Amount and/or Complexity of Data Reviewed  Labs: ordered.  Radiology: ordered.  ECG/medicine tests: ordered.    Risk  Prescription drug management.            REASSESSMENT     ED Course as of 06/08/25 0229   Sat Jun 07, 2025 2238 EKG obtained at 2228. Per my read, noted with sinus rhythm at a rate of 78. Normal axis and intervals. Narrow complex QRS. Nonspecific ST changes noted. No ectopy or ischemia noted.   [RS]      ED Course User Index  [RS] Wesley Augustin MD           CONSULTS:  None      FINAL IMPRESSION      1. Chest pain, unspecified type          DISPOSITION/PLAN   DISPOSITION Decision To Discharge 06/08/2025 02:04:36 AM      PATIENT REFERRED TO:  Your Family Doctor    Schedule an appointment as soon as possible for a visit in 1 day        DISCHARGE MEDICATIONS:  New Prescriptions    No medications on file         (Please note that portions of this note were completed with a voice recognition program.  Efforts were made to edit the dictations but occasionally words are mis-transcribed.)    Renee Barber PA-C (electronically signed)  Emergency Attending Physician / Physician Assistant / Nurse Practitioner             Renee Barber PA-C  06/08/25 0229

## 2025-06-10 LAB
EKG ATRIAL RATE: 78 BPM
EKG DIAGNOSIS: NORMAL
EKG P AXIS: 78 DEGREES
EKG P-R INTERVAL: 172 MS
EKG Q-T INTERVAL: 354 MS
EKG QRS DURATION: 80 MS
EKG QTC CALCULATION (BAZETT): 403 MS
EKG R AXIS: -1 DEGREES
EKG T AXIS: 82 DEGREES
EKG VENTRICULAR RATE: 78 BPM

## 2025-06-10 PROCEDURE — 93010 ELECTROCARDIOGRAM REPORT: CPT | Performed by: INTERNAL MEDICINE

## 2025-06-30 ENCOUNTER — HOSPITAL ENCOUNTER (OUTPATIENT)
Facility: HOSPITAL | Age: 75
Setting detail: INFUSION SERIES
Discharge: HOME | End: 2025-06-30
Payer: MEDICARE

## 2025-06-30 VITALS
WEIGHT: 179.6 LBS | OXYGEN SATURATION: 97 % | BODY MASS INDEX: 24.36 KG/M2 | TEMPERATURE: 96.9 F | RESPIRATION RATE: 17 BRPM | HEART RATE: 60 BPM | DIASTOLIC BLOOD PRESSURE: 73 MMHG | SYSTOLIC BLOOD PRESSURE: 137 MMHG

## 2025-06-30 DIAGNOSIS — K50.919 CROHN'S DISEASE WITH COMPLICATION, UNSPECIFIED GASTROINTESTINAL TRACT LOCATION (HCC): Primary | ICD-10-CM

## 2025-06-30 PROCEDURE — 2580000003 HC RX 258: Performed by: FAMILY MEDICINE

## 2025-06-30 PROCEDURE — 6360000002 HC RX W HCPCS: Performed by: FAMILY MEDICINE

## 2025-06-30 PROCEDURE — 96365 THER/PROPH/DIAG IV INF INIT: CPT

## 2025-06-30 RX ORDER — DIPHENHYDRAMINE HYDROCHLORIDE 50 MG/ML
50 INJECTION, SOLUTION INTRAMUSCULAR; INTRAVENOUS EVERY 4 HOURS PRN
Status: DISCONTINUED | OUTPATIENT
Start: 2025-06-30 | End: 2025-07-01 | Stop reason: HOSPADM

## 2025-06-30 RX ORDER — SODIUM CHLORIDE 9 MG/ML
5-250 INJECTION, SOLUTION INTRAVENOUS PRN
OUTPATIENT
Start: 2025-08-25

## 2025-06-30 RX ORDER — SODIUM CHLORIDE 9 MG/ML
5-250 INJECTION, SOLUTION INTRAVENOUS PRN
Status: DISCONTINUED | OUTPATIENT
Start: 2025-06-30 | End: 2025-07-01 | Stop reason: HOSPADM

## 2025-06-30 RX ORDER — DIPHENHYDRAMINE HYDROCHLORIDE 50 MG/ML
50 INJECTION, SOLUTION INTRAMUSCULAR; INTRAVENOUS EVERY 4 HOURS PRN
Start: 2025-08-25

## 2025-06-30 RX ORDER — ACETAMINOPHEN 325 MG/1
650 TABLET ORAL EVERY 4 HOURS PRN
Start: 2025-08-25

## 2025-06-30 RX ORDER — ACETAMINOPHEN 325 MG/1
650 TABLET ORAL EVERY 4 HOURS PRN
Status: ACTIVE | OUTPATIENT
Start: 2025-06-30 | End: 2025-06-30

## 2025-06-30 RX ADMIN — SODIUM CHLORIDE 50 ML/HR: 0.9 INJECTION, SOLUTION INTRAVENOUS at 07:57

## 2025-06-30 RX ADMIN — VEDOLIZUMAB 300 MG: 300 INJECTION, POWDER, LYOPHILIZED, FOR SOLUTION INTRAVENOUS at 08:49

## 2025-06-30 ASSESSMENT — PAIN SCALES - GENERAL: PAINLEVEL_OUTOF10: 0

## 2025-06-30 NOTE — PROGRESS NOTES
Pt arrived to [facility] for Entyvio in stable condition.  PIV established to right ac with positive blood return.     Vitals:    06/30/25 0748   BP: 137/73   Pulse: 60   Resp: 17   Temp: 96.9 °F (36.1 °C)   SpO2: 97%     Medications Administered         0.9 % sodium chloride infusion Admin Date  06/30/2025 Action  New Bag Dose  50 mL/hr Rate  50 mL/hr Route  IntraVENous Documented By  Mariama Rogers, RN        vedolizumab (ENTYVIO) 300 mg in sodium chloride 0.9 % 250 mL infusion Admin Date  06/30/2025 Action  New Bag Dose  300 mg Rate  560 mL/hr Route  IntraVENous Documented By  Mariama Rogers, RN              PIV flushed and removed per protocol. Pt tolerated treatment well. D/Cd from [facility] in no distress.  Future Appointments   Date Time Provider Department Center   7/17/2025  9:40 AM Addison Goddard MD [facility]   8/22/2025  8:30 AM [facility] CHAIR 1 [facility]   10/20/2025 10:45 AM [facility] CHAIR 3 RC [facility]

## 2025-07-17 ENCOUNTER — OFFICE VISIT (OUTPATIENT)
Age: 75
End: 2025-07-17
Payer: MEDICARE

## 2025-07-17 VITALS
DIASTOLIC BLOOD PRESSURE: 68 MMHG | SYSTOLIC BLOOD PRESSURE: 126 MMHG | BODY MASS INDEX: 24.24 KG/M2 | WEIGHT: 179 LBS | OXYGEN SATURATION: 95 % | HEART RATE: 68 BPM | HEIGHT: 72 IN

## 2025-07-17 DIAGNOSIS — Z87.891 HISTORY OF TOBACCO USE: ICD-10-CM

## 2025-07-17 DIAGNOSIS — R07.9 CHEST PAIN, UNSPECIFIED TYPE: ICD-10-CM

## 2025-07-17 DIAGNOSIS — R06.02 SHORTNESS OF BREATH: ICD-10-CM

## 2025-07-17 DIAGNOSIS — I20.0 UNSTABLE ANGINA (HCC): Primary | ICD-10-CM

## 2025-07-17 DIAGNOSIS — E78.2 MIXED HYPERLIPIDEMIA: ICD-10-CM

## 2025-07-17 PROCEDURE — 1036F TOBACCO NON-USER: CPT | Performed by: INTERNAL MEDICINE

## 2025-07-17 PROCEDURE — 1123F ACP DISCUSS/DSCN MKR DOCD: CPT | Performed by: INTERNAL MEDICINE

## 2025-07-17 PROCEDURE — 3017F COLORECTAL CA SCREEN DOC REV: CPT | Performed by: INTERNAL MEDICINE

## 2025-07-17 PROCEDURE — 1126F AMNT PAIN NOTED NONE PRSNT: CPT | Performed by: INTERNAL MEDICINE

## 2025-07-17 PROCEDURE — G8427 DOCREV CUR MEDS BY ELIG CLIN: HCPCS | Performed by: INTERNAL MEDICINE

## 2025-07-17 PROCEDURE — G8420 CALC BMI NORM PARAMETERS: HCPCS | Performed by: INTERNAL MEDICINE

## 2025-07-17 PROCEDURE — 99214 OFFICE O/P EST MOD 30 MIN: CPT | Performed by: INTERNAL MEDICINE

## 2025-07-17 PROCEDURE — 1159F MED LIST DOCD IN RCRD: CPT | Performed by: INTERNAL MEDICINE

## 2025-07-17 RX ORDER — SIMVASTATIN 40 MG
40 TABLET ORAL NIGHTLY
COMMUNITY

## 2025-07-17 RX ORDER — LANOLIN ALCOHOL/MO/W.PET/CERES
1000 CREAM (GRAM) TOPICAL DAILY
COMMUNITY

## 2025-07-17 ASSESSMENT — PATIENT HEALTH QUESTIONNAIRE - PHQ9
SUM OF ALL RESPONSES TO PHQ QUESTIONS 1-9: 0
SUM OF ALL RESPONSES TO PHQ QUESTIONS 1-9: 0
1. LITTLE INTEREST OR PLEASURE IN DOING THINGS: NOT AT ALL
2. FEELING DOWN, DEPRESSED OR HOPELESS: NOT AT ALL
SUM OF ALL RESPONSES TO PHQ QUESTIONS 1-9: 0
SUM OF ALL RESPONSES TO PHQ QUESTIONS 1-9: 0

## 2025-07-17 NOTE — PROGRESS NOTES
[facility] [place]:   [phone]    History of Present Illness:  Mr. Timothy Jaime is a 75 yo M with a history of tobacco use, stage 2 CKD, followed by renal Dr. Landa, Crohn's disease, followed by GI, mixed hyperlipidemia.    On his last visit, due to unstable angina/shortness of breath the plan was to proceed with a treadmill stress echo.  It sounds like he felt better and never had this done.  He went to the emergency room in June with chest pain. This occurred while he was getting ready to go to bed.  Workup there was overall unrevealing and he was told to followup.  He has not had any recurrent chest pain, but he has had progressive exertional shortness of breath.  He is concerned given his significant tobacco use history.  He is compensated on exam with clear lungs and no lower extremity edema.    Soc hx. Quit tobacco >10 yrs a  Fam hx. No early CAD  Assessment and Plan:  1. Unstable angina.  Chest pain, shortness of breath with typical/atypical features and multiple risk factors.  Will proceed with a treadmill stress echo for further evaluation.  2. Tobacco use.  Quit greater than 10 years ago.  3. CKD stage 2.  Followed by Renal, Dr. Landa.  4. Crohn's disease.  Followed by GI.  He has been seen at [place] in the past.  5. Mixed hyperlipidemia.  Tolerating statin.        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 . Grown children   Family hx: No family hx of early CAD. Father unstable angina- no reported CAD. No hx of ppm/ICD. No SCD.       He  has a past medical history of COPD (chronic obstructive pulmonary disease) (HCC), Crohn's disease (HCC), Depression, Elevated lipids, and Hyperlipidemia.    All other systems negative except as above.     PE  Vitals:    07/17/25 0928   BP: 126/68   BP Site: Left Upper Arm   Patient Position: Sitting   BP Cuff Size: Medium Adult   Pulse: 68   SpO2: 95%   Weight: 81.2 kg (179 lb)   Height: 1.829 m (6')      Body mass index is 24.28

## 2025-08-11 ENCOUNTER — ANCILLARY PROCEDURE (OUTPATIENT)
Age: 75
End: 2025-08-11
Payer: MEDICARE

## 2025-08-11 VITALS
BODY MASS INDEX: 24.24 KG/M2 | WEIGHT: 179 LBS | SYSTOLIC BLOOD PRESSURE: 118 MMHG | HEART RATE: 73 BPM | HEIGHT: 72 IN | DIASTOLIC BLOOD PRESSURE: 64 MMHG

## 2025-08-11 DIAGNOSIS — R06.02 SHORTNESS OF BREATH: ICD-10-CM

## 2025-08-11 LAB
ECHO BSA: 2.03 M2
ECHO EST RA PRESSURE: 3 MMHG
STRESS ANGINA INDEX: 0
STRESS BASELINE DIAS BP: 64 MMHG
STRESS BASELINE HR: 74 BPM
STRESS BASELINE ST DEPRESSION: 0 MM
STRESS BASELINE SYS BP: 118 MMHG
STRESS ESTIMATED WORKLOAD: 7 METS
STRESS EXERCISE DUR MIN: 4 MIN
STRESS EXERCISE DUR SEC: 13 SEC
STRESS O2 SAT PEAK: 98 %
STRESS O2 SAT REST: 97 %
STRESS PEAK DIAS BP: 74 MMHG
STRESS PEAK SYS BP: 176 MMHG
STRESS PERCENT HR ACHIEVED: 86 %
STRESS POST PEAK HR: 125 BPM
STRESS RATE PRESSURE PRODUCT: NORMAL BPM*MMHG
STRESS SR DUKE TREADMILL SCORE: 4
STRESS ST DEPRESSION: 0 MM
STRESS TARGET HR: 145 BPM

## 2025-08-11 PROCEDURE — 93351 STRESS TTE COMPLETE: CPT | Performed by: INTERNAL MEDICINE

## 2025-08-25 ENCOUNTER — HOSPITAL ENCOUNTER (OUTPATIENT)
Facility: HOSPITAL | Age: 75
Setting detail: INFUSION SERIES
Discharge: HOME | End: 2025-08-25
Payer: MEDICARE

## 2025-08-25 VITALS
SYSTOLIC BLOOD PRESSURE: 137 MMHG | HEART RATE: 73 BPM | DIASTOLIC BLOOD PRESSURE: 75 MMHG | OXYGEN SATURATION: 98 % | RESPIRATION RATE: 18 BRPM | BODY MASS INDEX: 24.01 KG/M2 | WEIGHT: 177 LBS | TEMPERATURE: 97.8 F

## 2025-08-25 DIAGNOSIS — K50.919 CROHN'S DISEASE WITH COMPLICATION, UNSPECIFIED GASTROINTESTINAL TRACT LOCATION (HCC): Primary | ICD-10-CM

## 2025-08-25 PROCEDURE — 6360000002 HC RX W HCPCS: Performed by: FAMILY MEDICINE

## 2025-08-25 PROCEDURE — 2580000003 HC RX 258: Performed by: FAMILY MEDICINE

## 2025-08-25 PROCEDURE — 96413 CHEMO IV INFUSION 1 HR: CPT

## 2025-08-25 RX ORDER — SODIUM CHLORIDE 9 MG/ML
5-250 INJECTION, SOLUTION INTRAVENOUS PRN
Status: DISCONTINUED | OUTPATIENT
Start: 2025-08-25 | End: 2025-08-26 | Stop reason: HOSPADM

## 2025-08-25 RX ORDER — ACETAMINOPHEN 325 MG/1
650 TABLET ORAL EVERY 4 HOURS PRN
Status: ACTIVE | OUTPATIENT
Start: 2025-08-25 | End: 2025-08-25

## 2025-08-25 RX ORDER — DIPHENHYDRAMINE HYDROCHLORIDE 50 MG/ML
50 INJECTION, SOLUTION INTRAMUSCULAR; INTRAVENOUS EVERY 4 HOURS PRN
Start: 2025-10-20

## 2025-08-25 RX ORDER — ACETAMINOPHEN 325 MG/1
650 TABLET ORAL EVERY 4 HOURS PRN
Start: 2025-10-20

## 2025-08-25 RX ORDER — DIPHENHYDRAMINE HYDROCHLORIDE 50 MG/ML
50 INJECTION, SOLUTION INTRAMUSCULAR; INTRAVENOUS EVERY 4 HOURS PRN
Status: DISCONTINUED | OUTPATIENT
Start: 2025-08-25 | End: 2025-08-26 | Stop reason: HOSPADM

## 2025-08-25 RX ORDER — SODIUM CHLORIDE 9 MG/ML
5-250 INJECTION, SOLUTION INTRAVENOUS PRN
OUTPATIENT
Start: 2025-10-20

## 2025-08-25 RX ADMIN — VEDOLIZUMAB 300 MG: 300 INJECTION, POWDER, LYOPHILIZED, FOR SOLUTION INTRAVENOUS at 08:38

## 2025-08-25 RX ADMIN — SODIUM CHLORIDE 50 ML/HR: 0.9 INJECTION, SOLUTION INTRAVENOUS at 08:13

## 2025-08-25 ASSESSMENT — PAIN SCALES - GENERAL
PAINLEVEL_OUTOF10: 0
PAINLEVEL_OUTOF10: 0

## (undated) DEVICE — TUBING HYDR IRR --

## (undated) DEVICE — FORCEPS BX L240CM JAW DIA2.8MM L CAP W/ NDL MIC MESH TOOTH